# Patient Record
Sex: MALE | Race: BLACK OR AFRICAN AMERICAN | NOT HISPANIC OR LATINO | Employment: FULL TIME | URBAN - METROPOLITAN AREA
[De-identification: names, ages, dates, MRNs, and addresses within clinical notes are randomized per-mention and may not be internally consistent; named-entity substitution may affect disease eponyms.]

---

## 2024-02-22 ENCOUNTER — APPOINTMENT (OUTPATIENT)
Dept: GENERAL RADIOLOGY | Facility: HOSPITAL | Age: 29
End: 2024-02-22
Payer: COMMERCIAL

## 2024-02-22 ENCOUNTER — HOSPITAL ENCOUNTER (INPATIENT)
Facility: HOSPITAL | Age: 29
LOS: 5 days | Discharge: HOME HEALTH | End: 2024-02-28
Attending: EMERGENCY MEDICINE | Admitting: INTERNAL MEDICINE
Payer: COMMERCIAL

## 2024-02-22 ENCOUNTER — APPOINTMENT (OUTPATIENT)
Dept: CT IMAGING | Facility: HOSPITAL | Age: 29
End: 2024-02-22
Payer: COMMERCIAL

## 2024-02-22 DIAGNOSIS — D69.6 THROMBOCYTOPENIA: ICD-10-CM

## 2024-02-22 DIAGNOSIS — D53.9 MACROCYTIC ANEMIA: ICD-10-CM

## 2024-02-22 DIAGNOSIS — I60.9 SUBARACHNOID BLEED: ICD-10-CM

## 2024-02-22 DIAGNOSIS — E87.6 HYPOKALEMIA: ICD-10-CM

## 2024-02-22 DIAGNOSIS — R74.8 ELEVATED LIVER ENZYMES: ICD-10-CM

## 2024-02-22 DIAGNOSIS — S06.5XAA SUBDURAL HEMATOMA: Primary | ICD-10-CM

## 2024-02-22 DIAGNOSIS — E83.42 HYPOMAGNESEMIA: ICD-10-CM

## 2024-02-22 DIAGNOSIS — R56.9 SEIZURE-LIKE ACTIVITY: ICD-10-CM

## 2024-02-22 DIAGNOSIS — S01.81XA LACERATION OF FOREHEAD, INITIAL ENCOUNTER: ICD-10-CM

## 2024-02-22 DIAGNOSIS — R73.9 HYPERGLYCEMIA: ICD-10-CM

## 2024-02-22 LAB
ALBUMIN SERPL-MCNC: 4.7 G/DL (ref 3.5–5.2)
ALBUMIN/GLOB SERPL: 1.6 G/DL
ALP SERPL-CCNC: 244 U/L (ref 39–117)
ALT SERPL W P-5'-P-CCNC: 78 U/L (ref 1–41)
ANION GAP SERPL CALCULATED.3IONS-SCNC: 19.2 MMOL/L (ref 5–15)
ANION GAP SERPL CALCULATED.3IONS-SCNC: 32.1 MMOL/L (ref 5–15)
AST SERPL-CCNC: 224 U/L (ref 1–40)
BASOPHILS # BLD AUTO: 0.02 10*3/MM3 (ref 0–0.2)
BASOPHILS NFR BLD AUTO: 0.4 % (ref 0–1.5)
BILIRUB SERPL-MCNC: 0.7 MG/DL (ref 0–1.2)
BUN SERPL-MCNC: 6 MG/DL (ref 6–20)
BUN SERPL-MCNC: 6 MG/DL (ref 6–20)
BUN/CREAT SERPL: 6.8 (ref 7–25)
BUN/CREAT SERPL: 8.6 (ref 7–25)
CALCIUM SPEC-SCNC: 7.9 MG/DL (ref 8.6–10.5)
CALCIUM SPEC-SCNC: 8.5 MG/DL (ref 8.6–10.5)
CHLORIDE SERPL-SCNC: 90 MMOL/L (ref 98–107)
CHLORIDE SERPL-SCNC: 96 MMOL/L (ref 98–107)
CO2 SERPL-SCNC: 14.9 MMOL/L (ref 22–29)
CO2 SERPL-SCNC: 19.8 MMOL/L (ref 22–29)
CREAT SERPL-MCNC: 0.7 MG/DL (ref 0.76–1.27)
CREAT SERPL-MCNC: 0.88 MG/DL (ref 0.76–1.27)
DEPRECATED RDW RBC AUTO: 53.9 FL (ref 37–54)
EGFRCR SERPLBLD CKD-EPI 2021: 117.9 ML/MIN/1.73
EGFRCR SERPLBLD CKD-EPI 2021: 126.3 ML/MIN/1.73
EOSINOPHIL # BLD AUTO: 0.05 10*3/MM3 (ref 0–0.4)
EOSINOPHIL NFR BLD AUTO: 1 % (ref 0.3–6.2)
ERYTHROCYTE [DISTWIDTH] IN BLOOD BY AUTOMATED COUNT: 15.2 % (ref 12.3–15.4)
ETHANOL BLD-MCNC: <10 MG/DL (ref 0–10)
ETHANOL UR QL: <0.01 %
GLOBULIN UR ELPH-MCNC: 2.9 GM/DL
GLUCOSE SERPL-MCNC: 125 MG/DL (ref 65–99)
GLUCOSE SERPL-MCNC: 65 MG/DL (ref 65–99)
HCT VFR BLD AUTO: 36.6 % (ref 37.5–51)
HGB BLD-MCNC: 12.7 G/DL (ref 13–17.7)
LYMPHOCYTES # BLD AUTO: 1.16 10*3/MM3 (ref 0.7–3.1)
LYMPHOCYTES NFR BLD AUTO: 23.3 % (ref 19.6–45.3)
MAGNESIUM SERPL-MCNC: 1.2 MG/DL (ref 1.6–2.6)
MCH RBC QN AUTO: 33.9 PG (ref 26.6–33)
MCHC RBC AUTO-ENTMCNC: 34.7 G/DL (ref 31.5–35.7)
MCV RBC AUTO: 97.6 FL (ref 79–97)
MONOCYTES # BLD AUTO: 0.57 10*3/MM3 (ref 0.1–0.9)
MONOCYTES NFR BLD AUTO: 11.4 % (ref 5–12)
NEUTROPHILS NFR BLD AUTO: 3.15 10*3/MM3 (ref 1.7–7)
NEUTROPHILS NFR BLD AUTO: 63.3 % (ref 42.7–76)
PLATELET # BLD AUTO: 96 10*3/MM3 (ref 140–450)
PMV BLD AUTO: 10.6 FL (ref 6–12)
POTASSIUM SERPL-SCNC: 3.2 MMOL/L (ref 3.5–5.2)
POTASSIUM SERPL-SCNC: 3.2 MMOL/L (ref 3.5–5.2)
PROT SERPL-MCNC: 7.6 G/DL (ref 6–8.5)
RBC # BLD AUTO: 3.75 10*6/MM3 (ref 4.14–5.8)
SODIUM SERPL-SCNC: 135 MMOL/L (ref 136–145)
SODIUM SERPL-SCNC: 137 MMOL/L (ref 136–145)
WBC NRBC COR # BLD AUTO: 4.98 10*3/MM3 (ref 3.4–10.8)

## 2024-02-22 PROCEDURE — 81001 URINALYSIS AUTO W/SCOPE: CPT | Performed by: EMERGENCY MEDICINE

## 2024-02-22 PROCEDURE — 80053 COMPREHEN METABOLIC PANEL: CPT | Performed by: EMERGENCY MEDICINE

## 2024-02-22 PROCEDURE — 93010 ELECTROCARDIOGRAM REPORT: CPT | Performed by: INTERNAL MEDICINE

## 2024-02-22 PROCEDURE — 84100 ASSAY OF PHOSPHORUS: CPT

## 2024-02-22 PROCEDURE — 99285 EMERGENCY DEPT VISIT HI MDM: CPT

## 2024-02-22 PROCEDURE — 80307 DRUG TEST PRSMV CHEM ANLYZR: CPT | Performed by: EMERGENCY MEDICINE

## 2024-02-22 PROCEDURE — 83735 ASSAY OF MAGNESIUM: CPT | Performed by: EMERGENCY MEDICINE

## 2024-02-22 PROCEDURE — 25810000003 SODIUM CHLORIDE 0.9 % SOLUTION: Performed by: EMERGENCY MEDICINE

## 2024-02-22 PROCEDURE — 25010000002 MAGNESIUM SULFATE 2 GM/50ML SOLUTION: Performed by: EMERGENCY MEDICINE

## 2024-02-22 PROCEDURE — 85025 COMPLETE CBC W/AUTO DIFF WBC: CPT | Performed by: EMERGENCY MEDICINE

## 2024-02-22 PROCEDURE — 71045 X-RAY EXAM CHEST 1 VIEW: CPT

## 2024-02-22 PROCEDURE — 83735 ASSAY OF MAGNESIUM: CPT

## 2024-02-22 PROCEDURE — 82550 ASSAY OF CK (CPK): CPT | Performed by: INTERNAL MEDICINE

## 2024-02-22 PROCEDURE — 70450 CT HEAD/BRAIN W/O DYE: CPT

## 2024-02-22 PROCEDURE — 82077 ASSAY SPEC XCP UR&BREATH IA: CPT | Performed by: EMERGENCY MEDICINE

## 2024-02-22 PROCEDURE — 93005 ELECTROCARDIOGRAM TRACING: CPT | Performed by: EMERGENCY MEDICINE

## 2024-02-22 PROCEDURE — 0KQ10ZZ REPAIR FACIAL MUSCLE, OPEN APPROACH: ICD-10-PCS | Performed by: NURSE PRACTITIONER

## 2024-02-22 RX ORDER — POTASSIUM CHLORIDE 750 MG/1
40 TABLET, FILM COATED, EXTENDED RELEASE ORAL ONCE
Status: COMPLETED | OUTPATIENT
Start: 2024-02-22 | End: 2024-02-22

## 2024-02-22 RX ORDER — LIDOCAINE HYDROCHLORIDE AND EPINEPHRINE 10; 10 MG/ML; UG/ML
10 INJECTION, SOLUTION INFILTRATION; PERINEURAL ONCE
Status: COMPLETED | OUTPATIENT
Start: 2024-02-23 | End: 2024-02-23

## 2024-02-22 RX ORDER — MAGNESIUM SULFATE HEPTAHYDRATE 40 MG/ML
2 INJECTION, SOLUTION INTRAVENOUS ONCE
Status: COMPLETED | OUTPATIENT
Start: 2024-02-22 | End: 2024-02-22

## 2024-02-22 RX ADMIN — MAGNESIUM SULFATE HEPTAHYDRATE 2 G: 40 INJECTION, SOLUTION INTRAVENOUS at 21:20

## 2024-02-22 RX ADMIN — MAGNESIUM OXIDE 400 MG (241.3 MG MAGNESIUM) TABLET 800 MG: TABLET at 21:18

## 2024-02-22 RX ADMIN — SODIUM CHLORIDE 1000 ML: 9 INJECTION, SOLUTION INTRAVENOUS at 20:17

## 2024-02-22 RX ADMIN — POTASSIUM CHLORIDE 40 MEQ: 750 TABLET, EXTENDED RELEASE ORAL at 21:18

## 2024-02-22 NOTE — Clinical Note
[facility] EMERGENCY DEPARTMENT  [address] [facility] [address]  Phone: [phone]    Nida Peck was seen and treated in our emergency department on 2/22/2024.  He may return to work on 02/26/2024.         Thank you for choosing [facility].    Bernardo Zacarias MD

## 2024-02-22 NOTE — LETTER
February 28, 2024     Patient: Nida Peck   YOB: 1995   Date of Visit: 2/22/2024       To Whom It May Concern:    It is my medical opinion that Nida Peck may return to work on 3/04/2024  with the following restrictions     Seizure Precautions:  ·    Do not drive 90 days. (this is to include: car, bicycle, or motorcycle)  ·    Do not drive operate dangerous equipment such as power tools, heavy machinery with moving parts, or an  open flame.  ·    Do not swim alone (this would include a bathtub full of water is a small swimming pool).  ·    Avoid unsecured heights. (this is to include: scaffolding, ladders, trees, and roofs).       Sincerely,       AYO Alejandre.

## 2024-02-23 ENCOUNTER — APPOINTMENT (OUTPATIENT)
Dept: CT IMAGING | Facility: HOSPITAL | Age: 29
End: 2024-02-23
Payer: COMMERCIAL

## 2024-02-23 ENCOUNTER — APPOINTMENT (OUTPATIENT)
Dept: NEUROLOGY | Facility: HOSPITAL | Age: 29
End: 2024-02-23
Payer: COMMERCIAL

## 2024-02-23 ENCOUNTER — APPOINTMENT (OUTPATIENT)
Dept: CARDIOLOGY | Facility: HOSPITAL | Age: 29
End: 2024-02-23
Payer: COMMERCIAL

## 2024-02-23 ENCOUNTER — APPOINTMENT (OUTPATIENT)
Dept: MRI IMAGING | Facility: HOSPITAL | Age: 29
End: 2024-02-23
Payer: COMMERCIAL

## 2024-02-23 PROBLEM — S06.5XAA SUBDURAL HEMATOMA: Status: ACTIVE | Noted: 2024-02-23

## 2024-02-23 LAB
ALBUMIN SERPL-MCNC: 3.3 G/DL (ref 3.5–5.2)
ALBUMIN SERPL-MCNC: 4.1 G/DL (ref 3.5–5.2)
ALBUMIN/GLOB SERPL: 1.5 G/DL
ALP SERPL-CCNC: 244 U/L (ref 39–117)
ALT SERPL W P-5'-P-CCNC: 100 U/L (ref 1–41)
AMMONIA BLD-SCNC: 50 UMOL/L (ref 16–60)
AMPHET+METHAMPHET UR QL: NEGATIVE
ANION GAP SERPL CALCULATED.3IONS-SCNC: 12.5 MMOL/L (ref 5–15)
ANION GAP SERPL CALCULATED.3IONS-SCNC: 21.1 MMOL/L (ref 5–15)
APTT PPP: 28.9 SECONDS (ref 22.7–35.4)
AST SERPL-CCNC: 567 U/L (ref 1–40)
B-OH-BUTYR SERPL-SCNC: 2.67 MMOL/L (ref 0.02–0.27)
BACTERIA UR QL AUTO: ABNORMAL /HPF
BARBITURATES UR QL SCN: NEGATIVE
BENZODIAZ UR QL SCN: NEGATIVE
BH CV VAS HEPATIC PORTAL LIVER LENGTH: 18.4 CM
BH CV VAS HEPATIC PORTAL SPLEEN LENGTH: 7.5 CM
BH CV VAS HEPATIC PORTAL SPLEEN WIDTH: 2.5 CM
BH CV VAS HEPATIC PORTAL VEIN DIAMETER: 1.07 CM
BH CV VAS HEPATOPORTAL HEPATIC V LT DIRECTION: NORMAL
BH CV VAS HEPATOPORTAL HEPATIC V LT FLOW: NORMAL
BH CV VAS HEPATOPORTAL HEPATIC V LT SPONT: NORMAL
BH CV VAS HEPATOPORTAL HEPATIC V MID DIRECTION: NORMAL
BH CV VAS HEPATOPORTAL HEPATIC V MID FLOW: NORMAL
BH CV VAS HEPATOPORTAL HEPATIC V MID SPONT: NORMAL
BH CV VAS HEPATOPORTAL HEPATIC V RT DIRECTION: NORMAL
BH CV VAS HEPATOPORTAL HEPATIC V RT FLOW: NORMAL
BH CV VAS HEPATOPORTAL HEPATIC V RT SPONT: NORMAL
BH CV VAS HEPATOPORTAL IVC CONFLUENCE FLOW: NORMAL
BH CV VAS HEPATOPORTAL IVC CONFLUENCE SPONT: NORMAL
BH CV VAS HEPATOPORTAL IVC FLOW: NORMAL
BH CV VAS HEPATOPORTAL IVC SPONT: NORMAL
BH CV VAS HEPATOPORTAL PORTAL V EXTRAHEPATIC DIRECTION: NORMAL
BH CV VAS HEPATOPORTAL PORTAL V EXTRAHEPATIC FLOW: NORMAL
BH CV VAS HEPATOPORTAL PORTAL V EXTRAHEPATIC SPONT: NORMAL
BH CV VAS HEPATOPORTAL PORTAL V LT INTRA DIRECTION: NORMAL
BH CV VAS HEPATOPORTAL PORTAL V LT INTRA FLOW: NORMAL
BH CV VAS HEPATOPORTAL PORTAL V LT INTRA SPONT: NORMAL
BH CV VAS HEPATOPORTAL PORTAL V MAIN INTRA DIRECTION: NORMAL
BH CV VAS HEPATOPORTAL PORTAL V MAIN INTRA FLOW: NORMAL
BH CV VAS HEPATOPORTAL PORTAL V MAIN INTRA SPONT: NORMAL
BH CV VAS HEPATOPORTAL PORTAL V PSV: 27.2 CM/S
BH CV VAS HEPATOPORTAL PORTAL V RT INTRA DIRECTION: NORMAL
BH CV VAS HEPATOPORTAL PORTAL V RT INTRA FLOW: NORMAL
BH CV VAS HEPATOPORTAL PORTAL V RT INTRA SPONT: NORMAL
BH CV VAS HEPATOPORTAL SMV PSV: 20.5 CM/S
BH CV VAS HEPATOPORTAL SPLENIC DIRECTION: NORMAL
BH CV VAS HEPATOPORTAL SPLENIC FLOW: NORMAL
BH CV VAS HEPATOPORTAL SPLENIC SPONT: NORMAL
BH CV VAS HEPATOPORTAL SPLENIC V PSV: 35.4 CM/S
BH CV VAS SMA HEPATIC EDV: 31.2 CM/S
BH CV VAS SMA HEPATIC PSV: 72.1 CM/S
BH CV VAS SMA SPLENIC EDV: 37 CM/S
BH CV VAS SMA SPLENIC PSV: 80.9 CM/S
BILIRUB SERPL-MCNC: 1.2 MG/DL (ref 0–1.2)
BILIRUB UR QL STRIP: NEGATIVE
BUN SERPL-MCNC: 3 MG/DL (ref 6–20)
BUN SERPL-MCNC: 5 MG/DL (ref 6–20)
BUN/CREAT SERPL: 4.4 (ref 7–25)
BUN/CREAT SERPL: 6.8 (ref 7–25)
CALCIUM SPEC-SCNC: 7.4 MG/DL (ref 8.6–10.5)
CALCIUM SPEC-SCNC: 8.3 MG/DL (ref 8.6–10.5)
CANNABINOIDS SERPL QL: NEGATIVE
CHLORIDE SERPL-SCNC: 103 MMOL/L (ref 98–107)
CHLORIDE SERPL-SCNC: 96 MMOL/L (ref 98–107)
CHOLEST SERPL-MCNC: 161 MG/DL (ref 0–200)
CK SERPL-CCNC: 163 U/L (ref 20–200)
CK SERPL-CCNC: 236 U/L (ref 20–200)
CLARITY UR: ABNORMAL
CO2 SERPL-SCNC: 20.5 MMOL/L (ref 22–29)
CO2 SERPL-SCNC: 20.9 MMOL/L (ref 22–29)
COCAINE UR QL: NEGATIVE
COLOR UR: ABNORMAL
CREAT SERPL-MCNC: 0.68 MG/DL (ref 0.76–1.27)
CREAT SERPL-MCNC: 0.74 MG/DL (ref 0.76–1.27)
D-LACTATE SERPL-SCNC: 1.5 MMOL/L (ref 0.5–2)
DEPRECATED RDW RBC AUTO: 56.1 FL (ref 37–54)
EGFRCR SERPLBLD CKD-EPI 2021: 124.2 ML/MIN/1.73
EGFRCR SERPLBLD CKD-EPI 2021: 127.4 ML/MIN/1.73
ERYTHROCYTE [DISTWIDTH] IN BLOOD BY AUTOMATED COUNT: 15.8 % (ref 12.3–15.4)
FENTANYL UR-MCNC: NEGATIVE NG/ML
FOLATE SERPL-MCNC: 3.18 NG/ML (ref 4.78–24.2)
GGT SERPL-CCNC: 573 U/L (ref 8–61)
GLOBULIN UR ELPH-MCNC: 2.7 GM/DL
GLUCOSE BLDC GLUCOMTR-MCNC: 71 MG/DL (ref 70–130)
GLUCOSE BLDC GLUCOMTR-MCNC: 85 MG/DL (ref 70–130)
GLUCOSE BLDC GLUCOMTR-MCNC: 87 MG/DL (ref 70–130)
GLUCOSE BLDC GLUCOMTR-MCNC: 89 MG/DL (ref 70–130)
GLUCOSE SERPL-MCNC: 79 MG/DL (ref 65–99)
GLUCOSE SERPL-MCNC: 89 MG/DL (ref 65–99)
GLUCOSE UR STRIP-MCNC: NEGATIVE MG/DL
HAV IGM SERPL QL IA: NORMAL
HBA1C MFR BLD: 5.5 % (ref 4.8–5.6)
HBV CORE IGM SERPL QL IA: NORMAL
HBV SURFACE AG SERPL QL IA: NORMAL
HCT VFR BLD AUTO: 33.4 % (ref 37.5–51)
HCV AB SER DONR QL: NORMAL
HDLC SERPL-MCNC: 78 MG/DL (ref 40–60)
HGB BLD-MCNC: 11.5 G/DL (ref 13–17.7)
HGB UR QL STRIP.AUTO: NEGATIVE
HYALINE CASTS UR QL AUTO: ABNORMAL /LPF
INR PPP: 1.19 (ref 0.9–1.1)
KETONES UR QL STRIP: ABNORMAL
LDH SERPL-CCNC: 465 U/L (ref 135–225)
LDLC SERPL CALC-MCNC: 67 MG/DL (ref 0–100)
LDLC/HDLC SERPL: 0.84 {RATIO}
LEUKOCYTE ESTERASE UR QL STRIP.AUTO: ABNORMAL
MAGNESIUM SERPL-MCNC: 1.8 MG/DL (ref 1.6–2.6)
MCH RBC QN AUTO: 33.5 PG (ref 26.6–33)
MCHC RBC AUTO-ENTMCNC: 34.4 G/DL (ref 31.5–35.7)
MCV RBC AUTO: 97.4 FL (ref 79–97)
METHADONE UR QL SCN: NEGATIVE
NITRITE UR QL STRIP: NEGATIVE
OPIATES UR QL: NEGATIVE
OSMOLALITY SERPL: 277 MOSM/KG (ref 275–300)
OXYCODONE UR QL SCN: NEGATIVE
PH UR STRIP.AUTO: 6.5 [PH] (ref 5–8)
PHOSPHATE SERPL-MCNC: 1.5 MG/DL (ref 2.5–4.5)
PHOSPHATE SERPL-MCNC: 1.5 MG/DL (ref 2.5–4.5)
PHOSPHATE SERPL-MCNC: 2.1 MG/DL (ref 2.5–4.5)
PHOSPHATE SERPL-MCNC: 2.2 MG/DL (ref 2.5–4.5)
PLATELET # BLD AUTO: 81 10*3/MM3 (ref 140–450)
PMV BLD AUTO: 10.5 FL (ref 6–12)
POTASSIUM SERPL-SCNC: 3.2 MMOL/L (ref 3.5–5.2)
POTASSIUM SERPL-SCNC: 3.7 MMOL/L (ref 3.5–5.2)
PROCALCITONIN SERPL-MCNC: 0.6 NG/ML (ref 0–0.25)
PROT SERPL-MCNC: 6.8 G/DL (ref 6–8.5)
PROT UR QL STRIP: ABNORMAL
PROTHROMBIN TIME: 15.3 SECONDS (ref 11.7–14.2)
QT INTERVAL: 351 MS
QT INTERVAL: 379 MS
QTC INTERVAL: 451 MS
QTC INTERVAL: 597 MS
RBC # BLD AUTO: 3.43 10*6/MM3 (ref 4.14–5.8)
RBC # UR STRIP: ABNORMAL /HPF
REF LAB TEST METHOD: ABNORMAL
SODIUM SERPL-SCNC: 136 MMOL/L (ref 136–145)
SODIUM SERPL-SCNC: 138 MMOL/L (ref 136–145)
SP GR UR STRIP: 1.02 (ref 1–1.03)
SQUAMOUS #/AREA URNS HPF: ABNORMAL /HPF
TRICHOMONAS #/AREA URNS HPF: ABNORMAL /HPF
TRIGL SERPL-MCNC: 88 MG/DL (ref 0–150)
UROBILINOGEN UR QL STRIP: ABNORMAL
VIT B12 BLD-MCNC: 726 PG/ML (ref 211–946)
VLDLC SERPL-MCNC: 16 MG/DL (ref 5–40)
WBC # UR STRIP: ABNORMAL /HPF
WBC NRBC COR # BLD AUTO: 5.13 10*3/MM3 (ref 3.4–10.8)

## 2024-02-23 PROCEDURE — 99222 1ST HOSP IP/OBS MODERATE 55: CPT | Performed by: STUDENT IN AN ORGANIZED HEALTH CARE EDUCATION/TRAINING PROGRAM

## 2024-02-23 PROCEDURE — 93010 ELECTROCARDIOGRAM REPORT: CPT | Performed by: INTERNAL MEDICINE

## 2024-02-23 PROCEDURE — 82010 KETONE BODYS QUAN: CPT

## 2024-02-23 PROCEDURE — 80053 COMPREHEN METABOLIC PANEL: CPT

## 2024-02-23 PROCEDURE — 95816 EEG AWAKE AND DROWSY: CPT

## 2024-02-23 PROCEDURE — 25010000002 TETANUS-DIPHTH-ACELL PERTUSSIS 5-2.5-18.5 LF-MCG/0.5 SUSPENSION PREFILLED SYRINGE: Performed by: EMERGENCY MEDICINE

## 2024-02-23 PROCEDURE — 25810000003 SODIUM CHLORIDE 0.9 % SOLUTION: Performed by: INTERNAL MEDICINE

## 2024-02-23 PROCEDURE — 82140 ASSAY OF AMMONIA: CPT | Performed by: INTERNAL MEDICINE

## 2024-02-23 PROCEDURE — 85610 PROTHROMBIN TIME: CPT | Performed by: EMERGENCY MEDICINE

## 2024-02-23 PROCEDURE — 25010000002 LEVETRIRACETAM PER 10 MG: Performed by: EMERGENCY MEDICINE

## 2024-02-23 PROCEDURE — 82948 REAGENT STRIP/BLOOD GLUCOSE: CPT

## 2024-02-23 PROCEDURE — 25010000002 ONDANSETRON PER 1 MG: Performed by: EMERGENCY MEDICINE

## 2024-02-23 PROCEDURE — 85027 COMPLETE CBC AUTOMATED: CPT

## 2024-02-23 PROCEDURE — 25810000003 DEXTROSE-NACL PER 500 ML: Performed by: INTERNAL MEDICINE

## 2024-02-23 PROCEDURE — 95711 VEEG 2-12 HR UNMONITORED: CPT

## 2024-02-23 PROCEDURE — 83930 ASSAY OF BLOOD OSMOLALITY: CPT

## 2024-02-23 PROCEDURE — 84100 ASSAY OF PHOSPHORUS: CPT

## 2024-02-23 PROCEDURE — 84100 ASSAY OF PHOSPHORUS: CPT | Performed by: INTERNAL MEDICINE

## 2024-02-23 PROCEDURE — 80061 LIPID PANEL: CPT

## 2024-02-23 PROCEDURE — 97166 OT EVAL MOD COMPLEX 45 MIN: CPT

## 2024-02-23 PROCEDURE — 97530 THERAPEUTIC ACTIVITIES: CPT

## 2024-02-23 PROCEDURE — 85730 THROMBOPLASTIN TIME PARTIAL: CPT | Performed by: EMERGENCY MEDICINE

## 2024-02-23 PROCEDURE — 93005 ELECTROCARDIOGRAM TRACING: CPT

## 2024-02-23 PROCEDURE — 82746 ASSAY OF FOLIC ACID SERUM: CPT

## 2024-02-23 PROCEDURE — 99253 IP/OBS CNSLTJ NEW/EST LOW 45: CPT | Performed by: NURSE PRACTITIONER

## 2024-02-23 PROCEDURE — 82607 VITAMIN B-12: CPT

## 2024-02-23 PROCEDURE — 25810000003 SODIUM CHLORIDE 0.9 % SOLUTION

## 2024-02-23 PROCEDURE — 92610 EVALUATE SWALLOWING FUNCTION: CPT

## 2024-02-23 PROCEDURE — 99222 1ST HOSP IP/OBS MODERATE 55: CPT | Performed by: INTERNAL MEDICINE

## 2024-02-23 PROCEDURE — 70450 CT HEAD/BRAIN W/O DYE: CPT

## 2024-02-23 PROCEDURE — 25010000002 THIAMINE HCL 200 MG/2ML SOLUTION: Performed by: INTERNAL MEDICINE

## 2024-02-23 PROCEDURE — 72125 CT NECK SPINE W/O DYE: CPT

## 2024-02-23 PROCEDURE — 83615 LACTATE (LD) (LDH) ENZYME: CPT | Performed by: INTERNAL MEDICINE

## 2024-02-23 PROCEDURE — 93975 VASCULAR STUDY: CPT

## 2024-02-23 PROCEDURE — 82977 ASSAY OF GGT: CPT | Performed by: INTERNAL MEDICINE

## 2024-02-23 PROCEDURE — 83036 HEMOGLOBIN GLYCOSYLATED A1C: CPT

## 2024-02-23 PROCEDURE — 25010000002 LEVETRIRACETAM PER 10 MG: Performed by: STUDENT IN AN ORGANIZED HEALTH CARE EDUCATION/TRAINING PROGRAM

## 2024-02-23 PROCEDURE — 90471 IMMUNIZATION ADMIN: CPT | Performed by: EMERGENCY MEDICINE

## 2024-02-23 PROCEDURE — 84145 PROCALCITONIN (PCT): CPT | Performed by: INTERNAL MEDICINE

## 2024-02-23 PROCEDURE — 74176 CT ABD & PELVIS W/O CONTRAST: CPT

## 2024-02-23 PROCEDURE — 82085 ASSAY OF ALDOLASE: CPT | Performed by: INTERNAL MEDICINE

## 2024-02-23 PROCEDURE — 95711 VEEG 2-12 HR UNMONITORED: CPT | Performed by: STUDENT IN AN ORGANIZED HEALTH CARE EDUCATION/TRAINING PROGRAM

## 2024-02-23 PROCEDURE — 25010000002 LORAZEPAM PER 2 MG: Performed by: INTERNAL MEDICINE

## 2024-02-23 PROCEDURE — 90715 TDAP VACCINE 7 YRS/> IM: CPT | Performed by: EMERGENCY MEDICINE

## 2024-02-23 PROCEDURE — 80074 ACUTE HEPATITIS PANEL: CPT | Performed by: INTERNAL MEDICINE

## 2024-02-23 PROCEDURE — 97163 PT EVAL HIGH COMPLEX 45 MIN: CPT

## 2024-02-23 PROCEDURE — 83605 ASSAY OF LACTIC ACID: CPT | Performed by: INTERNAL MEDICINE

## 2024-02-23 PROCEDURE — 82550 ASSAY OF CK (CPK): CPT | Performed by: INTERNAL MEDICINE

## 2024-02-23 RX ORDER — CLONIDINE HYDROCHLORIDE 0.1 MG/1
0.1 TABLET ORAL EVERY 4 HOURS PRN
Status: DISCONTINUED | OUTPATIENT
Start: 2024-02-23 | End: 2024-02-28 | Stop reason: HOSPADM

## 2024-02-23 RX ORDER — LORAZEPAM 2 MG/ML
4 INJECTION INTRAMUSCULAR
Status: ACTIVE | OUTPATIENT
Start: 2024-02-23 | End: 2024-02-28

## 2024-02-23 RX ORDER — THIAMINE HYDROCHLORIDE 100 MG/ML
200 INJECTION, SOLUTION INTRAMUSCULAR; INTRAVENOUS EVERY 8 HOURS SCHEDULED
Status: DISPENSED | OUTPATIENT
Start: 2024-02-23 | End: 2024-02-28

## 2024-02-23 RX ORDER — LEVETIRACETAM 500 MG/5ML
500 INJECTION, SOLUTION, CONCENTRATE INTRAVENOUS EVERY 12 HOURS SCHEDULED
Status: DISCONTINUED | OUTPATIENT
Start: 2024-02-23 | End: 2024-02-27

## 2024-02-23 RX ORDER — DEXTROSE AND SODIUM CHLORIDE 5; .9 G/100ML; G/100ML
100 INJECTION, SOLUTION INTRAVENOUS CONTINUOUS
Status: DISCONTINUED | OUTPATIENT
Start: 2024-02-23 | End: 2024-02-27

## 2024-02-23 RX ORDER — ACETAMINOPHEN 325 MG/1
650 TABLET ORAL EVERY 4 HOURS PRN
Status: DISCONTINUED | OUTPATIENT
Start: 2024-02-23 | End: 2024-02-28 | Stop reason: HOSPADM

## 2024-02-23 RX ORDER — SODIUM CHLORIDE 0.9 % (FLUSH) 0.9 %
10 SYRINGE (ML) INJECTION AS NEEDED
Status: DISCONTINUED | OUTPATIENT
Start: 2024-02-23 | End: 2024-02-28 | Stop reason: HOSPADM

## 2024-02-23 RX ORDER — PANTOPRAZOLE SODIUM 40 MG/10ML
40 INJECTION, POWDER, LYOPHILIZED, FOR SOLUTION INTRAVENOUS
Status: DISCONTINUED | OUTPATIENT
Start: 2024-02-23 | End: 2024-02-27

## 2024-02-23 RX ORDER — FENTANYL/ROPIVACAINE/NS/PF 2-625MCG/1
15 PLASTIC BAG, INJECTION (ML) EPIDURAL ONCE
Status: COMPLETED | OUTPATIENT
Start: 2024-02-23 | End: 2024-02-24

## 2024-02-23 RX ORDER — LORAZEPAM 2 MG/ML
2 INJECTION INTRAMUSCULAR EVERY 6 HOURS
Status: COMPLETED | OUTPATIENT
Start: 2024-02-23 | End: 2024-02-23

## 2024-02-23 RX ORDER — LORAZEPAM 2 MG/ML
1 INJECTION INTRAMUSCULAR EVERY 6 HOURS
Status: DISPENSED | OUTPATIENT
Start: 2024-02-24 | End: 2024-02-26

## 2024-02-23 RX ORDER — SODIUM CHLORIDE 9 MG/ML
150 INJECTION, SOLUTION INTRAVENOUS CONTINUOUS
Status: DISCONTINUED | OUTPATIENT
Start: 2024-02-23 | End: 2024-02-23

## 2024-02-23 RX ORDER — DOCUSATE SODIUM 100 MG/1
100 CAPSULE, LIQUID FILLED ORAL DAILY
Status: DISCONTINUED | OUTPATIENT
Start: 2024-02-23 | End: 2024-02-28 | Stop reason: HOSPADM

## 2024-02-23 RX ORDER — SODIUM CHLORIDE 9 MG/ML
40 INJECTION, SOLUTION INTRAVENOUS AS NEEDED
Status: DISCONTINUED | OUTPATIENT
Start: 2024-02-23 | End: 2024-02-28 | Stop reason: HOSPADM

## 2024-02-23 RX ORDER — LORAZEPAM 2 MG/ML
2 INJECTION INTRAMUSCULAR
Status: DISPENSED | OUTPATIENT
Start: 2024-02-23 | End: 2024-02-28

## 2024-02-23 RX ORDER — POTASSIUM CHLORIDE 1.5 G/1.58G
40 POWDER, FOR SOLUTION ORAL EVERY 4 HOURS
Status: COMPLETED | OUTPATIENT
Start: 2024-02-23 | End: 2024-02-23

## 2024-02-23 RX ORDER — SODIUM CHLORIDE 0.9 % (FLUSH) 0.9 %
10 SYRINGE (ML) INJECTION EVERY 12 HOURS SCHEDULED
Status: DISCONTINUED | OUTPATIENT
Start: 2024-02-23 | End: 2024-02-28 | Stop reason: HOSPADM

## 2024-02-23 RX ORDER — BISACODYL 10 MG
10 SUPPOSITORY, RECTAL RECTAL DAILY PRN
Status: DISCONTINUED | OUTPATIENT
Start: 2024-02-23 | End: 2024-02-28 | Stop reason: HOSPADM

## 2024-02-23 RX ORDER — LORAZEPAM 1 MG/1
1 TABLET ORAL
Status: ACTIVE | OUTPATIENT
Start: 2024-02-23 | End: 2024-02-28

## 2024-02-23 RX ORDER — LORAZEPAM 2 MG/ML
1 INJECTION INTRAMUSCULAR
Status: ACTIVE | OUTPATIENT
Start: 2024-02-23 | End: 2024-02-28

## 2024-02-23 RX ORDER — LORAZEPAM 2 MG/ML
2 INJECTION INTRAMUSCULAR
Status: ACTIVE | OUTPATIENT
Start: 2024-02-23 | End: 2024-02-28

## 2024-02-23 RX ORDER — LORAZEPAM 1 MG/1
4 TABLET ORAL
Status: ACTIVE | OUTPATIENT
Start: 2024-02-23 | End: 2024-02-28

## 2024-02-23 RX ORDER — ALUMINA, MAGNESIA, AND SIMETHICONE 2400; 2400; 240 MG/30ML; MG/30ML; MG/30ML
7.5 SUSPENSION ORAL EVERY 4 HOURS PRN
Status: DISCONTINUED | OUTPATIENT
Start: 2024-02-23 | End: 2024-02-28 | Stop reason: HOSPADM

## 2024-02-23 RX ORDER — ONDANSETRON 2 MG/ML
4 INJECTION INTRAMUSCULAR; INTRAVENOUS EVERY 6 HOURS PRN
Status: DISCONTINUED | OUTPATIENT
Start: 2024-02-23 | End: 2024-02-28 | Stop reason: HOSPADM

## 2024-02-23 RX ORDER — LORAZEPAM 1 MG/1
2 TABLET ORAL
Status: ACTIVE | OUTPATIENT
Start: 2024-02-23 | End: 2024-02-28

## 2024-02-23 RX ORDER — FENTANYL/ROPIVACAINE/NS/PF 2-625MCG/1
15 PLASTIC BAG, INJECTION (ML) EPIDURAL
Status: COMPLETED | OUTPATIENT
Start: 2024-02-23 | End: 2024-02-23

## 2024-02-23 RX ORDER — ACETAMINOPHEN 650 MG/1
650 SUPPOSITORY RECTAL EVERY 4 HOURS PRN
Status: DISCONTINUED | OUTPATIENT
Start: 2024-02-23 | End: 2024-02-28 | Stop reason: HOSPADM

## 2024-02-23 RX ORDER — ONDANSETRON 2 MG/ML
4 INJECTION INTRAMUSCULAR; INTRAVENOUS ONCE
Status: COMPLETED | OUTPATIENT
Start: 2024-02-23 | End: 2024-02-23

## 2024-02-23 RX ORDER — LEVETIRACETAM 500 MG/5ML
1000 INJECTION, SOLUTION, CONCENTRATE INTRAVENOUS ONCE
Status: COMPLETED | OUTPATIENT
Start: 2024-02-23 | End: 2024-02-23

## 2024-02-23 RX ADMIN — LEVETIRACETAM 1000 MG: 100 INJECTION INTRAVENOUS at 01:10

## 2024-02-23 RX ADMIN — FOLIC ACID 1 MG: 5 INJECTION, SOLUTION INTRAMUSCULAR; INTRAVENOUS; SUBCUTANEOUS at 09:12

## 2024-02-23 RX ADMIN — POTASSIUM CHLORIDE 40 MEQ: 1.5 FOR SOLUTION ORAL at 07:48

## 2024-02-23 RX ADMIN — LIDOCAINE HYDROCHLORIDE,EPINEPHRINE BITARTRATE 10 ML: 10; .01 INJECTION, SOLUTION INFILTRATION; PERINEURAL at 01:24

## 2024-02-23 RX ADMIN — Medication 10 ML: at 07:48

## 2024-02-23 RX ADMIN — Medication 10 ML: at 03:11

## 2024-02-23 RX ADMIN — DOCUSATE SODIUM 100 MG: 100 CAPSULE, LIQUID FILLED ORAL at 07:48

## 2024-02-23 RX ADMIN — SODIUM CHLORIDE 100 ML/HR: 9 INJECTION, SOLUTION INTRAVENOUS at 03:33

## 2024-02-23 RX ADMIN — LEVETIRACETAM 500 MG: 500 INJECTION, SOLUTION INTRAVENOUS at 20:45

## 2024-02-23 RX ADMIN — SODIUM CHLORIDE 150 ML/HR: 9 INJECTION, SOLUTION INTRAVENOUS at 14:07

## 2024-02-23 RX ADMIN — POTASSIUM PHOSPHATE, MONOBASIC POTASSIUM PHOSPHATE, DIBASIC 15 MMOL: 224; 236 INJECTION, SOLUTION, CONCENTRATE INTRAVENOUS at 23:29

## 2024-02-23 RX ADMIN — PANTOPRAZOLE SODIUM 40 MG: 40 INJECTION, POWDER, FOR SOLUTION INTRAVENOUS at 05:46

## 2024-02-23 RX ADMIN — THIAMINE HYDROCHLORIDE 200 MG: 100 INJECTION, SOLUTION INTRAMUSCULAR; INTRAVENOUS at 14:03

## 2024-02-23 RX ADMIN — ONDANSETRON 4 MG: 2 INJECTION INTRAMUSCULAR; INTRAVENOUS at 00:17

## 2024-02-23 RX ADMIN — DEXTROSE AND SODIUM CHLORIDE 100 ML/HR: 5; 900 INJECTION, SOLUTION INTRAVENOUS at 17:21

## 2024-02-23 RX ADMIN — LEVETIRACETAM 500 MG: 500 INJECTION, SOLUTION INTRAVENOUS at 10:56

## 2024-02-23 RX ADMIN — THIAMINE HYDROCHLORIDE 200 MG: 100 INJECTION, SOLUTION INTRAMUSCULAR; INTRAVENOUS at 05:12

## 2024-02-23 RX ADMIN — LORAZEPAM 2 MG: 2 INJECTION INTRAMUSCULAR; INTRAVENOUS at 03:11

## 2024-02-23 RX ADMIN — LORAZEPAM 2 MG: 2 INJECTION INTRAMUSCULAR; INTRAVENOUS at 14:03

## 2024-02-23 RX ADMIN — LORAZEPAM 2 MG: 2 INJECTION INTRAMUSCULAR; INTRAVENOUS at 07:48

## 2024-02-23 RX ADMIN — POTASSIUM CHLORIDE 40 MEQ: 1.5 FOR SOLUTION ORAL at 04:26

## 2024-02-23 RX ADMIN — TETANUS TOXOID, REDUCED DIPHTHERIA TOXOID AND ACELLULAR PERTUSSIS VACCINE, ADSORBED 0.5 ML: 5; 2.5; 8; 8; 2.5 SUSPENSION INTRAMUSCULAR at 00:27

## 2024-02-23 RX ADMIN — Medication 10 ML: at 20:45

## 2024-02-23 RX ADMIN — POTASSIUM PHOSPHATE, MONOBASIC POTASSIUM PHOSPHATE, DIBASIC 15 MMOL: 224; 236 INJECTION, SOLUTION, CONCENTRATE INTRAVENOUS at 09:13

## 2024-02-23 RX ADMIN — METOPROLOL TARTRATE 12.5 MG: 25 TABLET, FILM COATED ORAL at 20:46

## 2024-02-23 RX ADMIN — SODIUM CHLORIDE 1000 ML: 9 INJECTION, SOLUTION INTRAVENOUS at 04:31

## 2024-02-23 RX ADMIN — LORAZEPAM 2 MG: 2 INJECTION INTRAMUSCULAR; INTRAVENOUS at 20:45

## 2024-02-23 RX ADMIN — POTASSIUM PHOSPHATE, MONOBASIC POTASSIUM PHOSPHATE, DIBASIC 15 MMOL: 224; 236 INJECTION, SOLUTION, CONCENTRATE INTRAVENOUS at 05:10

## 2024-02-23 NOTE — ED TRIAGE NOTES
Pt arrived via [facility] from work. 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 Per witness, pt was unconscious for about 3-5 minutes. Pt denies any similar history. Pt A&O x4 during triage exam but states he feels lightheaded.

## 2024-02-23 NOTE — H&P
Group: [facility]         History and Physical    Patient Identification:  Nida Peck  29 y.o.  male  1995  [id number]            Requesting physician: Dr Mckeon    Reason for Consultation: ICU admission    CC: Seizure, SDH, SAH, Alcohol withdrawal    History of Present Illness:  Nida Peck is a 29 year old male with past medical history of alcohol dependence with withdrawal, alcoholic ketosis, malnutrition, iron deficiency anemia, alcoholic hepatitis, thrombocytopenia, tobacco abuse, Sandoval-Alvaro syndrome with Bactrim, seizures, anxiety and depression, GERD, esophagitis who presented to the ED for possible seizure at work.  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  Reports feeling of his heart racing, and has been having nausea vomiting diarrhea x 2 days with no abdominal pain.  He received IV fluids, CT head was negative, and was being discharged home with a referral to neurology with seizure precautions when he collapsed in the ED hallway and was found with seizure activity.  He hit is head and a laceration was found above right eye.  CT head revealed left subdural hematoma, midline shift of 6mm as well as trace left subarachnoid, parafalcine and tentorial hemorrhage.  He was given 1 g IV Keppra, 2 g mag sulfate, potassium chloride, tetanus injection and his eyebrow was sutured in the ED.  Neurosurgery consulted and he was admitted to ICU for further management.      Per chart review, in 2021 patient was drinking half pint of liquor every 2 day, and then 2022 was drinking 1 pint daily and attempted rehab stay.  He has history of prior seizure from alcohol withdrawal and known liver dysfunction with thrombocytopenia.  Has been having n/v/diarrhea but no abdominal pain and denies blood in stool.  Per chart review patient states he becomes tremorous and  "tachycardic when he is not drinking.  Upon my exam pt somewhat uncooperative with interview but stated he has not had a drink for maybe a couple days.  He states has \"3\" drinks of liquor every day, whatever he can find at home, assuming possibly 3 pints.  He smokes approximately 3 cigarettes/day.  He is not currently taking any medications.  He lives in a hotel.    Review of Systems  Unable, pt uncooperative upon exam    Past Medical History:  History reviewed. No pertinent past medical history.    Past Surgical History:  History reviewed. No pertinent surgical history.     Home Meds:  No medications prior to admission.       Allergies:  Allergies   Allergen Reactions    Penicillins Unknown - Low Severity    Sulfa Antibiotics Unknown - Low Severity       Social History:   Social History     Socioeconomic History    Marital status: Single       Family History:  History reviewed. No pertinent family history.    Physical Exam:  /95   Pulse (!) 141   Temp 99.2 °F (37.3 °C)   Resp 18   Ht 185.4 cm (73\")   Wt 65.7 kg (144 lb 13.5 oz)   SpO2 96%   BMI 19.11 kg/m²  Body mass index is 19.11 kg/m². 96% 65.7 kg (144 lb 13.5 oz)    Physical Exam  Constitutional: Young male patient lying in bed, No acute respiratory distress, no accessory muscle use  Head: NCAT  Eyes: No pallor, icteric conjunctiva, EOMI. Pupils 4 bilaterally, brisk round and equal.  R eyebrow with sutures.  Pt unable to open right eye at this time r/t swelling.  ENMT:  No oral thrush. Moist MM.   NECK: Trachea midline, no thyromegaly.   Heart: Regular rhythm, tachycardic. no murmur. No pedal edema .  Lungs: ODELL +, clear to auscultation throughout, No wheezes/ crackles heard    Abdomen: Soft. No tenderness, guarding or rigidity. No palpable masses.  Extremities: Extremities warm and well perfused. No cyanosis/ clubbing. All pulses palpable.  Neuro: Responds to voice, voice is soft. Follows commands, moves all extremities equally, no gross focal " neuro deficits. Pt just received ativan per [facility] protocol  Psych: Seems sad, uncooperative    PPE recommended per [facility] infectious disease Isolation protocol for the current clinical scenario(as mentioned below) was followed.    LABS:  SARS-CoV-2, REYMUNDO   Date Value Ref Range Status   03/29/2023 NEGATIVE Negative Final     Comment:     The 2019-CoV rRT-PCR Assay is only for use under a Food and Drug Administration Emergency Use Authorization. The performance characteristics of the assay were verified by the Clinical Microbiology Laboratory at the [facility].   Results should be used in conjunction with the patient's clinical symptoms, medical history, and other clinical/laboratory findings to determine an overall clinical diagnosis. Negative results do not preclude infection with SARS-CoV-2 (COVID-19).    Test parameters have not been validated for screening asymptomatic patients.       Lab Results   Component Value Date    CALCIUM 7.9 (L) 02/22/2024    PHOS 1.5 (C) 02/22/2024     Results from last 7 days   Lab Units 02/23/24  0300 02/22/24 2217 02/22/24 2013   MAGNESIUM mg/dL  --  1.8 1.2*   SODIUM mmol/L  --  135* 137   POTASSIUM mmol/L  --  3.2* 3.2*   CHLORIDE mmol/L  --  96* 90*   CO2 mmol/L  --  19.8* 14.9*   BUN mg/dL  --  6 6   CREATININE mg/dL  --  0.70* 0.88   GLUCOSE mg/dL  --  65 125*   CALCIUM mg/dL  --  7.9* 8.5*   WBC 10*3/mm3 5.13  --  4.98   HEMOGLOBIN g/dL 11.5*  --  12.7*   PLATELETS 10*3/mm3 81*  --  96*   ALT (SGPT) U/L  --   --  78*   AST (SGOT) U/L  --   --  224*     Lab Results   Component Value Date    CKTOTAL 163 02/22/2024    TROPONINI <0.010 02/18/2022     Results from last 7 days   Lab Units 02/22/24 2217   CK TOTAL U/L 163                             Lab Results   Component Value Date    TSH 0.745 10/22/2021     Estimated Creatinine Clearance: 142.1 mL/min (A) (by C-G formula based on SCr of 0.7 mg/dL (L)).  Results from last 7 days   Lab Units  02/22/24  2345   NITRITE UA  Negative   WBC UA /HPF Too Numerous to Count*   BACTERIA UA /HPF None Seen   SQUAM EPITHEL UA /HPF 7-12*        Imaging: I personally visualized the images of scans/x-rays performed within last 3 days.      Assessment:  Alcohol dependence and withdrawal  Seizure activity  Subdural hematoma  Subarachnoid hemorrhage  High anion gap metabolic acidosis  Alcoholic ketoacidosis  Thrombocytopenia  Elevated liver enzymes  Nausea and vomiting  Diarrhea  Macrocytic anemia  Electrolyte disturbance  Hyperglycemia  Atrial fib RVR    Recommendations:   Alcohol dependence and withdrawal  -Start CIWA Protocol with scheduled Ativan  -IV folic acid, thiamine.  Will check levels in a.m.    2.  Seizure activity  -Secondary to #1  -Received 1 g Keppra IV at 0110, defer to Neurosurgery/Neurology further management  -Neurology consult  -Seizure precautions    3.  Subdural hematoma/Subarachnoid hemorrhage  -Neurosurgery managing  -Neurochecks every hour  -NIH every shift  -Repeat CT head in AM    4.  High anion gap metabolic acidosis  -Anion gap improved from earlier after IV fluid administration, serum bicarbonate mildly decreased  -Likely due to alcoholic ketoacidosis, diarrhea  -Will give another liter bolus as pt is also increasingly tachycardic, continuous IVF  -Check serum osmol, BHOB  -Urine with 3+ ketones    5.  Thrombocytopenia/Macrocytic anemia  -Alcohol induced, platelets 222 in January 2023, now 81.  Continue to monitor closely, recheck AM labs  -Hgb stable, will check occult blood stool    6.  Elevated liver enzymes/Nausea, vomiting, diarrhea  - Liver enzymes have trended up on repeat labs  -Will get CT abdomen pelvis   -Ammonia normal  -Protonix for stress ulcer prophy  -GI consult    7.  Electrolyte disturbance  -Hypokalemia, hypophosphatemia,   -Renal function normal, will start electrolyte replacement protocol    8.  Atrial fib RVR  -Pt had episode of afib RVR, paroxysmal. If continues in afib  will consult cardiology    SCDs    Patient was placed in face mask upon entering room and kept mask on throughout our encounter. I wore full protective equipment throughout this patient encounter including a face mask, gown and gloves. Hand hygiene was performed before donning protective equipment and after removal when leaving the room.    Rocio Pitts, APRN  2/23/2024  03:25 EST      Much of this encounter note is an electronic transcription/translation of spoken language to printed text using Dragon Software.

## 2024-02-23 NOTE — CONSULTS
"Nutrition Services    Patient Name:  Nida Peck  YOB: 1995  MRN: [id number]  Admit Date:  2/22/2024    Assessment Date:  02/23/24    Summary: Nutrition assessment initiated due to nursing admission screen. Pt was admitted with Seizure, alcohol withdrawal, SDH, SAH, metabolic acidosis, thrombocytopenia. Made 3 attempts to discuss po and weight hx with pt (getting test, using bathroom, now sleeping). Discussed care with RN.  Pt alert and oriented, tolerated sips of clears this am with meds. Hopefully pt will maintain his alertness as he goes through withdrawal and can continue with po intake. Labs, meds, skin reviewed. RN replacing lytes.    Plan/Recommendations:  Adv diet as tolerates  Offer nutrition supplements if intake is poor  Monitor lytes and replace as needed.    Will follow clinical course, nutrition needs.    CLINICAL NUTRITION ASSESSMENT      Reason for Assessment Nurse Admission Screen     Diagnosis/Problem   Seizure, alcohol withdrawal, SDH, SAH, metabolic acidosis, thrombocytopenia   Medical/Surgical History History reviewed. No pertinent past medical history.    History reviewed. No pertinent surgical history.     Anthropometrics        Current Height  Current Weight  BMI kg/m2 Height: 185.4 cm (73\")  Weight: 65.7 kg (144 lb 13.5 oz) (02/23/24 0311)  Body mass index is 19.11 kg/m².   Adjusted BMI (if applicable)    BMI Category Underweight (18.4 or below)   Ideal Body Weight (IBW) 80kg   Usual Body Weight (UBW) unknown   Weight Trend Unknown  per EMR 135lbs in 2022   Weight History Wt Readings from Last 30 Encounters:   02/23/24 0311 65.7 kg (144 lb 13.5 oz)   02/22/24 1954 74.8 kg (165 lb)      --  Labs       Pertinent Labs    Results from last 7 days   Lab Units 02/23/24  0300 02/22/24 2217 02/22/24 2013   SODIUM mmol/L 138 135* 137   POTASSIUM mmol/L 3.2* 3.2* 3.2*   CHLORIDE mmol/L 96* 96* 90*   CO2 mmol/L 20.9* 19.8* 14.9*   BUN mg/dL 5* 6 6   CREATININE mg/dL 0.74* 0.70* 0.88 "   CALCIUM mg/dL 8.3* 7.9* 8.5*   BILIRUBIN mg/dL 1.2  --  0.7   ALK PHOS U/L 244*  --  244*   ALT (SGPT) U/L 100*  --  78*   AST (SGOT) U/L 567*  --  224*   GLUCOSE mg/dL 89 65 125*     Results from last 7 days   Lab Units 02/23/24  0300 02/22/24 2217 02/22/24 2013   MAGNESIUM mg/dL  --  1.8 1.2*   PHOSPHORUS mg/dL  --  1.5*  --    HEMOGLOBIN g/dL 11.5*  --  12.7*   HEMATOCRIT % 33.4*  --  36.6*   WBC 10*3/mm3 5.13  --  4.98   TRIGLYCERIDES mg/dL 88  --   --    ALBUMIN g/dL 4.1  --  4.7     Results from last 7 days   Lab Units 02/23/24 0300 02/22/24 2013   INR  1.19*  --    APTT seconds 28.9  --    PLATELETS 10*3/mm3 81* 96*     SARS-CoV-2, REYMUNDO   Date Value Ref Range Status   03/29/2023 NEGATIVE Negative Final     Comment:     The 2019-CoV rRT-PCR Assay is only for use under a Food and Drug Administration Emergency Use Authorization. The performance characteristics of the assay were verified by the Clinical Microbiology Laboratory at the [facility].   Results should be used in conjunction with the patient's clinical symptoms, medical history, and other clinical/laboratory findings to determine an overall clinical diagnosis. Negative results do not preclude infection with SARS-CoV-2 (COVID-19).    Test parameters have not been validated for screening asymptomatic patients.     Lab Results   Component Value Date    HGBA1C 5.50 02/23/2024          Medications           Scheduled Medications docusate sodium, 100 mg, Oral, Daily  folic acid 1 mg in sodium chloride 0.9 % 50 mL IVPB, 1 mg, Intravenous, Daily  levETIRAcetam, 500 mg, Intravenous, Q12H  LORazepam, 2 mg, Intravenous, Q6H   Followed by  [START ON 2/24/2024] LORazepam, 1 mg, Intravenous, Q6H  pantoprazole, 40 mg, Intravenous, Q AM  potassium phosphate, 15 mmol, Intravenous, Q3H  sodium chloride, 10 mL, Intravenous, Q12H  thiamine (B-1) IV, 200 mg, Intravenous, Q8H   Followed by  [START ON 2/28/2024] thiamine, 100 mg, Oral, Daily        Infusions niCARdipine, 5-15 mg/hr  sodium chloride, 150 mL/hr, Last Rate: 150 mL/hr (02/23/24 0614)       PRN Medications   acetaminophen **OR** acetaminophen    aluminum-magnesium hydroxide-simethicone    bisacodyl    Calcium Replacement - Follow Nurse / BPA Driven Protocol    cloNIDine    influenza vaccine    LORazepam **OR** LORazepam **OR** LORazepam **OR** LORazepam **OR** LORazepam **OR** LORazepam **OR** LORazepam **OR** LORazepam    Magnesium Standard Dose Replacement - Follow Nurse / BPA Driven Protocol    ondansetron    Phosphorus Replacement - Follow Nurse / BPA Driven Protocol    Potassium Replacement - Follow Nurse / BPA Driven Protocol    sodium chloride    sodium chloride     Physical Findings          General Findings alert, oriented   Oral/Mouth Cavity WDL   Edema  not assessed   Gastrointestinal abdominal distension, last bowel movement: pending   Skin  other: laceration to eyebrow   Tubes/Drains/Lines none   NFPE Unable to perform due to: pt sleeping, curled up in a ball with cover up to neck   --  Current Nutrition Orders & Evaluation of Intake       Oral Nutrition     Food Allergies NKFA   Current PO Diet Diet: Liquid Diets; Clear Liquid; Fluid Consistency: Thin (IDDSI 0)   Supplement n/a   PO Evaluation     % PO Intake 100mL this am with meds    Factors Affecting Intake: altered GI function   --  PES STATEMENT / NUTRITION DIAGNOSIS      Nutrition Dx Problem  Problem: Predicted Suboptimal Intake  Etiology: Medical Diagnosis - Seizure, alcohol withdrawal, SDH, SAH, metabolic acidosis, thrombocytopenia    Signs/Symptoms: Report of Minimal PO Intake     NUTRITION INTERVENTION / PLAN OF CARE      Intervention Goal(s) Maintain nutrition status, Tolerate PO , Increase intake, Advance diet, and No significant weight loss         RD Intervention/Action Interview for preferences, Encourage intake, Continue to monitor, and Care plan reviewed   --      Prescription/Orders:       PO Diet       Supplements  Boost if intake is poor      Enteral Nutrition       Parenteral Nutrition    New Prescription Ordered? No, recommended   --      Monitor/Evaluation Per protocol   Discharge Plan/Needs Pending clinical course   --    RD to follow per protocol.      Electronically signed by:  Xi Solis RD  02/23/24 11:14 EST

## 2024-02-23 NOTE — CONSULTS
"Neurology Consult Note    Consult Date: 2/23/2024    Referring MD: Bernardo Zacarias MD    Reason for Consult I have been asked to see the patient in neurological consultation to render advice and opinion regarding seizures.    Nida Peck is a 29 y.o. -American male with known diagnosis of alcohol abuse, alcohol withdrawal seizures, alcoholic hepatitis, malnutrition, iron deficiency anemia, thrombocytopenia, Je Alvaro syndrome hold back from: Anxiety, depression, GERD, esophagitis who presented to the ED for evaluation of seizure like activity while at work.  Currently the patient was awake, alert and oriented and he was able to provide the history he stated like 3 days ago he drank 1 pint of vodka and then yesterday while at work he fell and had generalized tonic-clonic activity, he said he had similar episode of seizures in the past when he is not drinking for few days, he denied childhood epilepsy or family history of seizures.  He denied having aura prior to the seizures, he was not sure about semiology but he would lose consciousness and was told to have generalized tonic-clonic activity followed by postictal confusion.  His CT head revealed left subdural hematoma with midline shift of 6 mm as well as trace left subarachnoid, parafalcine and tentorial hemorrhage.  He was given 1 g of IV Keppra and 2 g of magnesium sulfate, currently neurosurgery consulted and following for the subdural hematoma.  Neurology being consulted for the seizures.    History reviewed. No pertinent past medical history.    Exam  /85   Pulse 86   Temp 98.8 °F (37.1 °C) (Oral)   Resp 16   Ht 185.4 cm (73\")   Wt 65.7 kg (144 lb 13.5 oz)   SpO2 100%   BMI 19.11 kg/m²   Gen: Depressed mood, swollen right eye, vitals reviewed  MS: oriented x3, recent/remote memory intact, normal attention/concentration, language intact, no neglect.  CN: visual acuity grossly normal, PERRL, EOMI, no facial droop, no " dysarthria  Motor: 5/5 throughout upper and lower extremities, normal tone    DATA:    Lab Results   Component Value Date    GLUCOSE 89 02/23/2024    CALCIUM 8.3 (L) 02/23/2024     02/23/2024    K 3.2 (L) 02/23/2024    CO2 20.9 (L) 02/23/2024    CL 96 (L) 02/23/2024    BUN 5 (L) 02/23/2024    CREATININE 0.74 (L) 02/23/2024    EGFRIFAFRI >60 01/13/2023    BCR 6.8 (L) 02/23/2024    ANIONGAP 21.1 (H) 02/23/2024     Lab Results   Component Value Date    WBC 5.13 02/23/2024    HGB 11.5 (L) 02/23/2024    HCT 33.4 (L) 02/23/2024    MCV 97.4 (H) 02/23/2024    PLT 81 (L) 02/23/2024       Lab review: Sodium 128, BUN 5, creatinine 0.74, WBC 4.98 yesterday today 5.13, alcohol level less than 10, urine drug screen negative, B12 726, CK2 36, lactate dehydrogenase elevated at 465, GGT elevated at 573.    Imaging review: I personally reviewed his CT scan of the head which showed left frontoparietal acute subdural hematoma 6 mm in thickness.  Radiology report reviewed.    IMPRESSION:  The pre-existing subdural hematoma overlying the left  cerebral hemisphere is unchanged in size measuring approximately 6 mm in  thickness. It has increased in attenuation however. Subarachnoid blood  overlying the left frontal lobe is noted, present previously and  unchanged. Continued surveillance is recommended.    Diagnoses:  -Generalized tonic-clonic seizure likely from alcohol withdrawal  -Left hemispheric subdural hematoma  -Alcohol dependence  -Alcohol withdrawal  -Alcoholic hepatitis      PLAN:   1.  Started on Keppra 500 twice daily  2.  Continuous EEG  3.  MRI brain with and without contrast  4.  Management of subdural hematoma as per the neurosurgery team  5.  Counseled the patient regarding alcohol abuse  6.  Continue thiamine replacement and CIWA protocol    Discussed with patient, ICU attending Dr. Wynne    Medical Decision Making for this neurology consultation consists of the following:  Review of previous chart, including H/P,  provider and nursing notes as applicable.  Review of medications and vital signs.  Review of previous labs, neuroimaging, and additional relevant diagnostics.   Interpretation of laboratory, imaging, and other diagnostic results  Discussion with other providers and family   Total face-to-face/floor time: 60 minutes.

## 2024-02-23 NOTE — PROGRESS NOTES
Received call on Mr. Peck for bilateral subdural hematomas with 6 mm midline shift and small parafalcine subdural hematoma.  Nursing reports patient is neurologically stable at this time but somewhat drowsy.  States the patient is being treated for alcohol withdrawal and reportedly had a seizure.  I ordered neurology consult for seizure management as well as systolic blood pressure to be maintained less than 140.  Intensivist to manage seizure prophylaxis until neurology involved.  Last CT head was completed at 0019 AM.  We will repeat another head CT approximately 8 hours from this time at 8:30 AM.  Nursing to call for any neurologic change or decline.

## 2024-02-23 NOTE — THERAPY EVALUATION
Acute Care - Speech Language Pathology   Swallow Initial Evaluation [facility]     Patient Name: Nida Peck  : 1995  MRN: [id number]  Today's Date: 2024               Admit Date: 2024    Visit Dx:     ICD-10-CM ICD-9-CM   1. Subdural hematoma  S06.5XAA 432.1   2. Seizure-like activity  R56.9 780.39   3. Hypomagnesemia  E83.42 275.2   4. Hypokalemia  E87.6 276.8   5. Elevated liver enzymes  R74.8 790.5   6. Macrocytic anemia  D53.9 281.9   7. Hyperglycemia  R73.9 790.29   8. Thrombocytopenia  D69.6 287.5   9. Laceration of forehead, initial encounter  S01.81XA 873.42   10. Subarachnoid bleed  I60.9 430     Patient Active Problem List   Diagnosis    Subdural hematoma     History reviewed. No pertinent past medical history.  History reviewed. No pertinent surgical history.    SLP Recommendation and Plan  SLP Swallowing Diagnosis: functional pharyngeal phase, functional oral phase (24)  SLP Diet Recommendation: regular textures, thin liquids (24)  Recommended Precautions and Strategies: small bites of food and sips of liquid, upright posture during/after eating (24)  SLP Rec. for Method of Medication Administration: meds whole, as tolerated (24)     Monitor for Signs of Aspiration: yes (24)  Recommended Diagnostics: SLE/Cog/Motor Speech Evaluation (24)  Swallow Criteria for Skilled Therapeutic Interventions Met: demonstrates skilled criteria (24)  Anticipated Discharge Disposition (SLP): unknown (24)  Rehab Potential/Prognosis, Swallowing: good, to achieve stated therapy goals (24)  Therapy Frequency (Swallow): PRN (24)  Predicted Duration Therapy Intervention (Days): until discharge (24)  Oral Care Recommendations: Oral Care BID/PRN (24)                                        Plan of Care Reviewed With: patient  Outcome Evaluation: Clinical swallow eval completed. Pt  given trials of thin (cup/straw), pureed, soft, mixed, and regular consistencies. Pt showed no overt s/s. Vocal quality remained clear throughout trials. Mastication was slow, but functional for regular solids. [place] elevation was adequate with palpation. Recommend advance to regular and thin; meds as tolerated; upright for meals and 30 min after; slow rate; small bites/sips. Monitor for s/s of aspiration. ST to follow for tolerance and need for cog eval.      SWALLOW EVALUATION (last 72 hours)       SLP Adult Swallow Evaluation       Row Name 02/23/24 1400                   Rehab Evaluation    Document Type evaluation  -AW        Subjective Information no complaints  -AW        Patient Observations alert;cooperative;agree to therapy  -AW        Patient/Family/Caregiver Comments/Observations Pt was oriented, but slow to respond.  -AW        Patient Effort good  -AW        Symptoms Noted During/After Treatment none  -AW           General Information    Patient Profile Reviewed yes  -AW        Pertinent History Of Current Problem Pt admitted after a syncopal episode at work with seizure activity. CT showed SDH with midline shift. PMH: alcohol abuse, Sandoval Alvaro syndrome, GERD  -AW        Current Method of Nutrition full liquids  -AW        Precautions/Limitations, Vision WFL;for purposes of eval  -AW        Precautions/Limitations, Hearing WFL;for purposes of eval  -AW        Prior Level of Function-Communication WFL  -AW        Prior Level of Function-Swallowing no diet consistency restrictions  -AW        Plans/Goals Discussed with patient;agreed upon  -AW        Barriers to Rehab none identified  -AW        Patient's Goals for Discharge patient did not state  -AW           Pain    Additional Documentation Pain Scale: Numbers Pre/Post-Treatment (Group)  -AW           Pain Scale: Numbers Pre/Post-Treatment    Pretreatment Pain Rating 7/10  -AW        Posttreatment Pain Rating 7/10  -AW        Pain Location -  face  -AW        Pain Intervention(s) Repositioned  -AW           Oral Motor Structure and Function    Dentition Assessment natural, present and adequate  -AW        Secretion Management WNL/WFL  -AW        Mucosal Quality moist, healthy  -AW           Oral Musculature and Cranial Nerve Assessment    Oral Motor General Assessment generalized oral motor weakness;other (see comments)  limited ROM due to biting lip during seizure  -AW           General Eating/Swallowing Observations    Eating/Swallowing Skills self-fed;fed by SLP  -AW        Positioning During Eating upright in bed  -AW        Utensils Used spoon;cup;straw  -AW        Consistencies Trialed regular textures;soft to chew textures;mixed consistency;pureed;thin liquids  -AW           Clinical Swallow Eval    Clinical Swallow Evaluation Summary Clinical swallow eval completed. Pt given trials of thin (cup/straw), pureed, soft, mixed, and regular consistencies. Pt showed no overt s/s. Vocal quality remained clear throughout trials. Mastication was slow, but functional for regular solids. [place] elevation was adequate with palpation. Recommend advance to regular and thin; meds as tolerated; upright for meals and 30 min after; slow rate; small bites/sips. Monitor for s/s of aspiration. ST to follow for tolerance and need for cog eval.  -AW           SLP Evaluation Clinical Impression    SLP Swallowing Diagnosis functional pharyngeal phase;functional oral phase  -AW        Functional Impact risk of aspiration/pneumonia  -AW        Rehab Potential/Prognosis, Swallowing good, to achieve stated therapy goals  -AW        Swallow Criteria for Skilled Therapeutic Interventions Met demonstrates skilled criteria  -AW           Recommendations    Therapy Frequency (Swallow) PRN  -AW        Predicted Duration Therapy Intervention (Days) until discharge  -AW        SLP Diet Recommendation regular textures;thin liquids  -AW        Recommended Diagnostics SLE/Cog/Motor  Speech Evaluation  -AW        Recommended Precautions and Strategies small bites of food and sips of liquid;upright posture during/after eating  -AW        Oral Care Recommendations Oral Care BID/PRN  -AW        SLP Rec. for Method of Medication Administration meds whole;as tolerated  -AW        Monitor for Signs of Aspiration yes  -AW        Anticipated Discharge Disposition (SLP) unknown  -AW           (STG) Patient will tolerate trials of    Consistencies Trialed (Tolerate trials) regular textures;thin liquids  -AW        Desired Outcome (Tolerate trials) without signs/symptoms of aspiration  -AW        [place] (Tolerate trials) independently (over 90% accuracy)  -AW        Time Frame (Tolerate trials) by discharge  -AW                  User Key  (r) = Recorded By, (t) = Taken By, (c) = Cosigned By      Initials Name Effective Dates    Laxmi Pillai, SLP 08/28/23 -                     EDUCATION  The patient has been educated in the following areas:   Dysphagia (Swallowing Impairment) Oral Care/Hydration.        SLP GOALS       Row Name 02/23/24 1400             (STG) Patient will tolerate trials of    Consistencies Trialed (Tolerate trials) regular textures;thin liquids  -AW      Desired Outcome (Tolerate trials) without signs/symptoms of aspiration  -AW      [place] (Tolerate trials) independently (over 90% accuracy)  -AW      Time Frame (Tolerate trials) by discharge  -AW                User Key  (r) = Recorded By, (t) = Taken By, (c) = Cosigned By      Initials Name Provider Type    Laxmi Pillai, SLP Speech and Language Pathologist                       Time Calculation:    Time Calculation- SLP       Row Name 02/23/24 1430             Time Calculation- SLP    SLP Start Time 1330  -AW      SLP Received On 02/23/24  -AW                User Key  (r) = Recorded By, (t) = Taken By, (c) = Cosigned By      Initials Name Provider Type    Laxmi Pillai, SLP Speech and Language Pathologist                     Therapy Charges for Today       Code Description Service Date Service Provider Modifiers Qty    99644813627  ST EVAL ORAL PHARYNG SWALLOW 4 2/23/2024 Laxmi Amaya, SLP GN 1                 Laxmi Amaya, SLP  2/23/2024

## 2024-02-23 NOTE — THERAPY EVALUATION
Patient Name: Nida Peck  : 1995    MRN: [id number]                              Today's Date: 2024       Admit Date: 2024    Visit Dx:     ICD-10-CM ICD-9-CM   1. Subdural hematoma  S06.5XAA 432.1   2. Seizure-like activity  R56.9 780.39   3. Hypomagnesemia  E83.42 275.2   4. Hypokalemia  E87.6 276.8   5. Elevated liver enzymes  R74.8 790.5   6. Macrocytic anemia  D53.9 281.9   7. Hyperglycemia  R73.9 790.29   8. Thrombocytopenia  D69.6 287.5   9. Laceration of forehead, initial encounter  S01.81XA 873.42   10. Subarachnoid bleed  I60.9 430     Patient Active Problem List   Diagnosis    Subdural hematoma     History reviewed. No pertinent past medical history.  History reviewed. No pertinent surgical history.   General Information       Row Name 24 1153          OT Time and Intention    Document Type evaluation  -     Mode of Treatment co-treatment;physical therapy;occupational therapy  -       Row Name 24 1153          General Information    Patient Profile Reviewed yes  -KA     Prior Level of Function independent:;ADL's  -     Existing Precautions/Restrictions fall  -     Barriers to Rehab medically complex;cognitive status  -       Row Name 24 1153          Living Environment    People in Home friend(s)  -       Row Name 24 1153          Cognition    Orientation Status (Cognition) oriented to;person;place  -       Row Name 24 1153          Safety Issues, Functional Mobility    Impairments Affecting Function (Mobility) balance;strength;endurance/activity tolerance  -     Comment, Safety Issues/Impairments (Mobility) [place] medically appropriate and necessary due to pt acuity, activity tolerance, to maximize mobility efforts and safety of pt and staff. Focus on progression of care and goals established in plan of care.  -PRIMITIVO               User Key  (r) = Recorded By, (t) = Taken By, (c) = Cosigned By      Initials Name Provider Type    PRIMITIVO Riggins  JONATHAN Pyle Occupational Therapist                     Mobility/ADL's       [facility] 02/23/24 1200          Bed Mobility    Supine-Sit [place] (Bed Mobility) minimum assist (75% patient effort);moderate assist (50% patient effort);2 person assist;verbal cues  -     Sit-Supine [place] (Bed Mobility) moderate assist (50% patient effort);2 person assist;verbal cues  -     Assistive Device (Bed Mobility) bed rails;head of bed elevated  -KA       Row Name 02/23/24 1200          Transfers    Comment, (Transfers) Unable to attempt. HR was 158 sitting EOB  -KA       Row Name 02/23/24 1200          Bed-Chair Transfer    Bed-Chair [place] (Transfers) unable to assess  -KA       Row Name 02/23/24 [facility]          Sit-Stand Transfer    Sit-Stand [place] (Transfers) unable to assess  -KA       Row Name 02/23/24 [facility]          Functional Mobility    Functional Mobility- Ind. Level not tested  -KA       Row Name 02/23/24 1200          Activities of Daily Living    BADL Assessment/Intervention toileting;grooming  -KA       Row Name 02/23/24 [facility]          Toileting Assessment/Training    [place] Level (Toileting) adjust/manage clothing;perform perineal hygiene;change pad/brief;maximum assist (25% patient effort);toileting skills  -KA       Row Name 02/23/24 1200          Grooming Assessment/Training    [place] Level (Grooming) contact guard assist;wash face, hands;grooming skills  -     Position (Grooming) edge of bed sitting  -               User Key  (r) = Recorded By, (t) = Taken By, (c) = Cosigned By      Initials Name Provider Type     Lashanda Riggins OT Occupational Therapist                   Obj/Interventions       [facility] Name 02/23/24 1214          Range of Motion Comprehensive    Comment, General Range of Motion BUE grossly 90 degrees shoulder flexion  -KA       Row Name 02/23/24 1214          Strength Comprehensive (MMT)    General Manual Muscle Testing (MMT) Assessment upper extremity  strength deficits identified  -     Comment, General Manual Muscle Testing (MMT) Assessment BUE grossly 3/5  -       Row Name 02/23/24 1214          Motor Skills    Motor Skills functional endurance  -     Functional Endurance poor. High heart rate with activity  -       Row Name 02/23/24 1214          Balance    Static Sitting Balance standby assist;verbal cues  -     Dynamic Sitting Balance verbal cues;contact guard  -     Position, Sitting Balance unsupported;sitting edge of bed  -     Balance Interventions sitting;occupation based/functional task  -               User Key  (r) = Recorded By, (t) = Taken By, (c) = Cosigned By      Initials Name Provider Type    Lashanda Guillermo, OT Occupational Therapist                   Goals/Plan       Row Name 02/23/24 1231          Bed Mobility Goal 1 (OT)    Activity/Assistive Device (Bed Mobility Goal 1, OT) bed mobility activities, all  -KA     [place] Level/Cues Needed (Bed Mobility Goal 1, OT) modified independence  -KA     Time Frame (Bed Mobility Goal 1, OT) short term goal (STG);2 weeks  -       Row Name 02/23/24 1231          Transfer Goal 1 (OT)    Activity/Assistive Device (Transfer Goal 1, OT) transfers, all  -     [place] Level/Cues Needed (Transfer Goal 1, OT) modified independence  -KA     Time Frame (Transfer Goal 1, OT) short term goal (STG);2 weeks  -     Progress/Outcome (Transfer Goal 1, OT) new goal  -       Row Name 02/23/24 1231          Dressing Goal 1 (OT)    Activity/Device (Dressing Goal 1, OT) dressing skills, all;upper body dressing;lower body dressing  -     [place]/Cues Needed (Dressing Goal 1, OT) modified independence  -KA     Time Frame (Dressing Goal 1, OT) short term goal (STG);2 weeks  -     Progress/Outcome (Dressing Goal 1, OT) new goal  -       Row Name 02/23/24 1231          Toileting Goal 1 (OT)    Activity/Device (Toileting Goal 1, OT) toileting skills, all  -KA     [place]  Level/Cues Needed (Toileting Goal 1, OT) modified independence  -     Time Frame (Toileting Goal 1, OT) short term goal (STG);2 weeks  -KA     Progress/Outcome (Toileting Goal 1, OT) new goal  -       Row Name 02/23/24 1231          Grooming Goal 1 (OT)    Activity/Device (Grooming Goal 1, OT) grooming skills, all  -KA     [place] (Grooming Goal 1, OT) modified independence  -     Time Frame (Grooming Goal 1, OT) short term goal (STG);2 weeks  -KA     Progress/Outcome (Grooming Goal 1, OT) new goal  -       Row Name 02/23/24 1231          Therapy Assessment/Plan (OT)    Planned Therapy Interventions (OT) activity tolerance training;BADL retraining;functional balance retraining;occupation/activity based interventions;patient/caregiver education/training;strengthening exercise;transfer/mobility retraining  -               User Key  (r) = Recorded By, (t) = Taken By, (c) = Cosigned By      Initials Name Provider Type    Lashanda Guillermo, OT Occupational Therapist                   Clinical Impression       Row Name 02/23/24 1218          Pain Assessment    Pretreatment Pain Rating 0/10 - no pain  -     Posttreatment Pain Rating 0/10 - no pain  -       Row Name 02/23/24 1218          Plan of Care Review    Plan of Care Reviewed With patient  -     Outcome Evaluation Pt seen for OT maxine this AM. Admitted after syncope with seizure activity and SDH. PMHx includes ETOH abuse. He reports he was independent prior with ADLs and used no AD at baseline. Pt slow to respond to questions but able to state name and place. Today he performed bed mobility with min-mod Ax2 and sat EOB with CGA for static sitting balance. Required CGA for simple grooming task. Pt's HR was 158 while seated EOB. Did not attempt STS or mobility due to high heart rate. Pt presents with decreased endurance, strength, activity tolerance, functional mobility and ADL performance.  -       Row Name 02/23/24 1218          Therapy  Assessment/Plan (OT)    Rehab Potential (OT) good, to achieve stated therapy goals  -     Criteria for Skilled Therapeutic Interventions Met (OT) yes  -     Therapy Frequency (OT) 5 times/wk  -       Row Name 02/23/24 1218          Therapy Plan Review/Discharge Plan (OT)    Anticipated Discharge Disposition (OT) --  pending progress in acute  -       Row Name 02/23/24 1218          Vital Signs    Pre Patient Position Supine  -KA     Intra Patient Position Sitting  -KA     Post Patient Position Supine  -       Row Name 02/23/24 1218          Positioning and Restraints    Pre-Treatment Position in bed  -KA     Post Treatment Position bed  -KA     In Bed notified nsg;supine;call light within reach;encouraged to call for assist;exit alarm on;with nsg  -               User Key  (r) = Recorded By, (t) = Taken By, (c) = Cosigned By      Initials Name Provider Type    Lashanda Guillermo, OT Occupational Therapist                   Outcome Measures       Row Name 02/23/24 1231          How much help from another is currently needed...    Putting on and taking off regular lower body clothing? 2  -KA     Bathing (including washing, rinsing, and drying) 2  -KA     Toileting (which includes using toilet bed pan or urinal) 2  -KA     Putting on and taking off regular upper body clothing 3  -KA     Taking care of personal grooming (such as brushing teeth) 3  -KA     Eating meals 3  -KA     AM-PAC 6 Clicks Score (OT) 15  -KA       Row Name 02/23/24 1029 02/23/24 0302       How much help from another person do you currently need...    Turning from your back to your side while in flat bed without using bedrails? 3  -DJ 4  -BJ    Moving from lying on back to sitting on the side of a flat bed without bedrails? 3  -DJ 4  -BJ    Moving to and from a bed to a chair (including a wheelchair)? 2  -DJ 4  -BJ    Standing up from a chair using your arms (e.g., wheelchair, bedside chair)? 2  -DJ 4  -BJ    Climbing 3-5 steps with a  railing? 2  -DJ 4  -BJ    To walk in hospital room? 2  -DJ 4  -BJ    AM-PAC 6 Clicks Score (PT) 14  -DJ 24  -BJ    Highest Level of Mobility Goal 4 --> Transfer to chair/commode  -DJ 8 --> Walked 250 feet or more  -      Row Name 02/23/24 1232          Modified [place] Scale    Modified [place] Scale 4 - Moderately severe disability.  Unable to walk without assistance, and unable to attend to own bodily needs without assistance.  -       Row Name 02/23/24 1232 02/23/24 1231       Functional Assessment    Outcome Measure Options Modified [place]  - AM-PAC 6 Clicks Daily Activity (OT)  -      Row Name 02/23/24 1029          Functional Assessment    Outcome Measure Options AM-PAC 6 Clicks Basic Mobility (PT)  -               User Key  (r) = Recorded By, (t) = Taken By, (c) = Cosigned By      Initials Name Provider Type    Saida Marie, PT Physical Therapist    Terra Redmond, RN Registered Nurse    Lashanda Guillermo, OT Occupational Therapist                      OT Recommendation and Plan  Planned Therapy Interventions (OT): activity tolerance training, BADL retraining, functional balance retraining, occupation/activity based interventions, patient/caregiver education/training, strengthening exercise, transfer/mobility retraining  Therapy Frequency (OT): 5 times/wk  Plan of Care Review  Plan of Care Reviewed With: patient  Outcome Evaluation: Pt seen for OT eval this AM. Admitted after syncope with seizure activity and SDH. PMHx includes ETOH abuse. He reports he was independent prior with ADLs and used no AD at baseline. Pt slow to respond to questions but able to state name and place. Today he performed bed mobility with min-mod Ax2 and sat EOB with CGA for static sitting balance. Required CGA for simple grooming task. Pt's HR was 158 while seated EOB. Did not attempt STS or mobility due to high heart rate. Pt presents with decreased endurance, strength, activity tolerance, functional mobility and ADL  performance.     Time Calculation:   Evaluation Complexity (OT)  Review Occupational Profile/Medical/Therapy History Complexity: expanded/moderate complexity  Assessment, Occupational Performance/Identification of Deficit Complexity: 3-5 performance deficits  Clinical Decision Making Complexity (OT): detailed assessment/moderate complexity  Overall Complexity of Evaluation (OT): moderate complexity     Time Calculation- OT       Row Name 02/23/24 1233             Time Calculation- OT    OT Start Time 0902  -KA      OT Stop Time 0916  -KA      OT Time Calculation (min) 14 min  -KA      OT Received On 02/23/24  -KA      OT - Next Appointment 02/26/24  -KA      OT Goal Re-Cert Due Date 03/08/24  -KA         Timed Charges    72670 - OT Therapeutic Activity Minutes 8  -KA         Untimed Charges    OT Eval/Re-eval Minutes 6  -KA         Total Minutes    Timed Charges Total Minutes 8  -KA      Untimed Charges Total Minutes 6  -KA       Total Minutes 14  -KA                User Key  (r) = Recorded By, (t) = Taken By, (c) = Cosigned By      Initials Name Provider Type     Lashanda Riggins OT Occupational Therapist                  Therapy Charges for Today       Code Description Service Date Service Provider Modifiers Qty    03681530998  OT THERAPEUTIC ACT EA 15 MIN 2/23/2024 Lashanda Riggins OT GO 1    [id number] HC OT EVAL MOD COMPLEXITY 2 2/23/2024 Lashanda Riggins OT GO 1                 Lashanda Riggins OT  2/23/2024

## 2024-02-23 NOTE — PLAN OF CARE
Goal Outcome Evaluation:  Plan of Care Reviewed With: patient           Outcome Evaluation: Pt seen for OT maxine this AM. Admitted after syncope with seizure activity and SDH. PMHx includes ETOH abuse. He reports he was independent prior with ADLs and used no AD at baseline. Pt slow to respond to questions but able to state name and place. Today he performed bed mobility with min-mod Ax2 and sat EOB with CGA for static sitting balance. Required CGA for simple grooming task. Pt's HR was 158 while seated EOB. Did not attempt STS or mobility due to high heart rate. Pt presents with decreased endurance, strength, activity tolerance, functional mobility and ADL performance.      Anticipated Discharge Disposition (OT):  (pending progress in acute)

## 2024-02-23 NOTE — ED PROVIDER NOTES
EMERGENCY DEPARTMENT ENCOUNTER  Room Number:  13/13  Date of encounter:  2/22/2024  PCP: Provider, No Known  Patient Care Team:  Provider, No Known as PCP - General     HPI:  Context: Nida Peck is a 29 y.o. male who presents to the ED c/o chief complaint of seizure.  Patient reports he does not remember the event, he was at work when the next thing he knows he woke up and EMS was there and he was on a stretcher.  Per report, patient had seizure, was unconscious for several minutes.  Patient reports that he did bite his lip, denies any injury to his tongue, no incontinence.  Patient reports that he was confused for extended period afterwards.  Patient denied any chest pain or shortness of breath, does report he feels his heart racing, denies any irregularity.  Patient reports he was having nausea vomiting and diarrhea for the last 2 days, no abdominal pain, no fevers.  Patient reports that he drank some alcohol last night, no more recent alcohol, no illicit substances.  Patient reports that he believes that he had a seizure several years ago, is unsure if he was evaluated by Dr.  Patient denies any prior neurology evaluations, has never been on antiepileptics.    MEDICAL HISTORY REVIEW  Reviewed in EPIC    PAST MEDICAL HISTORY  Active Ambulatory Problems     Diagnosis Date Noted    No Active Ambulatory Problems     Resolved Ambulatory Problems     Diagnosis Date Noted    No Resolved Ambulatory Problems     No Additional Past Medical History       PAST SURGICAL HISTORY  History reviewed. No pertinent surgical history.    FAMILY HISTORY  History reviewed. No pertinent family history.    SOCIAL HISTORY  Social History     Socioeconomic History    Marital status: Single       ALLERGIES  Penicillins and Sulfa antibiotics    The patient's allergies have been reviewed    REVIEW OF SYSTEMS  All systems reviewed and negative except for those discussed in HPI.     PHYSICAL EXAM  I have reviewed the triage vital signs and  nursing notes.  ED Triage Vitals [02/22/24 1954]   Temp Heart Rate Resp BP SpO2   98.7 °F (37.1 °C) (!) 126 16 147/97 97 %      Temp src Heart Rate Source Patient Position BP Location FiO2 (%)   Oral Monitor Lying Right arm --       General: No acute distress.  HENT: NCAT, PERRL, Nares patent.  Contusion to the lower lip, no laceration.  Eyes: no scleral icterus.  Neck: trachea midline, no ROM limitations.  CV: regular rhythm, regular rate.  Respiratory: normal effort, CTAB.  Abdomen: soft, nondistended, NTTP, no rebound tenderness, no guarding or rigidity.  Musculoskeletal: no deformity.  Neuro: alert, moves all extremities, follows commands.  Skin: warm, dry.    LAB RESULTS  Recent Results (from the past 24 hour(s))   Comprehensive Metabolic Panel    Collection Time: 02/22/24  8:13 PM    Specimen: Blood   Result Value Ref Range    Glucose 125 (H) 65 - 99 mg/dL    BUN 6 6 - 20 mg/dL    Creatinine 0.88 0.76 - 1.27 mg/dL    Sodium 137 136 - 145 mmol/L    Potassium 3.2 (L) 3.5 - 5.2 mmol/L    Chloride 90 (L) 98 - 107 mmol/L    CO2 14.9 (L) 22.0 - 29.0 mmol/L    Calcium 8.5 (L) 8.6 - 10.5 mg/dL    Total Protein 7.6 6.0 - 8.5 g/dL    Albumin 4.7 3.5 - 5.2 g/dL    ALT (SGPT) 78 (H) 1 - 41 U/L    AST (SGOT) 224 (H) 1 - 40 U/L    Alkaline Phosphatase 244 (H) 39 - 117 U/L    Total Bilirubin 0.7 0.0 - 1.2 mg/dL    Globulin 2.9 gm/dL    A/G Ratio 1.6 g/dL    BUN/Creatinine Ratio 6.8 (L) 7.0 - 25.0    Anion Gap 32.1 (H) 5.0 - 15.0 mmol/L    eGFR 117.9 >60.0 mL/min/1.73   Ethanol    Collection Time: 02/22/24  8:13 PM    Specimen: Blood   Result Value Ref Range    Ethanol <10 0 - 10 mg/dL    Ethanol % <0.010 %   Magnesium    Collection Time: 02/22/24  8:13 PM    Specimen: Blood   Result Value Ref Range    Magnesium 1.2 (L) 1.6 - 2.6 mg/dL   CBC Auto Differential    Collection Time: 02/22/24  8:13 PM    Specimen: Blood   Result Value Ref Range    WBC 4.98 3.40 - 10.80 10*3/mm3    RBC 3.75 (L) 4.14 - 5.80 10*6/mm3    Hemoglobin  12.7 (L) 13.0 - 17.7 g/dL    Hematocrit 36.6 (L) 37.5 - 51.0 %    MCV 97.6 (H) 79.0 - 97.0 fL    MCH 33.9 (H) 26.6 - 33.0 pg    MCHC 34.7 31.5 - 35.7 g/dL    RDW 15.2 12.3 - 15.4 %    RDW-SD 53.9 37.0 - 54.0 fl    MPV 10.6 6.0 - 12.0 fL    Platelets 96 (L) 140 - 450 10*3/mm3    Neutrophil % 63.3 42.7 - 76.0 %    Lymphocyte % 23.3 19.6 - 45.3 %    Monocyte % 11.4 5.0 - 12.0 %    Eosinophil % 1.0 0.3 - 6.2 %    Basophil % 0.4 0.0 - 1.5 %    Neutrophils, Absolute 3.15 1.70 - 7.00 10*3/mm3    Lymphocytes, Absolute 1.16 0.70 - 3.10 10*3/mm3    Monocytes, Absolute 0.57 0.10 - 0.90 10*3/mm3    Eosinophils, Absolute 0.05 0.00 - 0.40 10*3/mm3    Basophils, Absolute 0.02 0.00 - 0.20 10*3/mm3   ECG 12 Lead Syncope    Collection Time: 02/22/24  8:47 PM   Result Value Ref Range    QT Interval 351 ms    QTC Interval 451 ms   Basic Metabolic Panel    Collection Time: 02/22/24 10:17 PM    Specimen: Blood   Result Value Ref Range    Glucose 65 65 - 99 mg/dL    BUN 6 6 - 20 mg/dL    Creatinine 0.70 (L) 0.76 - 1.27 mg/dL    Sodium 135 (L) 136 - 145 mmol/L    Potassium 3.2 (L) 3.5 - 5.2 mmol/L    Chloride 96 (L) 98 - 107 mmol/L    CO2 19.8 (L) 22.0 - 29.0 mmol/L    Calcium 7.9 (L) 8.6 - 10.5 mg/dL    BUN/Creatinine Ratio 8.6 7.0 - 25.0    Anion Gap 19.2 (H) 5.0 - 15.0 mmol/L    eGFR 126.3 >60.0 mL/min/1.73       I ordered the above labs and reviewed the results.    RADIOLOGY  XR Chest 1 View    Result Date: 2/22/2024  XR CHEST 1 VW-  HISTORY: 29-year-old male with seizure.  FINDINGS: There is no evidence for pneumonia, effusions, CHF, or pneumothorax.  This report was finalized on 2/22/2024 8:45 PM by Dr. Kimmy Carmona M.D on Workstation: [id number]      CT Head Without Contrast    Result Date: 2/22/2024  CT HEAD WO CONTRAST-  INDICATIONS: Seizure  TECHNIQUE: Radiation dose reduction techniques were utilized, including automated exposure control and exposure modulation based on body size. Noncontrast head CT  COMPARISON: None available   FINDINGS:    No acute intracranial hemorrhage, midline shift or mass effect. No acute territorial infarct is identified.   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 Ventricles, cisterns, cerebral sulci are otherwise unremarkable for patient age.  The visualized paranasal sinuses, orbits, mastoid air cells are unremarkable.        No acute intracranial hemorrhage or hydrocephalus. Cyst of the septum pellucidum.  For further evaluation of the cause of patient's seizure, if indicated, enhanced MRI is advised.  This report was finalized on 2/22/2024 8:41 PM by Dr. Albert Moran M.D on Workstation: [email or web address]       I ordered the above noted radiological studies. I reviewed the images and results. I agree with the radiologist interpretation.    PROCEDURES  Procedures    MEDICATIONS GIVEN IN ER  Medications   sodium chloride 0.9 % bolus 1,000 mL (0 mL Intravenous Stopped 2/22/24 2117)   magnesium sulfate 2g/50 mL (PREMIX) infusion (2 g Intravenous New Bag 2/22/24 2120)   potassium chloride (K-DUR,KLOR-CON) ER tablet 40 mEq (40 mEq Oral Given 2/22/24 2118)   magnesium oxide (MAG-OX) tablet 800 mg (800 mg Oral Given 2/22/24 2118)       PROGRESS, DATA ANALYSIS, CONSULTS, AND MEDICAL DECISION MAKING  A complete history and physical exam have been performed.  All available laboratory and imaging results have been reviewed by myself prior to disposition.    MDM    After the initial H&P, I discussed pertinent information from history and physical exam with patient/family.  Discussed differential diagnosis.  Discussed plan for ED evaluation/workup/treatment.  All questions answered.  Patient/family is agreeable with plan.  ED Course as of 02/27/24 0829   Thu Feb 22, 2024 2002 My differential diagnosis for seizure includes but is not limited to:   Seizure  Syncope  Transient ischemic attack (particularly in older adults)  Migraine  Panic attack and  anxiety  Psychogenic nonepileptic seizure  Transient global amnesia (rare before the age of 50 years)  Narcolepsy with cataplexy  Paroxysmal movement disorders         [JG]   2053 EKG independently viewed and contemporaneously interpreted by ED physician. Time: 8:47 PM.  Rate 99.  Interpretation: Normal sinus rhythm, normal axis, normal QRS, benign early repolarization. [JG]   2104 Hypokalemia, repleted orally, hypomagnesemia, repleted orally as well as IV. [JG]   2105 ALT elevation of liver enzymes, AST elevated to 24, ALT at 78, bilirubin normal.  Patient also has microcytic anemia with hypokalemia and hypomagnesia anemia.  Constellation of labs is concerning for chronic alcohol abuse. [JG]   2106 I reviewed chest x-ray imaging in PACS, no pulmonary infiltrates per my read. [JG]   2106 I reviewed CT head imaging in PACS, no intracranial hemorrhage per my read. [JG]   2106 High anion gap metabolic acidosis, I suspect that this is likely secondary to lactic acidosis from seizure, patient receiving IV fluids, obtaining repeat BMP. [JG]   2247 Patient reassessed, discussed ED workup and results to present, discussed pending repeat BMP.  Patient does report that he drinks, sometimes daily.  Patient denies any history of alcohol withdrawal or seizures, reports last year he quit drinking for several months and did not have any withdrawal symptoms at that time.  Patient has no questions or concerns at present. [JG]   2253 Anion gap significantly improved. [JG]   2254 Patient reassessed, discussed ED workup and results.  Discussed plan for discharge.  Discussed need for close follow-up with primary care, discussed referral to neurology.  Discussed new onset seizure precautions.  Given resources to help out with alcohol cessation.  Extensive discussion return precautions, discharging. [JG]   2335 Patient had seizure while ambulating out of department.  Patient fell and struck his head.  Patient now confused.  Patient noted  to have contusion to forehead and laceration above right eyebrow [TJ]   Fri Feb 23, 2024   0101 Discussed with Dr. Celeste.  Will administer Keppra.  Will admit to ICU. [TJ]   0219 Laceration repair per midlevel Paula Burns [TJ]      ED Course User Index  [JG] Bernardo Zacarias MD  [TJ] Gabriel Mckeon MD       AS OF 22:56 EST VITALS:    BP - 127/86  HR - 102  TEMP - 98.7 °F (37.1 °C) (Oral)  O2 SATS - 99%    DIAGNOSIS  Final diagnoses:   Seizure-like activity   Hypomagnesemia   Hypokalemia   Elevated liver enzymes   Macrocytic anemia   Hyperglycemia   Thrombocytopenia         DISPOSITION  DISCHARGE    Patient discharged in stable condition.    Reviewed implications of results, diagnosis, meds, responsibility to follow up, warning signs and symptoms of possible worsening, potential complications and reasons to return to ER.    Patient/Family voiced understanding of above instructions.    Discussed plan for discharge, as there is no emergent indication for admission. Patient referred to primary care provider for BP management due to today's BP. Pt/family is agreeable and understands need for follow up and repeat testing.  Pt is aware that discharge does not mean that nothing is wrong but it indicates no emergency is present that requires admission and they must continue care with follow-up as given below or physician of their choice.     FOLLOW-UP  Your PCP    Schedule an appointment as soon as possible for a visit in 2 days  even if well    PATIENT CONNECTION - [facility] [id number] [phone]    if you are unable to follow up with your PCP    [facility] NEUROLOGY  [address]  [facility] [address]  [facility] [address] [phone]  Schedule an appointment as soon as possible for a visit in 2 days      [facility]  [address] [place] [address] [phone]  Go in 1 day  for further management of your EtOH abuse         Medication List       No changes were made to your prescriptions during this visit.            Bernardo Zacarias MD  02/22/24 3170       Bernardo Zacarias MD  02/27/24 4802
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
## 2024-02-23 NOTE — THERAPY EVALUATION
Patient Name: Nida Peck  : 1995    MRN: [id number]                              Today's Date: 2024       Admit Date: 2024    Visit Dx:     ICD-10-CM ICD-9-CM   1. Subdural hematoma  S06.5XAA 432.1   2. Seizure-like activity  R56.9 780.39   3. Hypomagnesemia  E83.42 275.2   4. Hypokalemia  E87.6 276.8   5. Elevated liver enzymes  R74.8 790.5   6. Macrocytic anemia  D53.9 281.9   7. Hyperglycemia  R73.9 790.29   8. Thrombocytopenia  D69.6 287.5   9. Laceration of forehead, initial encounter  S01.81XA 873.42   10. Subarachnoid bleed  I60.9 430     Patient Active Problem List   Diagnosis    Subdural hematoma     History reviewed. No pertinent past medical history.  History reviewed. No pertinent surgical history.   General Information       Row Name 24 1006          Physical Therapy Time and Intention    Document Type evaluation  -DJ     Mode of Treatment co-treatment;physical therapy;occupational therapy  cotreat appropriate due to ? assist required  -DJ       Row Name 24 1006          General Information    Patient Profile Reviewed yes  -DJ     Prior Level of Function independent:;ADL's  -DJ     Existing Precautions/Restrictions fall  -DJ     Barriers to Rehab medically complex;cognitive status  -DJ       Row Name 24 1006          Living Environment    People in Home friend(s)  -DJ       Row Name 24 1006          Home Main Entrance    Number of Stairs, Main Entrance other (see comments)  pt slow to respond to ?s  -DJ       Row Name 24 1006          Stairs Within Home, Primary    Stairs, Within Home, Primary unknown  -DJ       Row Name 24 1006          Cognition    Orientation Status (Cognition) oriented to;person;place  -DJ       Row Name 24 1006          Safety Issues, Functional Mobility    Safety Issues Affecting Function (Mobility) judgment;insight into deficits/self-awareness;safety precaution awareness  -DJ     Impairments Affecting Function (Mobility)  balance;strength;endurance/activity tolerance  -DJ     Comment, Safety Issues/Impairments (Mobility) gt belt, nonskid socks  -DJ               User Key  (r) = Recorded By, (t) = Taken By, (c) = Cosigned By      Initials Name Provider Type    Saida Marie, PT Physical Therapist                   Mobility       Row Name 02/23/24 1009          Bed Mobility    Bed Mobility supine-sit;sit-supine  -DJ     Supine-Sit [place] (Bed Mobility) minimum assist (75% patient effort);moderate assist (50% patient effort);2 person assist;verbal cues  -DJ     Sit-Supine [place] (Bed Mobility) moderate assist (50% patient effort);2 person assist;verbal cues  -DJ     Assistive Device (Bed Mobility) bed rails;head of bed elevated  -DJ     Comment, (Bed Mobility) dependent to reposition in bed  -DJ       Row Name 02/23/24 1009          Transfers    Comment, (Transfers) unable to attempt transfers due to hr 158 with sitting  -DJ       Row Name 02/23/24 1009          Bed-Chair Transfer    Bed-Chair [place] (Transfers) unable to assess  -DJ       Row Name 02/23/24 1009          Sit-Stand Transfer    Sit-Stand [place] (Transfers) unable to assess  -DJ       Row Name 02/23/24 1009          Gait/Stairs (Locomotion)    [place] Level (Gait) unable to assess  -DJ     [place] Level (Stairs) not tested  -DJ               User Key  (r) = Recorded By, (t) = Taken By, (c) = Cosigned By      Initials Name Provider Type    Saida Marie, PT Physical Therapist                   Obj/Interventions       Row Name 02/23/24 1011          Range of Motion Comprehensive    Comment, General Range of Motion grossly WFL  -DJ       Row Name 02/23/24 1011          Strength Comprehensive (MMT)    Comment, General Manual Muscle Testing (MMT) Assessment moves all 4s, generally weak  -DJ       Row Name 02/23/24 1011          Motor Skills    Motor Skills functional endurance  -DJ     Functional Endurance poor - increase heart rate with  activity  -DJ       Row Name 02/23/24 1011          Balance    Balance Assessment sitting static balance;sitting dynamic balance  -DJ     Static Sitting Balance standby assist;verbal cues  -DJ     Dynamic Sitting Balance contact guard;verbal cues  -DJ     Position, Sitting Balance unsupported;sitting edge of bed  Pt sat 5+ min EOB  -DJ     Balance Interventions sitting;static;dynamic  -DJ       Row Name 02/23/24 1011          Sensory Assessment (Somatosensory)    Sensory Assessment (Somatosensory) not tested  -DJ               User Key  (r) = Recorded By, (t) = Taken By, (c) = Cosigned By      Initials Name Provider Type    Saida Marie, PT Physical Therapist                   Goals/Plan       Row Name 02/23/24 1023          Bed Mobility Goal 1 (PT)    Activity/Assistive Device (Bed Mobility Goal 1, PT) sit to supine;supine to sit  -DJ     [place] Level/Cues Needed (Bed Mobility Goal 1, PT) standby assist;verbal cues required  -DJ     Time Frame (Bed Mobility Goal 1, PT) 10 days  -DJ       Row Name 02/23/24 1023          Transfer Goal 1 (PT)    Activity/Assistive Device (Transfer Goal 1, PT) sit-to-stand/stand-to-sit  -DJ     [place] Level/Cues Needed (Transfer Goal 1, PT) verbal cues required;standby assist  -DJ     Time Frame (Transfer Goal 1, PT) 10 days  -DJ       Row Name 02/23/24 1023          Gait Training Goal 1 (PT)    Activity/Assistive Device (Gait Training Goal 1, PT) gait (walking locomotion);improve balance and speed;increase endurance/gait distance;increase energy conservation;walker, rolling  -DJ     [place] Level (Gait Training Goal 1, PT) contact guard required  -DJ     Distance (Gait Training Goal 1, PT) >300' with least restrictive AD  -DJ     Time Frame (Gait Training Goal 1, PT) 10 days  -DJ       Row Name 02/23/24 1023          Patient Education Goal (PT)    Activity (Patient Education Goal, PT) HEP  -DJ     [place]/Cues/Accuracy (Memory Goal 2, PT) demonstrates  adequately;verbalizes understanding  -DJ     Time Frame (Patient Education Goal, PT) 5 days;short term goal (STG)  -DJ       Row Name 02/23/24 1023          Therapy Assessment/Plan (PT)    Planned Therapy Interventions (PT) balance training;bed mobility training;gait training;home exercise program;transfer training;strengthening;postural re-education;patient/family education  -DJ               User Key  (r) = Recorded By, (t) = Taken By, (c) = Cosigned By      Initials Name Provider Type    Saida Marie, PT Physical Therapist                   Clinical Impression       Row Name 02/23/24 1012          Pain    Pre/Posttreatment Pain Comment Pt generally without c/os  -DJ       Row Name 02/23/24 1012          Plan of Care Review    Plan of Care Reviewed With patient  -DJ     Outcome Evaluation 28yo thin black male admitted 2/22/24 after syncopal event and sz activity, SDH. Pt collapsed and fell while here. PMH includes etoh abuse. PLOF: Pt lives in apartment with friend. He was independent with ADLs and amb without AD. Pt is slow to respond to questions but is oriented to name and place. He is limited now by generalized weakness and decreased activity tolerance. Today, he was found resting in bed, heart rate 147. He req min A and vc to sit EOB and was slow to move. Pt able to sit EOB unsupported for >5 min. He performed seated LE ther ex with vc. Pt's heart rate increased to 158 and we were unable to attempt transfers or amb. Nurse present. Pt returned supine in bed with min-mod A of 2 and was dependent to reposition in bed. He would benefit from skilled PT services to address functional deficits and prepare for d/c.  -DJ       Row Name 02/23/24 1012          Therapy Assessment/Plan (PT)    Rehab Potential (PT) fair, will monitor progress closely  -DJ     Criteria for Skilled Interventions Met (PT) yes;meets criteria;skilled treatment is necessary  -DJ     Therapy Frequency (PT) 6 times/wk  -DJ       Row Name  02/23/24 1012          Vital Signs    Pretreatment Heart Rate (beats/min) 147  supine  -DJ     Intratreatment Heart Rate (beats/min) 158  sitting  -DJ     Pre Patient Position Supine  -DJ     Intra Patient Position Sitting  -DJ     Post Patient Position Supine  -DJ       Row Name 02/23/24 1012          Positioning and Restraints    Pre-Treatment Position in bed  -DJ     Post Treatment Position bed  -DJ     In Bed notified nsg;supine;call light within reach;encouraged to call for assist;exit alarm on;with nsg  -DJ               User Key  (r) = Recorded By, (t) = Taken By, (c) = Cosigned By      Initials Name Provider Type    Saida Marie, PT Physical Therapist                   Outcome Measures       Row Name 02/23/24 1029 02/23/24 0302       How much help from another person do you currently need...    Turning from your back to your side while in flat bed without using bedrails? 3  -DJ 4  -BJ    Moving from lying on back to sitting on the side of a flat bed without bedrails? 3  -DJ 4  -BJ    Moving to and from a bed to a chair (including a wheelchair)? 2  -DJ 4  -BJ    Standing up from a chair using your arms (e.g., wheelchair, bedside chair)? 2  -DJ 4  -BJ    Climbing 3-5 steps with a railing? 2  -DJ 4  -BJ    To walk in hospital room? 2  -DJ 4  -BJ    AM-PAC 6 Clicks Score (PT) 14  -DJ 24  -BJ    Highest Level of Mobility Goal 4 --> Transfer to chair/commode  -DJ 8 --> Walked 250 feet or more  -      Row Name 02/23/24 1029          Functional Assessment    Outcome Measure Options AM-PAC 6 Clicks Basic Mobility (PT)  -DJ               User Key  (r) = Recorded By, (t) = Taken By, (c) = Cosigned By      Initials Name Provider Type    Saida Marie, PT Physical Therapist    Terra Redmond, RN Registered Nurse                                 Physical Therapy Education       Title: PT OT SLP Therapies (In Progress)       Topic: Physical Therapy (In Progress)       Point: Mobility training (Not Started)        Learner Progress:  Not documented in this visit.              Point: Home exercise program (In Progress)       Learning Progress Summary             Patient Acceptance, E, NR by JOVANNI at 2/23/2024 1030                         Point: Body mechanics (In Progress)       Learning Progress Summary             Patient Acceptance, E, NR by JOVANNI at 2/23/2024 1030                         Point: Precautions (In Progress)       Learning Progress Summary             Patient Acceptance, E, NR by JOVANNI at 2/23/2024 1030                                         User Key       Initials Effective Dates Name Provider Type Discipline    JOVANNI 10/25/19 -  Saida Shen, PT Physical Therapist PT                  PT Recommendation and Plan  Planned Therapy Interventions (PT): balance training, bed mobility training, gait training, home exercise program, transfer training, strengthening, postural re-education, patient/family education  Plan of Care Reviewed With: patient  Outcome Evaluation: 30yo thin black male admitted 2/22/24 after syncopal event and sz activity, SDH. Pt collapsed and fell while here. PMH includes etoh abuse. PLOF: Pt lives in apartment with friend. He was independent with ADLs and amb without AD. Pt is slow to respond to questions but is oriented to name and place. He is limited now by generalized weakness and decreased activity tolerance. Today, he was found resting in bed, heart rate 147. He req min A and vc to sit EOB and was slow to move. Pt able to sit EOB unsupported for >5 min. He performed seated LE ther ex with vc. Pt's heart rate increased to 158 and we were unable to attempt transfers or amb. Nurse present. Pt returned supine in bed with min-mod A of 2 and was dependent to reposition in bed. He would benefit from skilled PT services to address functional deficits and prepare for d/c.     Time Calculation:   PT Evaluation Complexity  History, PT Evaluation Complexity: 3 or more personal factors and/or  comorbidities  Clinical Presentation (PT Evaluation Complexity): evolving  Clinical Decision Making (PT Evaluation Complexity): moderate complexity     PT Charges       Row Name 02/23/24 1031             Time Calculation    Start Time 0902  -DJ      Stop Time 0916  -DJ      Time Calculation (min) 14 min  -DJ      PT Non-Billable Time (min) 15 min  -DJ      PT Received On 02/23/24  -DJ      PT - Next Appointment 02/24/24  -DJ      PT Goal Re-Cert Due Date 03/04/24  -DJ                User Key  (r) = Recorded By, (t) = Taken By, (c) = Cosigned By      Initials Name Provider Type    Saida Marie, PT Physical Therapist                  Therapy Charges for Today       Code Description Service Date Service Provider Modifiers Qty    10571254148 HC PT EVAL HIGH COMPLEXITY 2 2/23/2024 Saida Shen, PT GP 1    04041389252 HC PT THERAPEUTIC ACT EA 15 MIN 2/23/2024 Saida Shen, PT GP 1            PT G-Codes  Outcome Measure Options: AM-PAC 6 Clicks Basic Mobility (PT)  AM-PAC 6 Clicks Score (PT): 14  PT Discharge Summary  Anticipated Discharge Disposition (PT): home with assist, home with home health, skilled nursing facility (pending progress)    Saida Shen PT  2/23/2024

## 2024-02-23 NOTE — PAYOR COMM NOTE
"Nida Peck (29 y.o. Male)                           ATTENTION - INPATIENT AUTHORIZATION REQUEST - ICD 10 - I60.9 - R56,9                            FACILITY NPI - [id number] [facility], [address] [address]                           PHYSICIAN NPI -   [id number] DR. BALTAZAR, [address] [address]                             REPLY TO UR DEPT  [phone] OR CALL   FREDERIC SIMON LPKAMERON           Date of Birth   1995    Social Security Number       Address   [address] [address]    Home Phone   [phone]    MRN   [id number]       Cheondoism   [place]    Marital Status   Single                            Admission Date   2/22/24    Admission Type   Emergency    Admitting Provider   Agus Baltazar MD    Attending Provider   Agus Baltazar MD    Department, Room/Bed   [facility] INTENSIVE CARE, I384/1       Discharge Date       Discharge Disposition       Discharge Destination                                 Attending Provider: Agus Baltazar MD    Allergies: Penicillins, Sulfa Antibiotics    Isolation: None   Infection: None   Code Status: CPR    Ht: 185.4 cm (73\")   Wt: 65.7 kg (144 lb 13.5 oz)    Admission Cmt: None   Principal Problem: Subdural hematoma [S06.5XAA]                   Active Insurance as of 2/22/2024       Primary Coverage       Payor Plan Insurance Group Employer/Plan Group    [facility] [employer] [facility] BY [employer]        Payor Plan Address Payor Plan Phone Number Payor Plan Fax Number Effective Dates    [address] [address]   2/22/2024 - None Entered    [facility] [id number]         Subscriber Name Subscriber Birth Date Member ID       NIDA PECK 1995 [id number]                     Emergency Contacts        (Rel.) Home Phone Work Phone Mobile Phone    CHINMAY BRASHER (Friend) -- -- [phone]    WagonerMargoth (Mother) -- -- [phone]                 History & Physical        Bills, Rocio " LAYTON, APRN at 02/23/24 0324       Attestation signed by Agus Baltazar MD at 02/23/24 0816 (Updated)    I, Agus Baltazar MD, have reviewed the history and plan with our APRN. I have independently examined the patient and I personally performed >50% of the management in this split shared patient. My physical exam confirms her documented findings and I agree with the plan as listed, with the following additions/modifications:    Young male patient with unfortunately plethora of medical conditions.  Apparently he developed a seizure and fell, striking his head.  Check neurologic status every hour in the ICU.  Repeat CT scan of his head in 6 hours.  Has subdural hematoma with subarachnoid hemorrhage and midline shift and brain swelling.  Neurosurgery has been consulted.  Maintain systolic blood pressure below 140.  Start Keppra IV but also Ativan protocol per CIWA scoring.  Patient is a heavy drinker and could develop alcohol withdrawal seizures.  Insert feeding tube and start trickle feeding.  Labs suggest alcoholic ketoacidosis.  Apparently patient has been having diarrhea.  Give IV fluids, thiamine and folic acid.  Monitor liver enzymes daily and this alcoholic patient.  Platelets are low, likely due to alcohol abuse.  Replace phosphorus, magnesium and potassium IV per protocol.  Obtain cardiology consultation and repeat EKG.  Patient is having atrial fibrillation, likely holiday heart, paroxysmal.    Total critical care time 37 minutes                  Group: [facility]         History and Physical    Patient Identification:  Nida Peck  29 y.o.  male  1995  [id number]            Requesting physician: Dr Mckeon    Reason for Consultation: ICU admission    CC: Seizure, SDH, SAH, Alcohol withdrawal    History of Present Illness:  Nida Peck is a 29 year old male with past medical history of alcohol dependence with withdrawal, alcoholic ketosis, malnutrition, iron deficiency anemia, alcoholic  "hepatitis, thrombocytopenia, tobacco abuse, Sandoval-Alvaro syndrome with Bactrim, seizures, anxiety and depression, GERD, esophagitis who presented to the ED for possible seizure at work.  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  Reports feeling of his heart racing, and has been having nausea vomiting diarrhea x 2 days with no abdominal pain.  He received IV fluids, CT head was negative, and was being discharged home with a referral to neurology with seizure precautions when he collapsed in the ED hallway and was found with seizure activity.  He hit is head and a laceration was found above right eye.  CT head revealed left subdural hematoma, midline shift of 6mm as well as trace left subarachnoid, parafalcine and tentorial hemorrhage.  He was given 1 g IV Keppra, 2 g mag sulfate, potassium chloride, tetanus injection and his eyebrow was sutured in the ED.  Neurosurgery consulted and he was admitted to ICU for further management.      Per chart review, in 2021 patient was drinking half pint of liquor every 2 day, and then 2022 was drinking 1 pint daily and attempted rehab stay.  He has history of prior seizure from alcohol withdrawal and known liver dysfunction with thrombocytopenia.  Has been having n/v/diarrhea but no abdominal pain and denies blood in stool.  Per chart review patient states he becomes tremorous and tachycardic when he is not drinking.  Upon my exam pt somewhat uncooperative with interview but stated he has not had a drink for maybe a couple days.  He states has \"3\" drinks of liquor every day, whatever he can find at home, assuming possibly 3 pints.  He smokes approximately 3 cigarettes/day.  He is not currently taking any medications.  He lives in a hotel.    Review of Systems  Unable, pt uncooperative upon exam    Past Medical History:  History reviewed. No " "pertinent past medical history.    Past Surgical History:  History reviewed. No pertinent surgical history.     Home Meds:  No medications prior to admission.       Allergies:  Allergies   Allergen Reactions    Penicillins Unknown - Low Severity    Sulfa Antibiotics Unknown - Low Severity       Social History:   Social History     Socioeconomic History    Marital status: Single       Family History:  History reviewed. No pertinent family history.    Physical Exam:  /95   Pulse (!) 141   Temp 99.2 °F (37.3 °C)   Resp 18   Ht 185.4 cm (73\")   Wt 65.7 kg (144 lb 13.5 oz)   SpO2 96%   BMI 19.11 kg/m²  Body mass index is 19.11 kg/m². 96% 65.7 kg (144 lb 13.5 oz)    Physical Exam  Constitutional: Young male patient lying in bed, No acute respiratory distress, no accessory muscle use  Head: NCAT  Eyes: No pallor, icteric conjunctiva, EOMI. Pupils 4 bilaterally, brisk round and equal.  R eyebrow with sutures.  Pt unable to open right eye at this time r/t swelling.  ENMT:  No oral thrush. Moist MM.   NECK: Trachea midline, no thyromegaly.   Heart: Regular rhythm, tachycardic. no murmur. No pedal edema .  Lungs: ODELL +, clear to auscultation throughout, No wheezes/ crackles heard    Abdomen: Soft. No tenderness, guarding or rigidity. No palpable masses.  Extremities: Extremities warm and well perfused. No cyanosis/ clubbing. All pulses palpable.  Neuro: Responds to voice, voice is soft. Follows commands, moves all extremities equally, no gross focal neuro deficits. Pt just received ativan per [facility] protocol  Psych: Seems sad, uncooperative    PPE recommended per [facility] [place] infectious disease Isolation protocol for the current clinical scenario(as mentioned below) was followed.    LABS:  SARS-CoV-2, REYMUNDO   Date Value Ref Range Status   03/29/2023 NEGATIVE Negative Final     Comment:     The 2019-CoV rRT-PCR Assay is only for use under a Food and Drug Administration Emergency Use Authorization. The performance " characteristics of the assay were verified by the Clinical Microbiology Laboratory at the [facility].   Results should be used in conjunction with the patient's clinical symptoms, medical history, and other clinical/laboratory findings to determine an overall clinical diagnosis. Negative results do not preclude infection with SARS-CoV-2 (COVID-19).    Test parameters have not been validated for screening asymptomatic patients.       Lab Results   Component Value Date    CALCIUM 7.9 (L) 02/22/2024    PHOS 1.5 (C) 02/22/2024     Results from last 7 days   Lab Units 02/23/24  0300 02/22/24 2217 02/22/24 2013   MAGNESIUM mg/dL  --  1.8 1.2*   SODIUM mmol/L  --  135* 137   POTASSIUM mmol/L  --  3.2* 3.2*   CHLORIDE mmol/L  --  96* 90*   CO2 mmol/L  --  19.8* 14.9*   BUN mg/dL  --  6 6   CREATININE mg/dL  --  0.70* 0.88   GLUCOSE mg/dL  --  65 125*   CALCIUM mg/dL  --  7.9* 8.5*   WBC 10*3/mm3 5.13  --  4.98   HEMOGLOBIN g/dL 11.5*  --  12.7*   PLATELETS 10*3/mm3 81*  --  96*   ALT (SGPT) U/L  --   --  78*   AST (SGOT) U/L  --   --  224*     Lab Results   Component Value Date    CKTOTAL 163 02/22/2024    TROPONINI <0.010 02/18/2022     Results from last 7 days   Lab Units 02/22/24 2217   CK TOTAL U/L 163                             Lab Results   Component Value Date    TSH 0.745 10/22/2021     Estimated Creatinine Clearance: 142.1 mL/min (A) (by C-G formula based on SCr of 0.7 mg/dL (L)).  Results from last 7 days   Lab Units 02/22/24  2345   NITRITE UA  Negative   WBC UA /HPF Too Numerous to Count*   BACTERIA UA /HPF None Seen   SQUAM EPITHEL UA /HPF 7-12*        Imaging: I personally visualized the images of scans/x-rays performed within last 3 days.      Assessment:  Alcohol dependence and withdrawal  Seizure activity  Subdural hematoma  Subarachnoid hemorrhage  High anion gap metabolic acidosis  Alcoholic ketoacidosis  Thrombocytopenia  Elevated liver enzymes  Nausea and  vomiting  Diarrhea  Macrocytic anemia  Electrolyte disturbance  Hyperglycemia  Atrial fib RVR    Recommendations:   Alcohol dependence and withdrawal  -Start CIWA Protocol with scheduled Ativan  -IV folic acid, thiamine.  Will check levels in a.m.    2.  Seizure activity  -Secondary to #1  -Received 1 g Keppra IV at 0110, defer to Neurosurgery/Neurology further management  -Neurology consult  -Seizure precautions    3.  Subdural hematoma/Subarachnoid hemorrhage  -Neurosurgery managing  -Neurochecks every hour  -NIH every shift  -Repeat CT head in AM    4.  High anion gap metabolic acidosis  -Anion gap improved from earlier after IV fluid administration, serum bicarbonate mildly decreased  -Likely due to alcoholic ketoacidosis, diarrhea  -Will give another liter bolus as pt is also increasingly tachycardic, continuous IVF  -Check serum osmol, BHOB  -Urine with 3+ ketones    5.  Thrombocytopenia/Macrocytic anemia  -Alcohol induced, platelets 222 in January 2023, now 81.  Continue to monitor closely, recheck AM labs  -Hgb stable, will check occult blood stool    6.  Elevated liver enzymes/Nausea, vomiting, diarrhea  - Liver enzymes have trended up on repeat labs  -Will get CT abdomen pelvis   -Ammonia normal  -Protonix for stress ulcer prophy  -GI consult    7.  Electrolyte disturbance  -Hypokalemia, hypophosphatemia,   -Renal function normal, will start electrolyte replacement protocol    8.  Atrial fib RVR  -Pt had episode of afib RVR, paroxysmal. If continues in afib will consult cardiology    SCDs    Patient was placed in face mask upon entering room and kept mask on throughout our encounter. I wore full protective equipment throughout this patient encounter including a face mask, gown and gloves. Hand hygiene was performed before donning protective equipment and after removal when leaving the room.    Rocio Pitts, APRN  2/23/2024  03:25 EST      Much of this encounter note is an electronic  transcription/translation of spoken language to printed text using Dragon Software.     Electronically signed by Agus Baltazar MD at 02/23/24 0657          Emergency Department Notes        Paula Burns APRN at 02/23/24 0206        Procedure Orders    1. Laceration Repair [[id number]] ordered by Paula Burns APRN              Attestation signed by Gabriel Mckeon MD at 02/23/24 0533        SHARED APC FACE TO FACE: I performed a substantive part of the MDM during the patient's E/M visit. I personally evaluated and examined the patient. I personally made or approved the documented management plan and acknowledge its risk of complications.   Gabriel SARAVIA MD 2/23/2024 05:33 EST                         Laceration Repair    Date/Time: 2/23/2024 2:06 AM    Performed by: Paula Burns APRN  Authorized by: Gabriel Mckeon MD    Consent:     Consent obtained:  Verbal    Consent given by:  Patient    Risks, benefits, and alternatives were discussed: yes      Risks discussed:  Infection, pain, retained foreign body, need for additional repair, poor cosmetic result, tendon damage, vascular damage, poor wound healing and nerve damage    Alternatives discussed:  No treatment, delayed treatment, observation and referral  Universal protocol:     Procedure explained and questions answered to patient or proxy's satisfaction: yes      Patient identity confirmed:  Verbally with patient  Anesthesia:     Anesthesia method:  Local infiltration    Local anesthetic:  Lidocaine 1% WITH epi  Laceration details:     Location:  Face    Face location:  L eyebrow    Length (cm):  15  Pre-procedure details:     Preparation:  Patient was prepped and draped in usual sterile fashion and imaging obtained to evaluate for foreign bodies  Exploration:     Hemostasis achieved with:  Epinephrine and direct pressure    Imaging obtained comment:  Ct    Imaging outcome: foreign body not noted      Wound exploration: wound  explored through full range of motion and entire depth of wound visualized      Wound extent: no areolar tissue violation noted, no fascia violation noted, no foreign bodies/material noted, no muscle damage noted, no nerve damage noted, no tendon damage noted, no underlying fracture noted and no vascular damage noted    Treatment:     Area cleansed with:  Povidone-iodine    Amount of cleaning:  Extensive    Irrigation solution:  Sterile saline    Irrigation volume:  1000ml    Irrigation method:  Syringe    Debridement:  None    Undermining:  None  Skin repair:     Repair method:  Sutures    Suture size:  5-0    Suture material:  Prolene    Suture technique:  Simple interrupted    Number of sutures:  13  Approximation:     Approximation:  Close  Repair type:     Repair type:  Intermediate  Post-procedure details:     Dressing:  Antibiotic ointment    Procedure completion:  Tolerated         Paula Burns APRN  02/23/24 0209      Electronically signed by Gabriel Mckeon MD at 02/23/24 0552       Shaw Oneal RN at 02/23/24 0142          ..Nursing report ED to floor  Nida Peck  29 y.o.  male    HPI :   HPI (Adult)  Stated Reason for Visit: Pt arrived via [facility] from work. 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 Per witness, pt was unconscious for about 3-5 minutes. Pt denies any similar history. Pt A&O x4 during triage exam but states he feels lightheaded.    Chief Complaint  Chief Complaint   Patient presents with    Seizures       Admitting doctor:   Agus Baltazar MD    Admitting diagnosis:   The primary encounter diagnosis was Subdural hematoma. Diagnoses of Seizure-like activity, Hypomagnesemia, Hypokalemia, Elevated liver enzymes, Macrocytic anemia, Hyperglycemia, Thrombocytopenia, Laceration of forehead, initial encounter, and Subarachnoid bleed were also pertinent to  this visit.    Code status:   Current Code Status       Date Active Code Status Order ID Comments User Context       2/23/2024 0123 CPR (Attempt to Resuscitate) [id number]  Rocio Pitts APRN ED        Question Answer    Code Status (Patient has no pulse and is not breathing) CPR (Attempt to Resuscitate)    Medical Interventions (Patient has pulse or is breathing) Full Support                    Allergies:   Penicillins and Sulfa antibiotics    Isolation:   No active isolations    Intake and Output    Intake/Output Summary (Last 24 hours) at 2/23/2024 0142  Last data filed at 2/22/2024 2312  Gross per 24 hour   Intake 1050 ml   Output --   Net 1050 ml       Weight:       02/22/24 1954   Weight: 74.8 kg (165 lb)       Most recent vitals:   Vitals:    02/23/24 0003 02/23/24 0023 02/23/24 0033 02/23/24 0103   BP: (!) 140/102 121/89 123/83 102/71   Pulse: (!) 142 (!) 134 (!) 130 (!) 141   Resp:       Temp:       TempSrc:       SpO2: 97% 96% 96% 97%   Weight:       Height:           Active LDAs/IV Access:   Lines, Drains & Airways       Active LDAs       Name Placement date Placement time Site Days    Peripheral IV 02/22/24 2344 Anterior;Right Forearm 02/22/24 2344  Forearm  less than 1                    Labs (abnormal labs have a star):   Labs Reviewed   COMPREHENSIVE METABOLIC PANEL - Abnormal; Notable for the following components:       Result Value    Glucose 125 (*)     Potassium 3.2 (*)     Chloride 90 (*)     CO2 14.9 (*)     Calcium 8.5 (*)     ALT (SGPT) 78 (*)     AST (SGOT) 224 (*)     Alkaline Phosphatase 244 (*)     BUN/Creatinine Ratio 6.8 (*)     Anion Gap 32.1 (*)     All other components within normal limits    Narrative:     GFR Normal >60  Chronic Kidney Disease <60  Kidney Failure <15     URINALYSIS W/ MICROSCOPIC IF INDICATED (NO CULTURE) - Abnormal; Notable for the following components:    Color, UA Dark Yellow (*)     Appearance, UA Cloudy (*)     Ketones, UA 80 mg/dL (3+) (*)     Protein, UA  100 mg/dL (2+) (*)     Leuk Esterase, UA Small (1+) (*)     All other components within normal limits   MAGNESIUM - Abnormal; Notable for the following components:    Magnesium 1.2 (*)     All other components within normal limits   CBC WITH AUTO DIFFERENTIAL - Abnormal; Notable for the following components:    RBC 3.75 (*)     Hemoglobin 12.7 (*)     Hematocrit 36.6 (*)     MCV 97.6 (*)     MCH 33.9 (*)     Platelets 96 (*)     All other components within normal limits   BASIC METABOLIC PANEL - Abnormal; Notable for the following components:    Creatinine 0.70 (*)     Sodium 135 (*)     Potassium 3.2 (*)     Chloride 96 (*)     CO2 19.8 (*)     Calcium 7.9 (*)     Anion Gap 19.2 (*)     All other components within normal limits    Narrative:     GFR Normal >60  Chronic Kidney Disease <60  Kidney Failure <15     URINALYSIS, MICROSCOPIC ONLY - Abnormal; Notable for the following components:    WBC, UA Too Numerous to Count (*)     Squamous Epithelial Cells, UA 7-12 (*)     Trichomonas, UA Small/1+ (*)     All other components within normal limits   URINE DRUG SCREEN - Normal    Narrative:     Negative Thresholds Per Drugs Screened:    Amphetamines                 500 ng/ml  Barbiturates                 200 ng/ml  Benzodiazepines              100 ng/ml  Cocaine                      300 ng/ml  Methadone                    300 ng/ml  Opiates                      300 ng/ml  Oxycodone                    100 ng/ml  THC                           50 ng/ml  Fentanyl                       5 ng/ml      The Normal Value for all drugs tested is negative. This report includes final unconfirmed screening results to be used for medical treatment purposes only. Unconfirmed results must not be used for non-medical purposes such as employment or legal testing. Clinical consideration should be applied to any drug of abuse test, particularly when unconfirmed results are used.           MAGNESIUM - Normal   ETHANOL   PROTIME-INR   APTT    HEMOGLOBIN A1C   LIPID PANEL   CBC (NO DIFF)   COMPREHENSIVE METABOLIC PANEL   PHOSPHORUS   AMMONIA   CK   POCT GLUCOSE FINGERSTICK   POCT GLUCOSE FINGERSTICK   POCT GLUCOSE FINGERSTICK   POCT GLUCOSE FINGERSTICK   POCT GLUCOSE FINGERSTICK   POCT GLUCOSE FINGERSTICK   POCT GLUCOSE FINGERSTICK   POCT GLUCOSE FINGERSTICK   CBC AND DIFFERENTIAL    Narrative:     The following orders were created for panel order CBC & Differential.  Procedure                               Abnormality         Status                     ---------                               -----------         ------                     CBC Auto Differential[[id number]]        Abnormal            Final result                 Please view results for these tests on the individual orders.       EKG:   ECG 12 Lead Syncope   Preliminary Result   HEART RATE= 99  bpm   RR Interval= 606  ms   PA Interval= 133  ms   P Horizontal Axis= 39  deg   P Front Axis= 88  deg   QRSD Interval= 92  ms   QT Interval= 351  ms   QTcB= 451  ms   QRS Axis= 87  deg   T Wave Axis= 77  deg   - NORMAL ECG -   Sinus rhythm   ST elev, probable normal early repol pattern   Electronically Signed By:    Date and Time of Study: 2024-02-22 20:47:11          Meds given in ED:   Medications   sodium chloride 0.9 % flush 10 mL (has no administration in time range)   sodium chloride 0.9 % flush 10 mL (has no administration in time range)   sodium chloride 0.9 % infusion 40 mL (has no administration in time range)   acetaminophen (TYLENOL) tablet 650 mg (has no administration in time range)     Or   acetaminophen (TYLENOL) suppository 650 mg (has no administration in time range)   niCARdipine (CARDENE) 25 mg in 250 mL NS infusion kit (has no administration in time range)   ondansetron (ZOFRAN) injection 4 mg (has no administration in time range)   bisacodyl (DULCOLAX) suppository 10 mg (has no administration in time range)   aluminum-magnesium hydroxide-simethicone (MAALOX MAX) 400-400-40  MG/5ML suspension 7.5 mL (has no administration in time range)   docusate sodium (COLACE) capsule 100 mg (has no administration in time range)   sodium chloride 0.9 % infusion (has no administration in time range)   Potassium Replacement - Follow Nurse / BPA Driven Protocol (has no administration in time range)   Magnesium Standard Dose Replacement - Follow Nurse / BPA Driven Protocol (has no administration in time range)   Phosphorus Replacement - Follow Nurse / BPA Driven Protocol (has no administration in time range)   Calcium Replacement - Follow Nurse / BPA Driven Protocol (has no administration in time range)   cloNIDine (CATAPRES) tablet 0.1 mg (has no administration in time range)   thiamine (B-1) injection 200 mg (has no administration in time range)     Followed by   thiamine (VITAMIN B-1) tablet 100 mg (has no administration in time range)   folic acid 1 mg in sodium chloride 0.9 % 50 mL IVPB (has no administration in time range)   LORazepam (ATIVAN) injection 2 mg (has no administration in time range)     Followed by   LORazepam (ATIVAN) injection 1 mg (has no administration in time range)   LORazepam (ATIVAN) tablet 1 mg (has no administration in time range)     Or   LORazepam (ATIVAN) injection 1 mg (has no administration in time range)     Or   LORazepam (ATIVAN) tablet 2 mg (has no administration in time range)     Or   LORazepam (ATIVAN) injection 2 mg (has no administration in time range)     Or   LORazepam (ATIVAN) injection 2 mg (has no administration in time range)     Or   LORazepam (ATIVAN) injection 2 mg (has no administration in time range)     Or   LORazepam (ATIVAN) tablet 4 mg (has no administration in time range)     Or   LORazepam (ATIVAN) injection 4 mg (has no administration in time range)   sodium chloride 0.9 % bolus 1,000 mL (0 mL Intravenous Stopped 2/22/24 2117)   magnesium sulfate 2g/50 mL (PREMIX) infusion (0 g Intravenous Stopped 2/22/24 2312)   potassium chloride  (K-DUR,KLOR-CON) ER tablet 40 mEq (40 mEq Oral Given 2/22/24 2118)   magnesium oxide (MAG-OX) tablet 800 mg (800 mg Oral Given 2/22/24 2118)   Tetanus-Diphth-Acell Pertussis (BOOSTRIX) injection 0.5 mL (0.5 mL Intramuscular Given 2/23/24 0027)   lidocaine 1% - EPINEPHrine 1:[id number] (XYLOCAINE W/EPI) 1 %-1:121228 injection 10 mL (10 mL Injection Given 2/23/24 0124)   ondansetron (ZOFRAN) injection 4 mg (4 mg Intravenous Given 2/23/24 0017)   levETIRAcetam (KEPPRA) injection 1,000 mg (1,000 mg Intravenous Given 2/23/24 0110)       Imaging results:  CT Head Without Contrast    Result Date: 2/23/2024   1. Left cerebral convexity subdural hematoma with a maximum thickness of 1.1 cm. Midline shift of 6 mm. There is also trace subarachnoid hemorrhage, as well as trace left parafalcine and left tentorial hemorrhage. 2. No acute fracture or subluxation of the cervical spine is seen.  FINDINGS were discussed with Dr. Mckeon at 12:52 a.m.    Radiation dose reduction techniques were utilized, including automated exposure control and exposure modulation based on body size.   This report was finalized on 2/23/2024 12:55 AM by Dr. Alexa Ferrer M.D on Workstation: [id number]      CT Cervical Spine Without Contrast    Result Date: 2/23/2024   1. Left cerebral convexity subdural hematoma with a maximum thickness of 1.1 cm. Midline shift of 6 mm. There is also trace subarachnoid hemorrhage, as well as trace left parafalcine and left tentorial hemorrhage. 2. No acute fracture or subluxation of the cervical spine is seen.  FINDINGS were discussed with Dr. Mckeon at 12:52 a.m.    Radiation dose reduction techniques were utilized, including automated exposure control and exposure modulation based on body size.   This report was finalized on 2/23/2024 12:55 AM by Dr. Alexa Ferrer M.D on Workstation: [id number]      CT Head Without Contrast    Result Date: 2/22/2024   No acute intracranial hemorrhage or hydrocephalus. Cyst  of the septum pellucidum.  For further evaluation of the cause of patient's seizure, if indicated, enhanced MRI is advised.  This report was finalized on 2/22/2024 8:41 PM by Dr. Albert Moran M.D on Workstation: [id number]       Ambulatory status:   - with assist    Social issues:   Social History     Socioeconomic History    Marital status: Single       NIH Stroke Scale:       Shaw Oneal RN  02/23/24 01:42 EST          Electronically signed by Shaw Oneal RN at 02/23/24 0142       Emely Bagley RN at 02/22/24 3478          This RN was in room three, pt collapsed outside door of room 3. This RN responded and noticed pt was unresponsive and appeared to be having a seizure. Pt was bleeding from what appeared to be a head wound above the eye. Stabilized head and rotated pt to his side with help from APRN. Staff assist was called and others responded to event.     Electronically signed by Emely Bagley RN at 02/22/24 7937       Bernardo Zacarias MD at 02/22/24 2002           EMERGENCY DEPARTMENT ENCOUNTER  Room Number:  13/13  Date of encounter:  2/22/2024  PCP: Provider, No Known  Patient Care Team:  Provider, No Known as PCP - General     HPI:  Context: Nida Peck is a 29 y.o. male who presents to the ED c/o chief complaint of seizure.  Patient reports he does not remember the event, he was at work when the next thing he knows he woke up and EMS was there and he was on a stretcher.  Per report, patient had seizure, was unconscious for several minutes.  Patient reports that he did bite his lip, denies any injury to his tongue, no incontinence.  Patient reports that he was confused for extended period afterwards.  Patient denied any chest pain or shortness of breath, does report he feels his heart racing, denies any irregularity.  Patient reports he was having nausea vomiting and diarrhea for the last 2 days, no abdominal pain, no fevers.  Patient reports that he drank some alcohol last night, no more recent  alcohol, no illicit substances.  Patient reports that he believes that he had a seizure several years ago, is unsure if he was evaluated by Dr.  Patient denies any prior neurology evaluations, has never been on antiepileptics.    MEDICAL HISTORY REVIEW  Reviewed in [facility]    PAST MEDICAL HISTORY  Active Ambulatory Problems     Diagnosis Date Noted    No Active Ambulatory Problems     Resolved Ambulatory Problems     Diagnosis Date Noted    No Resolved Ambulatory Problems     No Additional Past Medical History       PAST SURGICAL HISTORY  History reviewed. No pertinent surgical history.    FAMILY HISTORY  History reviewed. No pertinent family history.    SOCIAL HISTORY  Social History     Socioeconomic History    Marital status: Single       ALLERGIES  Penicillins and Sulfa antibiotics    The patient's allergies have been reviewed    REVIEW OF SYSTEMS  All systems reviewed and negative except for those discussed in HPI.     PHYSICAL EXAM  I have reviewed the triage vital signs and nursing notes.  ED Triage Vitals [02/22/24 1954]   Temp Heart Rate Resp BP SpO2   98.7 °F (37.1 °C) (!) 126 16 147/97 97 %      Temp src Heart Rate Source Patient Position BP Location FiO2 (%)   Oral Monitor Lying Right arm --       General: No acute distress.  HENT: NCAT, PERRL, Nares patent.  Contusion to the lower lip, no laceration.  Eyes: no scleral icterus.  Neck: trachea midline, no ROM limitations.  CV: regular rhythm, regular rate.  Respiratory: normal effort, CTAB.  Abdomen: soft, nondistended, NTTP, no rebound tenderness, no guarding or rigidity.  Musculoskeletal: no deformity.  Neuro: alert, moves all extremities, follows commands.  Skin: warm, dry.    LAB RESULTS  Recent Results (from the past 24 hour(s))   Comprehensive Metabolic Panel    Collection Time: 02/22/24  8:13 PM    Specimen: Blood   Result Value Ref Range    Glucose 125 (H) 65 - 99 mg/dL    BUN 6 6 - 20 mg/dL    Creatinine 0.88 0.76 - 1.27 mg/dL    Sodium 137 136 -  145 mmol/L    Potassium 3.2 (L) 3.5 - 5.2 mmol/L    Chloride 90 (L) 98 - 107 mmol/L    CO2 14.9 (L) 22.0 - 29.0 mmol/L    Calcium 8.5 (L) 8.6 - 10.5 mg/dL    Total Protein 7.6 6.0 - 8.5 g/dL    Albumin 4.7 3.5 - 5.2 g/dL    ALT (SGPT) 78 (H) 1 - 41 U/L    AST (SGOT) 224 (H) 1 - 40 U/L    Alkaline Phosphatase 244 (H) 39 - 117 U/L    Total Bilirubin 0.7 0.0 - 1.2 mg/dL    Globulin 2.9 gm/dL    A/G Ratio 1.6 g/dL    BUN/Creatinine Ratio 6.8 (L) 7.0 - 25.0    Anion Gap 32.1 (H) 5.0 - 15.0 mmol/L    eGFR 117.9 >60.0 mL/min/1.73   Ethanol    Collection Time: 02/22/24  8:13 PM    Specimen: Blood   Result Value Ref Range    Ethanol <10 0 - 10 mg/dL    Ethanol % <0.010 %   Magnesium    Collection Time: 02/22/24  8:13 PM    Specimen: Blood   Result Value Ref Range    Magnesium 1.2 (L) 1.6 - 2.6 mg/dL   CBC Auto Differential    Collection Time: 02/22/24  8:13 PM    Specimen: Blood   Result Value Ref Range    WBC 4.98 3.40 - 10.80 10*3/mm3    RBC 3.75 (L) 4.14 - 5.80 10*6/mm3    Hemoglobin 12.7 (L) 13.0 - 17.7 g/dL    Hematocrit 36.6 (L) 37.5 - 51.0 %    MCV 97.6 (H) 79.0 - 97.0 fL    MCH 33.9 (H) 26.6 - 33.0 pg    MCHC 34.7 31.5 - 35.7 g/dL    RDW 15.2 12.3 - 15.4 %    RDW-SD 53.9 37.0 - 54.0 fl    MPV 10.6 6.0 - 12.0 fL    Platelets 96 (L) 140 - 450 10*3/mm3    Neutrophil % 63.3 42.7 - 76.0 %    Lymphocyte % 23.3 19.6 - 45.3 %    Monocyte % 11.4 5.0 - 12.0 %    Eosinophil % 1.0 0.3 - 6.2 %    Basophil % 0.4 0.0 - 1.5 %    Neutrophils, Absolute 3.15 1.70 - 7.00 10*3/mm3    Lymphocytes, Absolute 1.16 0.70 - 3.10 10*3/mm3    Monocytes, Absolute 0.57 0.10 - 0.90 10*3/mm3    Eosinophils, Absolute 0.05 0.00 - 0.40 10*3/mm3    Basophils, Absolute 0.02 0.00 - 0.20 10*3/mm3   ECG 12 Lead Syncope    Collection Time: 02/22/24  8:47 PM   Result Value Ref Range    QT Interval 351 ms    QTC Interval 451 ms   Basic Metabolic Panel    Collection Time: 02/22/24 10:17 PM    Specimen: Blood   Result Value Ref Range    Glucose 65 65 - 99 mg/dL     BUN 6 6 - 20 mg/dL    Creatinine 0.70 (L) 0.76 - 1.27 mg/dL    Sodium 135 (L) 136 - 145 mmol/L    Potassium 3.2 (L) 3.5 - 5.2 mmol/L    Chloride 96 (L) 98 - 107 mmol/L    CO2 19.8 (L) 22.0 - 29.0 mmol/L    Calcium 7.9 (L) 8.6 - 10.5 mg/dL    BUN/Creatinine Ratio 8.6 7.0 - 25.0    Anion Gap 19.2 (H) 5.0 - 15.0 mmol/L    eGFR 126.3 >60.0 mL/min/1.73       I ordered the above labs and reviewed the results.    RADIOLOGY  XR Chest 1 View    Result Date: 2/22/2024  XR CHEST 1 VW-  HISTORY: 29-year-old male with seizure.  FINDINGS: There is no evidence for pneumonia, effusions, CHF, or pneumothorax.  This report was finalized on 2/22/2024 8:45 PM by Dr. Kimmy Carmona M.D on Workstation: [id number]      CT Head Without Contrast    Result Date: 2/22/2024  CT HEAD WO CONTRAST-  INDICATIONS: Seizure  TECHNIQUE: Radiation dose reduction techniques were utilized, including automated exposure control and exposure modulation based on body size. Noncontrast head CT  COMPARISON: None available  FINDINGS:    No acute intracranial hemorrhage, midline shift or mass effect. No acute territorial infarct is identified.   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 Ventricles, cisterns, cerebral sulci are otherwise unremarkable for patient age.  The visualized paranasal sinuses, orbits, mastoid air cells are unremarkable.        No acute intracranial hemorrhage or hydrocephalus. Cyst of the septum pellucidum.  For further evaluation of the cause of patient's seizure, if indicated, enhanced MRI is advised.  This report was finalized on 2/22/2024 8:41 PM by Dr. Albert Moran M.D on Workstation: [id number]       I ordered the above noted radiological studies. I reviewed the images and results. I agree with the radiologist interpretation.    PROCEDURES  Procedures    MEDICATIONS GIVEN IN ER  Medications   sodium chloride 0.9 % bolus 1,000 mL (0 mL Intravenous Stopped  2/22/24 2117)   magnesium sulfate 2g/50 mL (PREMIX) infusion (2 g Intravenous New Bag 2/22/24 2120)   potassium chloride (K-DUR,KLOR-CON) ER tablet 40 mEq (40 mEq Oral Given 2/22/24 2118)   magnesium oxide (MAG-OX) tablet 800 mg (800 mg Oral Given 2/22/24 2118)       PROGRESS, DATA ANALYSIS, CONSULTS, AND MEDICAL DECISION MAKING  A complete history and physical exam have been performed.  All available laboratory and imaging results have been reviewed by myself prior to disposition.    MDM    After the initial H&P, I discussed pertinent information from history and physical exam with patient/family.  Discussed differential diagnosis.  Discussed plan for ED evaluation/workup/treatment.  All questions answered.  Patient/family is agreeable with plan.  ED Course as of 02/22/24 2256   Thu Feb 22, 2024 2002 My differential diagnosis for seizure includes but is not limited to:   Seizure  Syncope  Transient ischemic attack (particularly in older adults)  Migraine  Panic attack and anxiety  Psychogenic nonepileptic seizure  Transient global amnesia (rare before the age of 50 years)  Narcolepsy with cataplexy  Paroxysmal movement disorders         [JG]   2053 EKG independently viewed and contemporaneously interpreted by ED physician. Time: 8:47 PM.  Rate 99.  Interpretation: Normal sinus rhythm, normal axis, normal QRS, benign early repolarization. [JG]   2104 Hypokalemia, repleted orally, hypomagnesemia, repleted orally as well as IV. [JG]   2105 ALT elevation of liver enzymes, AST elevated to 24, ALT at 78, bilirubin normal.  Patient also has microcytic anemia with hypokalemia and hypomagnesia anemia.  Constellation of labs is concerning for chronic alcohol abuse. [JG]   2106 I reviewed chest x-ray imaging in PACS, no pulmonary infiltrates per my read. [JG]   2106 I reviewed CT head imaging in PACS, no intracranial hemorrhage per my read. [JG]   2106 High anion gap metabolic acidosis, I suspect that this is likely  secondary to lactic acidosis from seizure, patient receiving IV fluids, obtaining repeat BMP. [JG]   2247 Patient reassessed, discussed ED workup and results to present, discussed pending repeat BMP.  Patient does report that he drinks, sometimes daily.  Patient denies any history of alcohol withdrawal or seizures, reports last year he quit drinking for several months and did not have any withdrawal symptoms at that time.  Patient has no questions or concerns at present. [JG]   2253 Anion gap significantly improved. [JG]   2254 Patient reassessed, discussed ED workup and results.  Discussed plan for discharge.  Discussed need for close follow-up with primary care, discussed referral to neurology.  Discussed new onset seizure precautions.  Given resources to help out with alcohol cessation.  Extensive discussion return precautions, discharging. [JG]      ED Course User Index  [JG] Bernardo Zacarias MD       AS OF 22:56 EST VITALS:    BP - 127/86  HR - 102  TEMP - 98.7 °F (37.1 °C) (Oral)  O2 SATS - 99%    DIAGNOSIS  Final diagnoses:   Seizure-like activity   Hypomagnesemia   Hypokalemia   Elevated liver enzymes   Macrocytic anemia   Hyperglycemia   Thrombocytopenia         DISPOSITION  DISCHARGE    Patient discharged in stable condition.    Reviewed implications of results, diagnosis, meds, responsibility to follow up, warning signs and symptoms of possible worsening, potential complications and reasons to return to ER.    Patient/Family voiced understanding of above instructions.    Discussed plan for discharge, as there is no emergent indication for admission. Patient referred to primary care provider for BP management due to today's BP. Pt/family is agreeable and understands need for follow up and repeat testing.  Pt is aware that discharge does not mean that nothing is wrong but it indicates no emergency is present that requires admission and they must continue care with follow-up as given below or physician of  their choice.     FOLLOW-UP  Your PCP    Schedule an appointment as soon as possible for a visit in 2 days  even if well    PATIENT CONNECTION - [place] [id number] [phone]    if you are unable to follow up with your PCP    [facility] NEUROLOG[facility]  [address]  [facility] [address] [phone]  Schedule an appointment as soon as possible for a visit in 2 days      [facility]  [address]  [facility] [address] [phone]  Go in 1 day  for further management of your EtOH abuse         Medication List      No changes were made to your prescriptions during this visit.            Bernardo Zacarias MD  02/22/24 2256      Electronically signed by Bernardo Zacarias MD at 02/22/24 2256       Niki Pak, RN at 02/22/24 1949          Pt arrived via [facility] from work. 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 Per witness, pt was unconscious for about 3-5 minutes. Pt denies any similar history. Pt A&O x4 during triage exam but states he feels lightheaded.     Electronically signed by Niki Pak, RN at 02/22/24 1952       Vital Signs (last day)       Date/Time Temp Temp src Pulse Resp BP Patient Position SpO2    02/23/24 1300 -- -- 74 -- 101/70 -- 96    02/23/24 1200 98.2 (36.8) Oral 77 15 87/70 Lying 98    02/23/24 1100 -- -- 81 -- 111/78 -- 100    02/23/24 1000 -- -- 86 -- 110/85 -- 100    02/23/24 0900 -- -- 151 -- 105/78 -- 98    02/23/24 0800 -- -- 147 16 116/83 Lying 99    02/23/24 0728 98.8 (37.1) Oral 147 -- -- -- 98    02/23/24 0700 -- -- 147 16 109/79 Lying 96    02/23/24 0600 -- -- 149 -- 120/89 -- 99    02/23/24 0515 -- -- 138 -- 126/85 -- 99    02/23/24 0500 -- -- 137 -- 116/89 -- 97    02/23/24 0445 -- -- 135 -- 126/87 -- 99    02/23/24 0430 -- -- 151 -- 136/93 -- 98    02/23/24 0415 99  (37.2) Oral 149 -- 121/87 Lying 96    02/23/24 0400 -- -- 149 -- -- -- 96    02/23/24 0345 -- -- 138 -- 141/108 -- 98    02/23/24 0330 -- -- 138 -- 139/96 -- 97    02/23/24 0315 -- -- 146 -- -- -- 98    02/23/24 0250 99.2 (37.3) -- 141 18 126/95 -- 96    02/23/24 0131 -- -- 140 -- 110/75 -- 97    02/23/24 0103 -- -- 141 -- 102/71 -- 97    02/23/24 0033 -- -- 130 -- 123/83 -- 96    02/23/24 0023 -- -- 134 -- 121/89 -- 96    02/23/24 0003 -- -- 142 -- 140/102 -- 97    02/22/24 2353 -- -- 144 -- 134/92 -- 97    02/22/24 23:40:49 -- -- 141 19 -- -- 97    02/22/24 2301 -- -- 87 16 128/79 -- 97    02/22/24 2101 -- -- 102 -- 127/86 -- 99    02/22/24 2039 -- -- 101 -- -- -- 99    02/22/24 2009 -- -- 115 -- -- -- 96    02/22/24 1954 98.7 (37.1) Oral 126 16 147/97 Lying 97          Oxygen Therapy (last day)       Date/Time SpO2 Device (Oxygen Therapy) Flow (L/min) Oxygen Concentration (%) ETCO2 (mmHg)    02/23/24 1300 96 -- -- -- --    02/23/24 1200 98 room air -- -- --    02/23/24 1100 100 -- -- -- --    02/23/24 1000 100 -- -- -- --    02/23/24 0900 98 -- -- -- --    02/23/24 0800 99 room air -- -- --    02/23/24 0728 98 -- -- -- --    02/23/24 0700 96 room air -- -- --    02/23/24 0600 99 -- -- -- --    02/23/24 0515 99 -- -- -- --    02/23/24 0500 97 -- -- -- --    02/23/24 0445 99 -- -- -- --    02/23/24 0430 98 -- -- -- --    02/23/24 0415 96 room air -- -- --    02/23/24 0400 96 -- -- -- --    02/23/24 0345 98 -- -- -- --    02/23/24 0330 97 -- -- -- --    02/23/24 0315 98 -- -- -- --    02/23/24 0300 -- room air -- -- --    02/23/24 0250 96 room air -- -- --    02/23/24 0131 97 -- -- -- --    02/23/24 0103 97 -- -- -- --    02/23/24 0033 96 -- -- -- --    02/23/24 0023 96 -- -- -- --    02/23/24 0003 97 -- -- -- --    02/22/24 2353 97 -- -- -- --    02/22/24 23:40:49 97 room air -- -- --    02/22/24 2301 97 -- -- -- --    02/22/24 2101 99 -- -- -- --    02/22/24 2039 99 -- -- -- --    02/22/24 2009 96 -- -- -- --     02/22/24 1954 97 room air -- -- --          Lines, Drains & Airways       Active LDAs       Name Placement date Placement time Site Days    Peripheral IV 02/22/24 2344 Anterior;Right Forearm 02/22/24  2344  Forearm  less than 1    Peripheral IV 02/23/24 0330 Anterior;Right;Upper Arm 02/23/24  0330  Arm  less than 1              Inactive LDAs       Name Placement date Placement time Removal date Removal time Site Days    [REMOVED] Peripheral IV 02/22/24 2013 Right Antecubital 02/22/24 2013 02/22/24  2325  Antecubital  less than 1                  CIWA (last day)       Date/Time CIWA-Ar Score    02/23/24 1100 6    02/23/24 0800 8    02/23/24 0300 7    02/23/24 0130 6          Facility-Administered Medications as of 2/23/2024   Medication Dose Route Frequency Provider Last Rate Last Admin    acetaminophen (TYLENOL) tablet 650 mg  650 mg Oral Q4H PRN Rocio Pitts APRN        Or    acetaminophen (TYLENOL) suppository 650 mg  650 mg Rectal Q4H PRN Rocio Pitts APRN        aluminum-magnesium hydroxide-simethicone (MAALOX MAX) 400-400-40 MG/5ML suspension 7.5 mL  7.5 mL Oral Q4H PRN Rocio Pitts APRN        bisacodyl (DULCOLAX) suppository 10 mg  10 mg Rectal Daily PRN Rocio Pitts APRN        Calcium Replacement - Follow Nurse / BPA Driven Protocol   Does not apply PRN Rocio Pitts APRN        cloNIDine (CATAPRES) tablet 0.1 mg  0.1 mg Oral Q4H PRN Agus Baltazar MD        docusate sodium (COLACE) capsule 100 mg  100 mg Oral Daily Rocio Pitts APRN   100 mg at 02/23/24 0748    folic acid 1 mg in sodium chloride 0.9 % 50 mL IVPB  1 mg Intravenous Daily Agus Baltazar  mL/hr at 02/23/24 0912 1 mg at 02/23/24 0912    influenza vac split quad (FLUZONE,FLUARIX,AFLURIA,FLULAVAL) injection 0.5 mL  0.5 mL Intramuscular During Hospitalization Rocio Pitts APRN        [COMPLETED] levETIRAcetam (KEPPRA) injection 1,000 mg  1,000 mg Intravenous Once Gabriel Mckeon MD   1,000 mg at  02/23/24 0110    levETIRAcetam (KEPPRA) injection 500 mg  500 mg Intravenous Q12H Wilbur Sheth MD   500 mg at 02/23/24 1056    [COMPLETED] lidocaine 1% - EPINEPHrine 1:363827 (XYLOCAINE W/EPI) 1 %-1:729994 injection 10 mL  10 mL Injection Once Gabriel Mckeon MD   10 mL at 02/23/24 0124    LORazepam (ATIVAN) injection 2 mg  2 mg Intravenous Q6H Agus Baltazar MD   2 mg at 02/23/24 0748    Followed by    [START ON 2/24/2024] LORazepam (ATIVAN) injection 1 mg  1 mg Intravenous Q6H Agus Baltazar MD        LORazepam (ATIVAN) tablet 1 mg  1 mg Oral Q1H PRN Agus Baltazar MD        Or    LORazepam (ATIVAN) injection 1 mg  1 mg Intravenous Q1H PRN Agus Baltazar MD        Or    LORazepam (ATIVAN) tablet 2 mg  2 mg Oral Q1H PRN Agus Baltazar MD        Or    LORazepam (ATIVAN) injection 2 mg  2 mg Intravenous Q1H PRN Agus Baltaazr MD        Or    LORazepam (ATIVAN) injection 2 mg  2 mg Intravenous Q15 Min PRN Agus Baltazar MD        Or    LORazepam (ATIVAN) injection 2 mg  2 mg Intramuscular Q15 Min PRN Agus Baltazar MD        Or    LORazepam (ATIVAN) tablet 4 mg  4 mg Oral Q1H PRN Agus Baltazar MD        Or    LORazepam (ATIVAN) injection 4 mg  4 mg Intravenous Q1H PRN Agus Baltazar MD        [COMPLETED] magnesium oxide (MAG-OX) tablet 800 mg  800 mg Oral Once Bernardo Zacarias MD   800 mg at 02/22/24 2118    Magnesium Standard Dose Replacement - Follow Nurse / BPA Driven Protocol   Does not apply PRN Rocio Pitts APRN        [COMPLETED] magnesium sulfate 2g/50 mL (PREMIX) infusion  2 g Intravenous Once Bernardo Zacarias MD   Stopped at 02/22/24 2312    niCARdipine (CARDENE) 25 mg in 250 mL NS infusion kit  5-15 mg/hr Intravenous Titrated Rocio Pitts APRN        [COMPLETED] ondansetron (ZOFRAN) injection 4 mg  4 mg Intravenous Once Gabriel Mckeon MD   4 mg at 02/23/24 0017    ondansetron (ZOFRAN) injection 4 mg  4 mg Intravenous Q6H PRN Rocio Pitts APRN         pantoprazole (PROTONIX) injection 40 mg  40 mg Intravenous Q AM Rocio Pitts APRN   40 mg at 02/23/24 0546    Phosphorus Replacement - Follow Nurse / BPA Driven Protocol   Does not apply PRN Rocio Pitts APRN        [COMPLETED] potassium chloride (K-DUR,KLOR-CON) ER tablet 40 mEq  40 mEq Oral Once Bernardo Zacarias MD   40 mEq at 02/22/24 2118    [COMPLETED] potassium chloride (KLOR-CON) packet 40 mEq  40 mEq Oral Q4H Rocio Pitts APRN   40 mEq at 02/23/24 0748    [COMPLETED] potassium phosphate 15 mmol in 0.9% normal saline 250 mL IVPB  15 mmol Intravenous Q3H Rocio Pitts APRN   15 mmol at 02/23/24 0913    Potassium Replacement - Follow Nurse / BPA Driven Protocol   Does not apply PRN Rocio Pitts APRN        [COMPLETED] sodium chloride 0.9 % bolus 1,000 mL  1,000 mL Intravenous Once Bernardo Zacarias MD   Stopped at 02/22/24 2117    [COMPLETED] sodium chloride 0.9 % bolus 1,000 mL  1,000 mL Intravenous Once Rocio Pitts APRN 1,000 mL/hr at 02/23/24 0431 1,000 mL at 02/23/24 0431    sodium chloride 0.9 % flush 10 mL  10 mL Intravenous Q12H Rocio Pitts APRN   10 mL at 02/23/24 0748    sodium chloride 0.9 % flush 10 mL  10 mL Intravenous PRN Rocio Pitts APRN        sodium chloride 0.9 % infusion 40 mL  40 mL Intravenous PRN Rocio Pitts APRN        sodium chloride 0.9 % infusion  150 mL/hr Intravenous Continuous [place]ne  mL/hr at 02/23/24 0614 150 mL/hr at 02/23/24 0614    [COMPLETED] Tetanus-Diphth-Acell Pertussis (BOOSTRIX) injection 0.5 mL  0.5 mL Intramuscular Once Gabriel Mckeon MD   0.5 mL at 02/23/24 0027    thiamine (B-1) injection 200 mg  200 mg Intravenous Q8H Agus Baltazar MD   200 mg at 02/23/24 0512    Followed by    [START ON 2/28/2024] thiamine (VITAMIN B-1) tablet 100 mg  100 mg Oral Daily Agus Baltazar MD         Orders (all)        Start     Ordered    02/28/24 0900  thiamine (VITAMIN B-1) tablet 100 mg  Daily         See Hyperspace for full Linked Orders Report.    02/23/24 0133    02/24/24 0149  LORazepam (ATIVAN) injection 1 mg  Every 6 Hours        See Hyperspace for full Linked Orders Report.    02/23/24 0133    02/23/24 1659  Phosphorus  Timed         02/23/24 0359    02/23/24 1218  POC Glucose Once  PROCEDURE ONCE        Comments: Complete no more than 45 minutes prior to patient eating      02/23/24 1206    02/23/24 1155  Phosphorus  Once         02/23/24 1155    02/23/24 1139  Duplex Portal Hepatic Complete CAR  Once        Comments: Elevated LFTs.    02/23/24 0858    02/23/24 1015  levETIRAcetam (KEPPRA) injection 500 mg  Every 12 Hours Scheduled         02/23/24 0925    02/23/24 0928  MRI Brain With & Without Contrast  1 Time Imaging         02/23/24 0927    02/23/24 0927  EEG Continuous Monitoring With Video  Once         02/23/24 0926    02/23/24 0926  EEG Awake or Drowsy Routine  Once         02/23/24 0926    02/23/24 0900  docusate sodium (COLACE) capsule 100 mg  Daily         02/23/24 0122    02/23/24 0900  folic acid 1 mg in sodium chloride 0.9 % 50 mL IVPB  Daily         02/23/24 0133    02/23/24 0900  Hepatitis Panel, Acute  Once         02/23/24 0859    02/23/24 0858  CK  Once         02/23/24 0857    02/23/24 0858  Lactate Dehydrogenase  Once         02/23/24 0857    02/23/24 0858  Gamma GT  Once         02/23/24 0857    02/23/24 0858  Aldolase  Once         02/23/24 0857    02/23/24 0858  Duplex Mesenteric Complete CAR  Once,   Status:  Canceled         02/23/24 0858    02/23/24 0830  CT Head Without Contrast  1 Time Imaging         02/23/24 0407    02/23/24 0732  Inpatient Nutrition Consult  Once        Provider:  (Not yet assigned)    02/23/24 0731    02/23/24 0731  influenza vac split quad (FLUZONE,FLUARIX,AFLURIA,FLULAVAL) injection 0.5 mL  During Hospitalization         02/23/24 0731    02/23/24 0702  Inpatient Gastroenterology Consult  IN AM        Specialty:  Gastroenterology  Provider:   Abisai Awan MD    02/23/24 0422    02/23/24 0702  Inpatient Neurology Consult General  IN AM        Specialty:  Neurology  Provider:  Wilbur Sheth MD    02/23/24 0456    02/23/24 0622  Diet: Liquid Diets; Clear Liquid; Fluid Consistency: Thin (IDDSI 0)  Diet Effective Now         02/23/24 0622    02/23/24 0600  POC Glucose Q6H  Every 6 Hours      Comments: May Discontinue After 2 Consecutive Readings Less Than 140Notify Provider if 2 Readings Greater Than 140      02/23/24 0122    02/23/24 0600  Hemoglobin A1c  Morning Draw         02/23/24 0122    02/23/24 0600  Lipid Panel  Morning Draw         02/23/24 0122    02/23/24 0600  CBC (No Diff)  Daily       02/23/24 0122    02/23/24 0600  Comprehensive Metabolic Panel  Daily       02/23/24 0122    02/23/24 0600  thiamine (B-1) injection 200 mg  Every 8 Hours Scheduled        See [facility] for full Linked Orders Report.    02/23/24 0133    02/23/24 0600  POC Glucose Finger Q6H  Every 6 Hours      Comments: Complete no more than 45 minutes prior to patient eating      02/23/24 0134    02/23/24 0600  Folate  Morning Draw         02/23/24 0328    02/23/24 0600  Vitamin B12  Morning Draw         02/23/24 0328    02/23/24 0600  pantoprazole (PROTONIX) injection 40 mg  Every Early Morning         02/23/24 0459    02/23/24 0600  Osmolality, Serum  Morning Draw         02/23/24 0510    02/23/24 0557  Procalcitonin  STAT         02/23/24 0556    02/23/24 0556  Lactic Acid, Plasma  STAT         02/23/24 0556    02/23/24 0542  POC Glucose Once  PROCEDURE ONCE        Comments: Complete no more than 45 minutes prior to patient eating      02/23/24 0540    02/23/24 0515  sodium chloride 0.9 % bolus 1,000 mL  Once         02/23/24 0429    02/23/24 0513  Beta Hydroxybutyrate Quantitative  Once         02/23/24 0513    02/23/24 0502  ECG 12 Lead Rhythm Change  STAT         02/23/24 0501    02/23/24 0500  potassium chloride (KLOR-CON) packet 40 mEq  Every 4 Hours          02/23/24 0404    02/23/24 0447  Occult Blood X 1, Stool - Stool, Per Rectum  Once         02/23/24 0446    02/23/24 0445  potassium phosphate 15 mmol in 0.9% normal saline 250 mL IVPB  Every 3 Hours         02/23/24 0359    02/23/24 0445  CT Abdomen Pelvis Without Contrast  1 Time Imaging         02/23/24 0444    02/23/24 0405  Keep systolic blood pressure less than 140  Physician Communication Order  Continuous        Comments: Keep systolic blood pressure less than 140    02/23/24 0407    02/23/24 0405  Inpatient Neurology Consult General  Once,   Status:  Canceled        Specialty:  Neurology  Provider:  Wilbur Sheth MD    02/23/24 0407    02/23/24 0400  Intake and Output  Every 4 Hours       02/23/24 0122 02/23/24 0255  POC Glucose Once  PROCEDURE ONCE        Comments: Complete no more than 45 minutes prior to patient eating      02/23/24 0253    02/23/24 0207  Laceration Repair  Once        Comments: This order was created via procedure documentation    02/23/24 0206    02/23/24 0148  LORazepam (ATIVAN) injection 2 mg  Every 6 Hours        See Hyperspace for full Linked Orders Report.    02/23/24 0133    02/23/24 0138  sodium chloride 0.9 % flush 10 mL  Every 12 Hours Scheduled         02/23/24 0122    02/23/24 0138  niCARdipine (CARDENE) 25 mg in 250 mL NS infusion kit  Titrated         02/23/24 0122    02/23/24 0138  sodium chloride 0.9 % infusion  Continuous         02/23/24 0122    02/23/24 0132  LORazepam (ATIVAN) tablet 1 mg  Every 1 Hour PRN        See Hyperspace for full Linked Orders Report.    02/23/24 0133    02/23/24 0132  LORazepam (ATIVAN) injection 1 mg  Every 1 Hour PRN        See Hyperspace for full Linked Orders Report.    02/23/24 0133    02/23/24 0132  LORazepam (ATIVAN) tablet 2 mg  Every 1 Hour PRN        See Hyperspace for full Linked Orders Report.    02/23/24 0133    02/23/24 0132  LORazepam (ATIVAN) injection 2 mg  Every 1 Hour PRN        See Hyperspace for full Linked  Orders Report.    02/23/24 0133    02/23/24 0132  LORazepam (ATIVAN) injection 2 mg  Every 15 Minutes PRN        See Hyperspace for full Linked Orders Report.    02/23/24 0133    02/23/24 0132  LORazepam (ATIVAN) injection 2 mg  Every 15 Minutes PRN        See Hyperspace for full Linked Orders Report.    02/23/24 0133    02/23/24 0132  LORazepam (ATIVAN) tablet 4 mg  Every 1 Hour PRN        See Hyperspace for full Linked Orders Report.    02/23/24 0133    02/23/24 0132  LORazepam (ATIVAN) injection 4 mg  Every 1 Hour PRN        See Hyperspace for full Linked Orders Report.    02/23/24 0133    02/23/24 0132  Ammonia  Once         02/23/24 0133 02/23/24 0132  CK  Once         02/23/24 0133 02/23/24 0130  Initiate Alcohol Withdrawal Protocol  Once         02/23/24 0133 02/23/24 0130  Vital Signs  Per Hospital Policy         02/23/24 0133 02/23/24 0130  Continuous Pulse Oximetry  Continuous         02/23/24 0133 02/23/24 0130  Obtain Baseline Clinical [place] Withdrawal Assessment - Ar, Sedation Scale & Vital Signs  Once        Comments: Follow CIWA Scoring Reference Guide    02/23/24 0133 02/23/24 0130  Clinical [place] Withdrawal Assessment (CIWA-Ar)  Per Hospital Policy         02/23/24 0133 02/23/24 0130  If CIWA-Ar Score Less Than 8 For 3 Consecutive Assessments, Monitor Every 4 Hours & Discontinue Assessment When CIWA-Ar Less Than 8 for 24 Hours  Per Hospital Policy        Comments: Contact Provider to Restart Protocol if Any Symptoms Reappear    02/23/24 0133 02/23/24 0130  Obtain Pre & Post Sedation Scores With Every Lorazepam Dose - Hold For POSS Greater Than 2 or RASS of -3 or Less  Continuous         02/23/24 0133 02/23/24 0130  Seizure Precautions  Continuous         02/23/24 0133 02/23/24 0130  Notify Provider - Withdrawal  Until Discontinued         02/23/24 0133 02/23/24 0130  Notify Provider of Abnormal Lab Results  Until Discontinued         02/23/24 0133 02/23/24  0130  Notify Provider - Vitals  Until Discontinued         02/23/24 0133    02/23/24 0130  Safety Precautions  Continuous         02/23/24 0133    02/23/24 0129  cloNIDine (CATAPRES) tablet 0.1 mg  Every 4 Hours PRN         02/23/24 0133    02/23/24 0124  Magnesium  Once         02/23/24 0124    02/23/24 0124  Phosphorus  Once         02/23/24 0124    02/23/24 0123  Potassium Replacement - Follow Nurse / BPA Driven Protocol  As Needed         02/23/24 0124    02/23/24 0123  Magnesium Standard Dose Replacement - Follow Nurse / BPA Driven Protocol  As Needed         02/23/24 0124    02/23/24 0123  Phosphorus Replacement - Follow Nurse / BPA Driven Protocol  As Needed         02/23/24 0124    02/23/24 0123  Calcium Replacement - Follow Nurse / BPA Driven Protocol  As Needed         02/23/24 0124    02/23/24 0121  Vital Signs  Per Order Details        Comments: For ICU Admission: Every 2 hours  For Telemetry Unit Admission: Vital Signs Every 4 Hours    02/23/24 0122    02/23/24 0121  Pulse Oximetry, Continuous  Continuous,   Status:  Canceled         02/23/24 0122    02/23/24 0121  Continuous Cardiac Monitoring  Continuous        Comments: Follow Standing Orders As Outlined in Process Instructions (Open Order Report to View Full Instructions)    02/23/24 0122    02/23/24 0121  Telemetry - Maintain IV Access  Continuous,   Status:  Canceled         02/23/24 0122    02/23/24 0121  Telemetry - Place Orders & Notify Provider of Results When Patient Experiences Acute Chest Pain, Dysrhythmia or Respiratory Distress  Until Discontinued         02/23/24 0122    02/23/24 0121  Notify Provider  Until Discontinued         02/23/24 0122    02/23/24 0121  Head of Bed  Until Discontinued         02/23/24 0122    02/23/24 0121  NPO Diet NPO Type: Strict NPO  Diet Effective Now,   Status:  Canceled        Comments: Strict NPO Until Nursing Dysphagia Screen Passed    02/23/24 0122    02/23/24 0121  Nursing Dysphagia Screening (Complete  Prior to Giving Anything By Mouth)  Once         02/23/24 0122    02/23/24 0121  RN to Place Order SLP Consult - Eval & Treat Choosing Reason of RN Dysphagia Screen Failed  Per Order Details        Comments: RN to Place Order SLP Consult - Eval & Treat Choosing Reason of RN Dysphagia Screen Failed    02/23/24 0122    02/23/24 0121  Nurse to Call MD or Nutrition Services for Diet if Patient Passes Dysphagia Screen  Once         02/23/24 0122    02/23/24 0121  Neuro Checks  Per Order Details        Comments: For ICU Admission: Neuro Checks to Include All Stroke Deficits Every Hour x10 Hours, Then Every 2 Hours,  For Telemetry Unit Admission: Neuro Checks to Include All Stroke Deficits Every 4 Hours    02/23/24 0122    02/23/24 0121  NIHSS Assessment  Every Shift      Comments: Turn off all sedation medications prior to performing assessment. Assessment to be performed upon admission, transfer to another unit, discharge, and with neurological decline. If NIHSS change is greater than or equal to 4 and/or neurological decline is noted notify physician.    02/23/24 0122    02/23/24 0121  Provide Stroke Education Material  Prior to Discharge        Comments: Educate patient PRN and daily during hospitalization.    02/23/24 0122    02/23/24 0121  Notify Stroke Coordinator  Once        Provider:  (Not yet assigned)    02/23/24 0122    02/23/24 0121  Inpatient Rehab Admission Consult  Once        Provider:  (Not yet assigned)    02/23/24 0122    02/23/24 0121  OT Consult: Eval & Treat  Once         02/23/24 0122    02/23/24 0121  PT Consult: Eval & Treat As Tolerated  Once         02/23/24 0122    02/23/24 0121  SLP Consult: Eval & Treat Communication Disorder  Once        Comments: Per stroke protocol.    02/23/24 0122    02/23/24 0121  Inpatient Case Management  Consult  Once        Provider:  (Not yet assigned)    02/23/24 0122 02/23/24 0121  Inpatient Diabetes Educator Consult  Once,   Status:  Canceled         Provider:  (Not yet assigned)    02/23/24 0122    02/23/24 0121  Inpatient Neurosurgery Consult  Once        Specialty:  Neurosurgery  Provider:  (Not yet assigned)    02/23/24 0122    02/23/24 0121  Assessed for Rehabilitation Services  Once         02/23/24 0122    02/23/24 0121  Adult Transthoracic Echo Complete W/ Cont if Necessary Per Protocol (With Agitated Saline)  Once,   Status:  Canceled         02/23/24 0122    02/23/24 0121  Insert Peripheral IV  Once         02/23/24 0122    02/23/24 0121  Saline Lock & Maintain IV Access  Continuous         02/23/24 0122    02/23/24 0121  Place Sequential Compression Device  Once         02/23/24 0122    02/23/24 0121  Maintain Sequential Compression Device  Continuous         02/23/24 0122    02/23/24 0121  Code Status and Medical Interventions:  Continuous         02/23/24 0122    02/23/24 0120  sodium chloride 0.9 % flush 10 mL  As Needed         02/23/24 0122    02/23/24 0120  sodium chloride 0.9 % infusion 40 mL  As Needed         02/23/24 0122    02/23/24 0120  acetaminophen (TYLENOL) tablet 650 mg  Every 4 Hours PRN        See Hyperspace for full Linked Orders Report.    02/23/24 0122    02/23/24 0120  acetaminophen (TYLENOL) suppository 650 mg  Every 4 Hours PRN        See Hyperspace for full Linked Orders Report.    02/23/24 0122    02/23/24 0120  ondansetron (ZOFRAN) injection 4 mg  Every 6 Hours PRN         02/23/24 0122    02/23/24 0120  bisacodyl (DULCOLAX) suppository 10 mg  Daily PRN         02/23/24 0122    02/23/24 0120  aluminum-magnesium hydroxide-simethicone (MAALOX MAX) 400-400-40 MG/5ML suspension 7.5 mL  Every 4 Hours PRN         02/23/24 0122    02/23/24 0118  Inpatient Admission  Once         02/23/24 0117    02/23/24 0109  levETIRAcetam (KEPPRA) injection 1,000 mg  Once         02/23/24 0053    02/23/24 0057  Pulmonology (on-call MD unless specified)  Once        Specialty:  Pulmonary Disease  Provider:  (Not yet assigned)    02/23/24  0056    02/23/24 0054  Neurosurgery (on-call MD unless specified)  Once        Specialty:  Neurosurgery  Provider:  Je Celeste MD    02/23/24 0053    02/23/24 0028  ondansetron (ZOFRAN) injection 4 mg  Once         02/23/24 0012    02/23/24 0027  Protime-INR  Once         02/23/24 0026    02/23/24 0027  aPTT  Once         02/23/24 0026    02/23/24 0002  Tetanus-Diphth-Acell Pertussis (BOOSTRIX) injection 0.5 mL  Once         02/22/24 2346    02/23/24 0002  lidocaine 1% - EPINEPHrine 1:631495 (XYLOCAINE W/EPI) 1 %-1:456742 injection 10 mL  Once         02/22/24 2346    02/23/24 0002  Urinalysis, Microscopic Only - Urine, Clean Catch  Once         02/23/24 0001    02/22/24 2347  Supplies To Bedside - Notify MD When Ready- Suture Tray / Cart  Once         02/22/24 2346    02/22/24 2346  Please irrigate and clean wound  Misc Nursing Order (Specify)  Once        Comments: Please irrigate and clean wound    02/22/24 2346    02/22/24 2342  CT Cervical Spine Without Contrast  1 Time Imaging         02/22/24 2341    02/22/24 2339  CT Head Without Contrast  1 Time Imaging         02/22/24 2339    02/22/24 2206  Basic Metabolic Panel  Once         02/22/24 2205 02/22/24 2121  magnesium sulfate 2g/50 mL (PREMIX) infusion  Once         02/22/24 2105 02/22/24 2121  potassium chloride (K-DUR,KLOR-CON) ER tablet 40 mEq  Once         02/22/24 2105 02/22/24 2121  magnesium oxide (MAG-OX) tablet 800 mg  Once         02/22/24 2105 02/22/24 2107  Please complete IV fluid bolus as soon as possible and obtain repeat BMP  Misc Nursing Order (Specify)  Once        Comments: Please complete IV fluid bolus as soon as possible and obtain repeat BMP    02/22/24 2107 02/22/24 2027  Manual Differential  Once,   Status:  Canceled         02/22/24 2026 02/22/24 2018  sodium chloride 0.9 % bolus 1,000 mL  Once         02/22/24 2002 02/22/24 2003  CBC & Differential  STAT         02/22/24 2002 02/22/24 2003   Comprehensive Metabolic Panel  STAT         02/22/24 2002 02/22/24 2003  Urinalysis With Microscopic If Indicated (No Culture) - Urine, Clean Catch  STAT         02/22/24 2002 02/22/24 2003  Urine Drug Screen - Urine, Clean Catch  STAT         02/22/24 2002 02/22/24 2003  Ethanol  STAT         02/22/24 2002 02/22/24 2003  Magnesium  STAT         02/22/24 2002 02/22/24 2003  ECG 12 Lead Syncope  Once         02/22/24 2002 02/22/24 2003  XR Chest 1 View  1 Time Imaging         02/22/24 2002 02/22/24 2003  CT Head Without Contrast  1 Time Imaging         02/22/24 2002 02/22/24 2003  Cardiac Monitoring  Continuous,   Status:  Canceled         02/22/24 2002 02/22/24 2003  Continuous Pulse Oximetry  Continuous,   Status:  Canceled         02/22/24 2002 02/22/24 2003  CBC Auto Differential  PROCEDURE ONCE         02/22/24 2002    Unscheduled  Order CT Head Without Contrast for Neurological Decline  As Needed       02/23/24 0122                     Physician Progress Notes (last 24 hours)        Denver Hammond APRN at 02/23/24 0400          Received call on Mr. Peck for bilateral subdural hematomas with 6 mm midline shift and small parafalcine subdural hematoma.  Nursing reports patient is neurologically stable at this time but somewhat drowsy.  States the patient is being treated for alcohol withdrawal and reportedly had a seizure.  I ordered neurology consult for seizure management as well as systolic blood pressure to be maintained less than 140.  Intensivist to manage seizure prophylaxis until neurology involved.  Last CT head was completed at 0019 AM.  We will repeat another head CT approximately 8 hours from this time at 8:30 AM.  Nursing to call for any neurologic change or decline.    Electronically signed by Denver Hammond APRN at 02/23/24 0754          Consult Notes (last 24 hours)        Wilbur Sheth MD at 02/23/24 1027        Consult Orders    1. Inpatient  "Neurology Consult General [[id number]] ordered by Rocio Pitts APRN at 02/23/24 0456                 Neurology Consult Note    Consult Date: 2/23/2024    Referring MD: Bernardo Zacarias MD    Reason for Consult I have been asked to see the patient in neurological consultation to render advice and opinion regarding seizures.    Nida Peck is a 29 y.o. -American male with known diagnosis of alcohol abuse, alcohol withdrawal seizures, alcoholic hepatitis, malnutrition, iron deficiency anemia, thrombocytopenia, Je Alvaro syndrome hold back from: Anxiety, depression, GERD, esophagitis who presented to the ED for evaluation of seizure like activity while at work.  Currently the patient was awake, alert and oriented and he was able to provide the history he stated like 3 days ago he drank 1 pint of vodka and then yesterday while at work he fell and had generalized tonic-clonic activity, he said he had similar episode of seizures in the past when he is not drinking for few days, he denied childhood epilepsy or family history of seizures.  He denied having aura prior to the seizures, he was not sure about semiology but he would lose consciousness and was told to have generalized tonic-clonic activity followed by postictal confusion.  His CT head revealed left subdural hematoma with midline shift of 6 mm as well as trace left subarachnoid, parafalcine and tentorial hemorrhage.  He was given 1 g of IV Keppra and 2 g of magnesium sulfate, currently neurosurgery consulted and following for the subdural hematoma.  Neurology being consulted for the seizures.    History reviewed. No pertinent past medical history.    Exam  /85   Pulse 86   Temp 98.8 °F (37.1 °C) (Oral)   Resp 16   Ht 185.4 cm (73\")   Wt 65.7 kg (144 lb 13.5 oz)   SpO2 100%   BMI 19.11 kg/m²   Gen: Depressed mood, swollen right eye, vitals reviewed  MS: oriented x3, recent/remote memory intact, normal attention/concentration, language " intact, no neglect.  CN: visual acuity grossly normal, PERRL, EOMI, no facial droop, no dysarthria  Motor: 5/5 throughout upper and lower extremities, normal tone    DATA:    Lab Results   Component Value Date    GLUCOSE 89 02/23/2024    CALCIUM 8.3 (L) 02/23/2024     02/23/2024    K 3.2 (L) 02/23/2024    CO2 20.9 (L) 02/23/2024    CL 96 (L) 02/23/2024    BUN 5 (L) 02/23/2024    CREATININE 0.74 (L) 02/23/2024    EGFRIFAFRI >60 01/13/2023    BCR 6.8 (L) 02/23/2024    ANIONGAP 21.1 (H) 02/23/2024     Lab Results   Component Value Date    WBC 5.13 02/23/2024    HGB 11.5 (L) 02/23/2024    HCT 33.4 (L) 02/23/2024    MCV 97.4 (H) 02/23/2024    PLT 81 (L) 02/23/2024       Lab review: Sodium 128, BUN 5, creatinine 0.74, WBC 4.98 yesterday today 5.13, alcohol level less than 10, urine drug screen negative, B12 726, CK2 36, lactate dehydrogenase elevated at 465, GGT elevated at 573.    Imaging review: I personally reviewed his CT scan of the head which showed left frontoparietal acute subdural hematoma 6 mm in thickness.  Radiology report reviewed.    IMPRESSION:  The pre-existing subdural hematoma overlying the left  cerebral hemisphere is unchanged in size measuring approximately 6 mm in  thickness. It has increased in attenuation however. Subarachnoid blood  overlying the left frontal lobe is noted, present previously and  unchanged. Continued surveillance is recommended.    Diagnoses:  -Generalized tonic-clonic seizure likely from alcohol withdrawal  -Left hemispheric subdural hematoma  -Alcohol dependence  -Alcohol withdrawal  -Alcoholic hepatitis      PLAN:   1.  Started on Keppra 500 twice daily  2.  Continuous EEG  3.  MRI brain with and without contrast  4.  Management of subdural hematoma as per the neurosurgery team  5.  Counseled the patient regarding alcohol abuse  6.  Continue thiamine replacement and CIWA protocol    Discussed with patient, ICU attending Dr. Wynne    Medical Decision Making for this  neurology consultation consists of the following:  Review of previous chart, including H/P, provider and nursing notes as applicable.  Review of medications and vital signs.  Review of previous labs, neuroimaging, and additional relevant diagnostics.   Interpretation of laboratory, imaging, and other diagnostic results  Discussion with other providers and family   Total face-to-face/floor time: 60 minutes.          Electronically signed by Wilbur Sheth MD at 02/23/24 1037       Rad Hernández MD at 02/23/24 0705          Gastroenterology   Initial Inpatient Consult Note    Referring Provider: Agus Baltazar    Reason for Consultation: elevated LFT    Subjective     History of present illness:    29 y.o. male who presented after a seizure and head strike.  He has presented with a subdural hematoma and a subarachnoid hemorrhage with midline shift and brain swelling.  Keppra has been started as well as Ativan due to the patient being a heavy drinker and concern over withdrawal seizures.  There has been some documentation of diarrhea.  The patient has been started on thiamine and folic acid electrolyte disturbances are being evaluated and treated    increased from 224 and 78 yesterday and relatively normal baseline previously.  Total bilirubin 1.2 alk phos 244 potassium 3.2 creatinine 0.74 folate phosphorus 1.5 decreased at 3.18 INR 1.19 hemoglobin 11.5 white count 5.13 MCV 97.4  CT of the abdomen and pelvis has been ordered  CT Head Without Contrast (02/23/2024 04:07)   Patient has bilateral subdural hematomas with 6 mm midline shift and a small parafalcine subdural hematoma    Patient nods to yes and no questions.  He denies abdominal pain at the moment.  He does not endorse that he has been told he had liver disease before but he does endorse significant alcohol use    Past Medical History:  History reviewed. No pertinent past medical history.  Past Surgical History:  History reviewed. No  pertinent surgical history.   Social History:   Social History     Tobacco Use    Smoking status: Some Days     Types: Cigarettes     Start date: 2010    Smokeless tobacco: Never   Substance Use Topics    Alcohol use: Not on file      Family History:  History reviewed. No pertinent family history.    Home Meds:  No medications prior to admission.     Current Meds:   docusate sodium, 100 mg, Oral, Daily  folic acid 1 mg in sodium chloride 0.9 % 50 mL IVPB, 1 mg, Intravenous, Daily  LORazepam, 2 mg, Intravenous, Q6H   Followed by  [START ON 2/24/2024] LORazepam, 1 mg, Intravenous, Q6H  pantoprazole, 40 mg, Intravenous, Q AM  potassium chloride, 40 mEq, Oral, Q4H  potassium phosphate, 15 mmol, Intravenous, Q3H  sodium chloride, 10 mL, Intravenous, Q12H  thiamine (B-1) IV, 200 mg, Intravenous, Q8H   Followed by  [START ON 2/28/2024] thiamine, 100 mg, Oral, Daily      Allergies:  Allergies   Allergen Reactions    Penicillins Unknown - Low Severity    Sulfa Antibiotics Unknown - Low Severity     Review of Systems  Pertinent items are noted in HPI, all other systems reviewed and negative    Objective     Vital Signs  Temp:  [98.7 °F (37.1 °C)-99.2 °F (37.3 °C)] 99 °F (37.2 °C)  Heart Rate:  [] 147  Resp:  [16-19] 18  BP: (102-147)/() 109/79    Physical Exam:  CONSTITUTIONAL:  today's vital signs reviewed  EARS NOSE THROAT: trachea midline and no deformity of the nares, right side of the face with multiple swelling and ecchymoses  EYES: no scleral icterus  GASTROINTESTINAL: abdomen is soft, nontender, nondistended with normal active bowel sounds, no masses are appreciated  PSYCHIATRIC: appropriate mood and affect  RESPIRATORY: normal inspiratory effort with no increased work of breathing  NEUROLOGIC: patient is awake and alert  DERMATOLOGIC: skin is warm with no cyanosis  LYMPHATIC: no periumbilical lymphadenopathy     Results Review:              I reviewed the patient's new clinical results.    Results from  last 7 days   Lab Units 02/23/24  0300 02/22/24 2013   WBC 10*3/mm3 5.13 4.98   HEMOGLOBIN g/dL 11.5* 12.7*   HEMATOCRIT % 33.4* 36.6*   PLATELETS 10*3/mm3 81* 96*     Results from last 7 days   Lab Units 02/23/24  0300 02/22/24 2217 02/22/24 2013   SODIUM mmol/L 138 135* 137   POTASSIUM mmol/L 3.2* 3.2* 3.2*   CHLORIDE mmol/L 96* 96* 90*   CO2 mmol/L 20.9* 19.8* 14.9*   BUN mg/dL 5* 6 6   CREATININE mg/dL 0.74* 0.70* 0.88   CALCIUM mg/dL 8.3* 7.9* 8.5*   BILIRUBIN mg/dL 1.2  --  0.7   ALK PHOS U/L 244*  --  244*   ALT (SGPT) U/L 100*  --  78*   AST (SGOT) U/L 567*  --  224*   GLUCOSE mg/dL 89 65 125*     Results from last 7 days   Lab Units 02/23/24  0300   INR  1.19*     Lab Results   Lab Value Date/Time    LIPASE 22 01/09/2023 0900    LIPASE 19 11/20/2022 2136    LIPASE 22 01/06/2022 1628    LIPASE 49 (H) 12/22/2021 1342    LIPASE 72 (H) 08/24/2021 0242       Radiology:  CT Head Without Contrast   Final Result       1. Left cerebral convexity subdural hematoma with a maximum thickness of   1.1 cm. Midline shift of 6 mm. There is also trace subarachnoid   hemorrhage, as well as trace left parafalcine and left tentorial   hemorrhage.   2. No acute fracture or subluxation of the cervical spine is seen.       FINDINGS were discussed with Dr. Mckeon at 12:52 a.m.               Radiation dose reduction techniques were utilized, including automated   exposure control and exposure modulation based on body size.           This report was finalized on 2/23/2024 12:55 AM by Dr. Alexa Ferrer M.D on Workstation: [id number]          CT Cervical Spine Without Contrast   Final Result       1. Left cerebral convexity subdural hematoma with a maximum thickness of   1.1 cm. Midline shift of 6 mm. There is also trace subarachnoid   hemorrhage, as well as trace left parafalcine and left tentorial   hemorrhage.   2. No acute fracture or subluxation of the cervical spine is seen.       FINDINGS were discussed with   Mike at 12:52 a.m.               Radiation dose reduction techniques were utilized, including automated   exposure control and exposure modulation based on body size.           This report was finalized on 2/23/2024 12:55 AM by Dr. Alexa Ferrer M.D on Workstation: [id number]          XR Chest 1 View   Final Result      CT Head Without Contrast   Final Result       No acute intracranial hemorrhage or hydrocephalus. Cyst of the septum   pellucidum.        For further evaluation of the cause of patient's seizure, if indicated,   enhanced MRI is advised.       This report was finalized on 2/22/2024 8:41 PM by Dr. Albert Moran M.D on Workstation: [id number]          CT Head Without Contrast    (Results Pending)   CT Abdomen Pelvis Without Contrast    (Results Pending)       Assessment & Plan   Active Hospital Problems    Diagnosis     **Subdural hematoma        Assessment:  Elevated LFTs.  Certainly can be some component of alcoholic hepatitis Toñitorey's DF 6.2 also may be muscle injury.  Initial CK negative but the LFTs were also lower and have been rising.  Less likely acute hepatitis or vascular injury, or ischemic hepatopathy  Seizure and subdural hematoma  Alcohol use  Folate deficiency  Alcohol withdrawal concerns    Plan:  Neurosurgery evaluation in progress, monitoring with frequent imaging  Will recheck CK to see if it is rising, check aldolase, GGT, LDH, ultrasound with Dopplers, acute hepatitis panel  Trend LFTs  Alcohol withdrawal precautions  No indication for steroids currently  Replete electrolytes  Replete folate      I discussed the patients findings and my recommendations with patient.           Rad Hernández M.D.  [facility] [place] [address]  Office: [phone]       Electronically signed by Rad Hernández MD at 02/23/24 0900

## 2024-02-23 NOTE — PLAN OF CARE
Goal Outcome Evaluation:  Plan of Care Reviewed With: patient           Outcome Evaluation: Clinical swallow eval completed. Pt given trials of thin (cup/straw), pureed, soft, mixed, and regular consistencies. Pt showed no overt s/s. Vocal quality remained clear throughout trials. Mastication was slow, but functional for regular solids. Larygneal elevation was adequate with palpation. Recommend advance to regular and thin; meds as tolerated; upright for meals and 30 min after; slow rate; small bites/sips. Monitor for s/s of aspiration. ST to follow for tolerance and need for cog eval.      Anticipated Discharge Disposition (SLP): unknown          SLP Swallowing Diagnosis: functional pharyngeal phase, functional oral phase (02/23/24 1400)

## 2024-02-23 NOTE — CONSULTS
Gastroenterology   Initial Inpatient Consult Note    Referring Provider: Agus Baltazar    Reason for Consultation: elevated LFT    Subjective     History of present illness:    29 y.o. male who presented after a seizure and head strike.  He has presented with a subdural hematoma and a subarachnoid hemorrhage with midline shift and brain swelling.  Keppra has been started as well as Ativan due to the patient being a heavy drinker and concern over withdrawal seizures.  There has been some documentation of diarrhea.  The patient has been started on thiamine and folic acid electrolyte disturbances are being evaluated and treated    increased from 224 and 78 yesterday and relatively normal baseline previously.  Total bilirubin 1.2 alk phos 244 potassium 3.2 creatinine 0.74 folate phosphorus 1.5 decreased at 3.18 INR 1.19 hemoglobin 11.5 white count 5.13 MCV 97.4  CT of the abdomen and pelvis has been ordered  CT Head Without Contrast (02/23/2024 04:07)   Patient has bilateral subdural hematomas with 6 mm midline shift and a small parafalcine subdural hematoma    Patient nods to yes and no questions.  He denies abdominal pain at the moment.  He does not endorse that he has been told he had liver disease before but he does endorse significant alcohol use    Past Medical History:  History reviewed. No pertinent past medical history.  Past Surgical History:  History reviewed. No pertinent surgical history.   Social History:   Social History     Tobacco Use    Smoking status: Some Days     Types: Cigarettes     Start date: 2010    Smokeless tobacco: Never   Substance Use Topics    Alcohol use: Not on file      Family History:  History reviewed. No pertinent family history.    Home Meds:  No medications prior to admission.     Current Meds:   docusate sodium, 100 mg, Oral, Daily  folic acid 1 mg in sodium chloride 0.9 % 50 mL IVPB, 1 mg, Intravenous, Daily  LORazepam, 2 mg, Intravenous, Q6H   Followed by  [START  ON 2/24/2024] LORazepam, 1 mg, Intravenous, Q6H  pantoprazole, 40 mg, Intravenous, Q AM  potassium chloride, 40 mEq, Oral, Q4H  potassium phosphate, 15 mmol, Intravenous, Q3H  sodium chloride, 10 mL, Intravenous, Q12H  thiamine (B-1) IV, 200 mg, Intravenous, Q8H   Followed by  [START ON 2/28/2024] thiamine, 100 mg, Oral, Daily      Allergies:  Allergies   Allergen Reactions    Penicillins Unknown - Low Severity    Sulfa Antibiotics Unknown - Low Severity     Review of Systems  Pertinent items are noted in HPI, all other systems reviewed and negative    Objective     Vital Signs  Temp:  [98.7 °F (37.1 °C)-99.2 °F (37.3 °C)] 99 °F (37.2 °C)  Heart Rate:  [] 147  Resp:  [16-19] 18  BP: (102-147)/() 109/79    Physical Exam:  CONSTITUTIONAL:  today's vital signs reviewed  EARS NOSE THROAT: trachea midline and no deformity of the nares, right side of the face with multiple swelling and ecchymoses  EYES: no scleral icterus  GASTROINTESTINAL: abdomen is soft, nontender, nondistended with normal active bowel sounds, no masses are appreciated  PSYCHIATRIC: appropriate mood and affect  RESPIRATORY: normal inspiratory effort with no increased work of breathing  NEUROLOGIC: patient is awake and alert  DERMATOLOGIC: skin is warm with no cyanosis  LYMPHATIC: no periumbilical lymphadenopathy     Results Review:              I reviewed the patient's new clinical results.    Results from last 7 days   Lab Units 02/23/24  0300 02/22/24 2013   WBC 10*3/mm3 5.13 4.98   HEMOGLOBIN g/dL 11.5* 12.7*   HEMATOCRIT % 33.4* 36.6*   PLATELETS 10*3/mm3 81* 96*     Results from last 7 days   Lab Units 02/23/24  0300 02/22/24 2217 02/22/24 2013   SODIUM mmol/L 138 135* 137   POTASSIUM mmol/L 3.2* 3.2* 3.2*   CHLORIDE mmol/L 96* 96* 90*   CO2 mmol/L 20.9* 19.8* 14.9*   BUN mg/dL 5* 6 6   CREATININE mg/dL 0.74* 0.70* 0.88   CALCIUM mg/dL 8.3* 7.9* 8.5*   BILIRUBIN mg/dL 1.2  --  0.7   ALK PHOS U/L 244*  --  244*   ALT (SGPT) U/L 100*   --  78*   AST (SGOT) U/L 567*  --  224*   GLUCOSE mg/dL 89 65 125*     Results from last 7 days   Lab Units 02/23/24  0300   INR  1.19*     Lab Results   Lab Value Date/Time    LIPASE 22 01/09/2023 0900    LIPASE 19 11/20/2022 2136    LIPASE 22 01/06/2022 1628    LIPASE 49 (H) 12/22/2021 1342    LIPASE 72 (H) 08/24/2021 0242       Radiology:  CT Head Without Contrast   Final Result       1. Left cerebral convexity subdural hematoma with a maximum thickness of   1.1 cm. Midline shift of 6 mm. There is also trace subarachnoid   hemorrhage, as well as trace left parafalcine and left tentorial   hemorrhage.   2. No acute fracture or subluxation of the cervical spine is seen.       FINDINGS were discussed with Dr. Mckeon at 12:52 a.m.               Radiation dose reduction techniques were utilized, including automated   exposure control and exposure modulation based on body size.           This report was finalized on 2/23/2024 12:55 AM by Dr. Alexa Ferrer M.D on Workstation: [id number]          CT Cervical Spine Without Contrast   Final Result       1. Left cerebral convexity subdural hematoma with a maximum thickness of   1.1 cm. Midline shift of 6 mm. There is also trace subarachnoid   hemorrhage, as well as trace left parafalcine and left tentorial   hemorrhage.   2. No acute fracture or subluxation of the cervical spine is seen.       FINDINGS were discussed with Dr. Mckeon at 12:52 a.m.               Radiation dose reduction techniques were utilized, including automated   exposure control and exposure modulation based on body size.           This report was finalized on 2/23/2024 12:55 AM by Dr. Alexa Ferrer M.D on Workstation: [id number]          XR Chest 1 View   Final Result      CT Head Without Contrast   Final Result       No acute intracranial hemorrhage or hydrocephalus. Cyst of the septum   pellucidum.        For further evaluation of the cause of patient's seizure, if indicated,    enhanced MRI is advised.       This report was finalized on 2/22/2024 8:41 PM by Dr. Albert Moran M.D on Workstation: [id number]          CT Head Without Contrast    (Results Pending)   CT Abdomen Pelvis Without Contrast    (Results Pending)       Assessment & Plan   Active Hospital Problems    Diagnosis     **Subdural hematoma        Assessment:  Elevated LFTs.  Certainly can be some component of alcoholic hepatitis Emilee's DF 6.2 also may be muscle injury.  Initial CK negative but the LFTs were also lower and have been rising.  Less likely acute hepatitis or vascular injury, or ischemic hepatopathy  Seizure and subdural hematoma  Alcohol use  Folate deficiency  Alcohol withdrawal concerns    Plan:  Neurosurgery evaluation in progress, monitoring with frequent imaging  Will recheck CK to see if it is rising, check aldolase, GGT, LDH, ultrasound with Dopplers, acute hepatitis panel  Trend LFTs  Alcohol withdrawal precautions  No indication for steroids currently  Replete electrolytes  Replete folate      I discussed the patients findings and my recommendations with patient.           Rad Hernández M.D.  [facility] [place] [id number]  Office: [phone]

## 2024-02-23 NOTE — PROGRESS NOTES
[facility] Acute Inpt Rehab Note     Referral received via stroke order set.  Please note this is a screening only, rehab admissions will not actively be evaluating this patient.  If felt patient is appropriate for our services once therapies begin, please call our office at [phone], to initiate a full referral.    Thank you,   Susan Allen RN   Rehab Admission Nurse

## 2024-02-23 NOTE — PROGRESS NOTES
"  Daytime Intensivist Follow Up Note.   [facility] INTENSIVE CARE  2/23/2024    Patient:  Name:  Nida Peck  MRN:  [id number]  1995  29 y.o.  male         CC: [facility] SDH, afib rvr,     Interval History:  Admitted overnight by Dr Baltazar, note reviewed.  Mayo Rn.  Afebrile, int afib rvr, improved, on room air  Denies cp or soa at present  Asleep but awakens answers questions appropriately  Got prn ativan   Does not appear in distress at present    Physical Exam:  /88   Pulse 74   Temp 98.2 °F (36.8 °C) (Oral)   Resp 14   Ht 185.4 cm (73\")   Wt 65.7 kg (144 lb 13.5 oz)   SpO2 99%   BMI 19.11 kg/m²   Body mass index is 19.11 kg/m².    Intake/Output Summary (Last 24 hours) at 2/23/2024 1613  Last data filed at 2/23/2024 1200  Gross per 24 hour   Intake 3874 ml   Output 325 ml   Net 3549 ml     General appearance: Nontoxic, conversant   Eyes: anicteric sclerae, moist conjunctivae; no lidlag;    HENT: +lac with sutures over right orbit without purulence eye swollen shut; oropharynx clear   Neck: Trachea midline;  supple   Lungs: CTA, with normal respiratory effort and no intercostal retractions  CV: RRR, no rub   Abdomen: Soft, nontender; BS+  Extremities: No peripheral edema    Skin: WWP    Psych/neuro: drowsy but will awaken speech intact moves ext    Data Review:  Notable Labs:  Results from last 7 days   Lab Units 02/23/24  0300 02/22/24 2013   WBC 10*3/mm3 5.13 4.98   HEMOGLOBIN g/dL 11.5* 12.7*   PLATELETS 10*3/mm3 81* 96*     Results from last 7 days   Lab Units 02/23/24  0300 02/22/24 2217 02/22/24 2013   SODIUM mmol/L 138 135* 137   POTASSIUM mmol/L 3.2* 3.2* 3.2*   CHLORIDE mmol/L 96* 96* 90*   CO2 mmol/L 20.9* 19.8* 14.9*   BUN mg/dL 5* 6 6   CREATININE mg/dL 0.74* 0.70* 0.88   GLUCOSE mg/dL 89 65 125*   CALCIUM mg/dL 8.3* 7.9* 8.5*   MAGNESIUM mg/dL  --  1.8 1.2*   PHOSPHORUS mg/dL 1.5* 1.5*  --    Estimated Creatinine Clearance: 134.4 mL/min (A) (by C-G formula based on SCr of " 0.74 mg/dL (L)).    Results from last 7 days   Lab Units 02/23/24  0617 02/23/24  0511 02/23/24  0300 02/22/24 2013   LDH U/L  --  465*  --   --    AST (SGOT) U/L  --   --  567* 224*   ALT (SGPT) U/L  --   --  100* 78*   PROCALCITONIN ng/mL  --  0.60*  --   --    LACTATE mmol/L 1.5  --   --   --    PLATELETS 10*3/mm3  --   --  81* 96*             Imaging:  Reviewed chest images personally from past 3 days    ASSESSMENT  /  PLAN:  Alcohol dependence and withdrawal  Seizure activity  Subdural hematoma  Subarachnoid hemorrhage  High anion gap metabolic acidosis  Alcoholic ketoacidosis  Thrombocytopenia  Elevated liver enzymes  Nausea and vomiting  Diarrhea  Macrocytic anemia  Electrolyte disturbance  Hyperglycemia  Atrial fib RVR    Gi consult appreciated  Nsy consulted [facility] SDH mgmt per their recs  Ciwa protocol  Cardene prn  Clonidine prn  Thiamine folate  Sodium chloride infusion    Neuro consult dw Dr Sheth, keppra 500 bid  MRI  Thiamine  Continuous eeg    Serial cbc    Afib rvr  int control, electrolyte replete, add metoprolol 12.5    All issues new to me today.  Prior hospital course, labs and imaging reviewed.     Electronically signed by Lincoln Wynne MD, 02/23/24, 4:13 PM EST.  [facility]    Addendum  Repeat renal function panel now  lactic acid wnl  Cmp in am  Add dextrose to ivfs.    Electronically signed by Lincoln Wynne MD, 02/23/24, 5:16 PM EST.

## 2024-02-23 NOTE — NURSING NOTE
Patient awake, oriented to place, time, and self, disoriented to situation, opens eyes spontaneously, pupils 6mm reactive, NIH 1.  Scheduled ativan, Keppra added, Seizure precautions implemented.  Follow up CT head, abdomen, and pelvis done. Continuous EEG started. Plan for MRI.  CIWA score between 6 and 9. Following protocol.  Sinus tachycardia in the 140s-150s. EKG confirms ST this AM. Normotensive. Palpable pulses. No edema. SCDs on.  Room air.  Belly distended, normoactive bowel sounds. Need occult blood stool sample. Elevated LFTs. Labs per GI. Mesenteric duplex done.  Weak , dorsiflexion and plantarflexion moderate. Equal movement. Sat at edge of bed with PT. Good balance.  Warm, diaphoretic (see CIWA assessment), laceration/brusing/swelling to right eye and eyebrow.    0920 - converted from ST to SR. A+Ox4 at this time, pupils equal and reactive, neuromuscular unchanged, normotensive (107/82), Pulse regular.   1400 - Per coverage nurse, patient got out of bed, voided, removed monitor leads, removed EEG leads, and sat on the floor when staff arrived to room. Leads replaced. Patient cleaned up, returned to bed, educated on safety measures. EEG Tech notified. Per Dr. Sheth, do not replace EEG.

## 2024-02-23 NOTE — ED NOTES
This RN was in room three, pt collapsed outside door of room 3. This RN responded and noticed pt was unresponsive and appeared to be having a seizure. Pt was bleeding from what appeared to be a head wound above the eye. Stabilized head and rotated pt to his side with help from APRN. Staff assist was called and others responded to event.

## 2024-02-23 NOTE — PLAN OF CARE
Goal Outcome Evaluation:  Plan of Care Reviewed With: patient           Outcome Evaluation: 28yo thin black male admitted 2/22/24 after syncopal event and sz activity, SDH. Pt collapsed and fell while here. PMH includes etoh abuse. PLOF: Pt lives in apartment with friend. He was independent with ADLs and amb without AD. Pt is slow to respond to questions but is oriented to name and place. He is limited now by generalized weakness and decreased activity tolerance. Today, he was found resting in bed, heart rate 147. He req min A and vc to sit EOB and was slow to move. Pt able to sit EOB unsupported for >5 min. He performed seated LE ther ex with vc. Pt's heart rate increased to 158 and we were unable to attempt transfers or amb. Nurse present. Pt returned supine in bed with min-mod A of 2 and was dependent to reposition in bed. He would benefit from skilled PT services to address functional deficits and prepare for d/c.      Anticipated Discharge Disposition (PT): home with assist, home with home health, skilled nursing facility (pending progress)

## 2024-02-23 NOTE — DISCHARGE INSTRUCTIONS
You have been seen for seizure-like activity.  You will need to follow-up with your primary care and the referred neurologist.  Please do not drive, operate machinery, swim, bathe alone, or engage in any other dangerous activities until cleared to do so by either your primary care doctor or your neurologist.    You have been given emergency department evaluation.  This evaluation is intended to rule out life-threatening conditions.  Is not a complete evaluation.  You could require further testing as determined by your primary care physician or any referred specialist.  Please follow-up with all doctors that you are referred to.  Please be sure to take your prescribed medications and follow any specific instructions in the discharge instructions.  Please follow-up with your primary care physician within 48 hours.  Please have your primary care provider recheck your blood pressure.  Please return to the emergency department if you experience chest pain, shortness of breath, abdominal pain, fever greater than 102, intractable vomiting.  Please return to the emergency department if your symptoms continue or worsen, or if you begin to experience any other concerning symptom.    You have been seen for substance abuse related complaint.  Please stay with a responsible adult for 24 hours.  Please avoid driving for 24 hours.  Please quit taking alcohol/drugs, as it can result in your death or permanent disability.    Substance abuse treatment centers  The following list are some of the available alcohol and drug abuse treatment centers in the [facility] area.  This list is by no means inclusive of all available options, nor does it imply that these options are better than any other in the area.  Cost and available treatment plans to vary from site to site.  You will need to call, to see which if any of these options will work for your needs.    [facility]  [address]  [place] [address]  [phone]    [facility]  [address] [place] [address]  [phone]    [facility]  [address]  [facility] [address]    [facility] [place]  [address] [facility]  [phone]    [facility]-medical stabilization service  [address] [address]  [phone]    [facility], Addiction recovery  [facility]  [address] [place] [address]  [phone]  [facility] [place]  [address], [place] [address]  [phone]    [facility] [address] [id number] [phone]      Seizure Precautions:  ·    Do not drive 90 days. (this is to include: car, bicycle, or motorcycle)  ·    Do not drive operate dangerous equipment such as power tools, heavy machinery with moving parts, or an open flame.  ·    Do not swim alone (this would include a bathtub full of water is a small swimming pool).  ·    Avoid unsecured heights. (this is to include: scaffolding, ladders, trees, and roofs).

## 2024-02-23 NOTE — ED NOTES
..Nursing report ED to floor  Nida Peck  29 y.o.  male    HPI :   HPI (Adult)  Stated Reason for Visit: Pt arrived via [facility] from work. 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 Per witness, pt was unconscious for about 3-5 minutes. Pt denies any similar history. Pt A&O x4 during triage exam but states he feels lightheaded.    Chief Complaint  Chief Complaint   Patient presents with    Seizures       Admitting doctor:   Agus Baltazar MD    Admitting diagnosis:   The primary encounter diagnosis was Subdural hematoma. Diagnoses of Seizure-like activity, Hypomagnesemia, Hypokalemia, Elevated liver enzymes, Macrocytic anemia, Hyperglycemia, Thrombocytopenia, Laceration of forehead, initial encounter, and Subarachnoid bleed were also pertinent to this visit.    Code status:   Current Code Status       Date Active Code Status Order ID Comments User Context       2/23/2024 0123 CPR (Attempt to Resuscitate) [id number]  Rocio Pitts APRN ED        Question Answer    Code Status (Patient has no pulse and is not breathing) CPR (Attempt to Resuscitate)    Medical Interventions (Patient has pulse or is breathing) Full Support                    Allergies:   Penicillins and Sulfa antibiotics    Isolation:   No active isolations    Intake and Output    Intake/Output Summary (Last 24 hours) at 2/23/2024 0142  Last data filed at 2/22/2024 2312  Gross per 24 hour   Intake 1050 ml   Output --   Net 1050 ml       Weight:       02/22/24 1954   Weight: 74.8 kg (165 lb)       Most recent vitals:   Vitals:    02/23/24 0003 02/23/24 0023 02/23/24 0033 02/23/24 0103   BP: (!) 140/102 121/89 123/83 102/71   Pulse: (!) 142 (!) 134 (!) 130 (!) 141   Resp:       Temp:       TempSrc:       SpO2: 97% 96% 96% 97%   Weight:       Height:           Active LDAs/IV Access:   Lines, Drains & Airways       Active  LDAs       Name Placement date Placement time Site Days    Peripheral IV 02/22/24 2344 Anterior;Right Forearm 02/22/24 2344  Forearm  less than 1                    Labs (abnormal labs have a star):   Labs Reviewed   COMPREHENSIVE METABOLIC PANEL - Abnormal; Notable for the following components:       Result Value    Glucose 125 (*)     Potassium 3.2 (*)     Chloride 90 (*)     CO2 14.9 (*)     Calcium 8.5 (*)     ALT (SGPT) 78 (*)     AST (SGOT) 224 (*)     Alkaline Phosphatase 244 (*)     BUN/Creatinine Ratio 6.8 (*)     Anion Gap 32.1 (*)     All other components within normal limits    Narrative:     GFR Normal >60  Chronic Kidney Disease <60  Kidney Failure <15     URINALYSIS W/ MICROSCOPIC IF INDICATED (NO CULTURE) - Abnormal; Notable for the following components:    Color, UA Dark Yellow (*)     Appearance, UA Cloudy (*)     Ketones, UA 80 mg/dL (3+) (*)     Protein,  mg/dL (2+) (*)     Leuk Esterase, UA Small (1+) (*)     All other components within normal limits   MAGNESIUM - Abnormal; Notable for the following components:    Magnesium 1.2 (*)     All other components within normal limits   CBC WITH AUTO DIFFERENTIAL - Abnormal; Notable for the following components:    RBC 3.75 (*)     Hemoglobin 12.7 (*)     Hematocrit 36.6 (*)     MCV 97.6 (*)     MCH 33.9 (*)     Platelets 96 (*)     All other components within normal limits   BASIC METABOLIC PANEL - Abnormal; Notable for the following components:    Creatinine 0.70 (*)     Sodium 135 (*)     Potassium 3.2 (*)     Chloride 96 (*)     CO2 19.8 (*)     Calcium 7.9 (*)     Anion Gap 19.2 (*)     All other components within normal limits    Narrative:     GFR Normal >60  Chronic Kidney Disease <60  Kidney Failure <15     URINALYSIS, MICROSCOPIC ONLY - Abnormal; Notable for the following components:    WBC, UA Too Numerous to Count (*)     Squamous Epithelial Cells, UA 7-12 (*)     Trichomonas, UA Small/1+ (*)     All other components within normal  limits   URINE DRUG SCREEN - Normal    Narrative:     Negative Thresholds Per Drugs Screened:    Amphetamines                 500 ng/ml  Barbiturates                 200 ng/ml  Benzodiazepines              100 ng/ml  Cocaine                      300 ng/ml  Methadone                    300 ng/ml  Opiates                      300 ng/ml  Oxycodone                    100 ng/ml  THC                           50 ng/ml  Fentanyl                       5 ng/ml      The Normal Value for all drugs tested is negative. This report includes final unconfirmed screening results to be used for medical treatment purposes only. Unconfirmed results must not be used for non-medical purposes such as employment or legal testing. Clinical consideration should be applied to any drug of abuse test, particularly when unconfirmed results are used.           MAGNESIUM - Normal   ETHANOL   PROTIME-INR   APTT   HEMOGLOBIN A1C   LIPID PANEL   CBC (NO DIFF)   COMPREHENSIVE METABOLIC PANEL   PHOSPHORUS   AMMONIA   CK   POCT GLUCOSE FINGERSTICK   POCT GLUCOSE FINGERSTICK   POCT GLUCOSE FINGERSTICK   POCT GLUCOSE FINGERSTICK   POCT GLUCOSE FINGERSTICK   POCT GLUCOSE FINGERSTICK   POCT GLUCOSE FINGERSTICK   POCT GLUCOSE FINGERSTICK   CBC AND DIFFERENTIAL    Narrative:     The following orders were created for panel order CBC & Differential.  Procedure                               Abnormality         Status                     ---------                               -----------         ------                     CBC Auto Differential[[id number]]        Abnormal            Final result                 Please view results for these tests on the individual orders.       EKG:   ECG 12 Lead Syncope   Preliminary Result   HEART RATE= 99  bpm   RR Interval= 606  ms   PA Interval= 133  ms   P Horizontal Axis= 39  deg   P Front Axis= 88  deg   QRSD Interval= 92  ms   QT Interval= 351  ms   QTcB= 451  ms   QRS Axis= 87  deg   T Wave Axis= 77  deg   - NORMAL ECG -    Sinus rhythm   ST elev, probable normal early repol pattern   Electronically Signed By:    Date and Time of Study: 2024-02-22 20:47:11          Meds given in ED:   Medications   sodium chloride 0.9 % flush 10 mL (has no administration in time range)   sodium chloride 0.9 % flush 10 mL (has no administration in time range)   sodium chloride 0.9 % infusion 40 mL (has no administration in time range)   acetaminophen (TYLENOL) tablet 650 mg (has no administration in time range)     Or   acetaminophen (TYLENOL) suppository 650 mg (has no administration in time range)   niCARdipine (CARDENE) 25 mg in 250 mL NS infusion kit (has no administration in time range)   ondansetron (ZOFRAN) injection 4 mg (has no administration in time range)   bisacodyl (DULCOLAX) suppository 10 mg (has no administration in time range)   aluminum-magnesium hydroxide-simethicone (MAALOX MAX) 400-400-40 MG/5ML suspension 7.5 mL (has no administration in time range)   docusate sodium (COLACE) capsule 100 mg (has no administration in time range)   sodium chloride 0.9 % infusion (has no administration in time range)   Potassium Replacement - Follow Nurse / BPA Driven Protocol (has no administration in time range)   Magnesium Standard Dose Replacement - Follow Nurse / BPA Driven Protocol (has no administration in time range)   Phosphorus Replacement - Follow Nurse / BPA Driven Protocol (has no administration in time range)   Calcium Replacement - Follow Nurse / BPA Driven Protocol (has no administration in time range)   cloNIDine (CATAPRES) tablet 0.1 mg (has no administration in time range)   thiamine (B-1) injection 200 mg (has no administration in time range)     Followed by   thiamine (VITAMIN B-1) tablet 100 mg (has no administration in time range)   folic acid 1 mg in sodium chloride 0.9 % 50 mL IVPB (has no administration in time range)   LORazepam (ATIVAN) injection 2 mg (has no administration in time range)     Followed by   LORazepam  (ATIVAN) injection 1 mg (has no administration in time range)   LORazepam (ATIVAN) tablet 1 mg (has no administration in time range)     Or   LORazepam (ATIVAN) injection 1 mg (has no administration in time range)     Or   LORazepam (ATIVAN) tablet 2 mg (has no administration in time range)     Or   LORazepam (ATIVAN) injection 2 mg (has no administration in time range)     Or   LORazepam (ATIVAN) injection 2 mg (has no administration in time range)     Or   LORazepam (ATIVAN) injection 2 mg (has no administration in time range)     Or   LORazepam (ATIVAN) tablet 4 mg (has no administration in time range)     Or   LORazepam (ATIVAN) injection 4 mg (has no administration in time range)   sodium chloride 0.9 % bolus 1,000 mL (0 mL Intravenous Stopped 2/22/24 2117)   magnesium sulfate 2g/50 mL (PREMIX) infusion (0 g Intravenous Stopped 2/22/24 2312)   potassium chloride (K-DUR,KLOR-CON) ER tablet 40 mEq (40 mEq Oral Given 2/22/24 2118)   magnesium oxide (MAG-OX) tablet 800 mg (800 mg Oral Given 2/22/24 2118)   Tetanus-Diphth-Acell Pertussis (BOOSTRIX) injection 0.5 mL (0.5 mL Intramuscular Given 2/23/24 0027)   lidocaine 1% - EPINEPHrine 1:220175 (XYLOCAINE W/EPI) 1 %-1:820107 injection 10 mL (10 mL Injection Given 2/23/24 0124)   ondansetron (ZOFRAN) injection 4 mg (4 mg Intravenous Given 2/23/24 0017)   levETIRAcetam (KEPPRA) injection 1,000 mg (1,000 mg Intravenous Given 2/23/24 0110)       Imaging results:  CT Head Without Contrast    Result Date: 2/23/2024   1. Left cerebral convexity subdural hematoma with a maximum thickness of 1.1 cm. Midline shift of 6 mm. There is also trace subarachnoid hemorrhage, as well as trace left parafalcine and left tentorial hemorrhage. 2. No acute fracture or subluxation of the cervical spine is seen.  FINDINGS were discussed with Dr. Mckeon at 12:52 a.m.    Radiation dose reduction techniques were utilized, including automated exposure control and exposure modulation based on  body size.   This report was finalized on 2/23/2024 12:55 AM by Dr. Alexa Ferrer M.D on Workstation: [id number]      CT Cervical Spine Without Contrast    Result Date: 2/23/2024   1. Left cerebral convexity subdural hematoma with a maximum thickness of 1.1 cm. Midline shift of 6 mm. There is also trace subarachnoid hemorrhage, as well as trace left parafalcine and left tentorial hemorrhage. 2. No acute fracture or subluxation of the cervical spine is seen.  FINDINGS were discussed with Dr. Mckeon at 12:52 a.m.    Radiation dose reduction techniques were utilized, including automated exposure control and exposure modulation based on body size.   This report was finalized on 2/23/2024 12:55 AM by Dr. Alexa Ferrer M.D on Workstation: [id number]      CT Head Without Contrast    Result Date: 2/22/2024   No acute intracranial hemorrhage or hydrocephalus. Cyst of the septum pellucidum.  For further evaluation of the cause of patient's seizure, if indicated, enhanced MRI is advised.  This report was finalized on 2/22/2024 8:41 PM by Dr. Albert Moran M.D on Workstation: [id number]       Ambulatory status:   - with assist    Social issues:   Social History     Socioeconomic History    Marital status: Single       NIH Stroke Scale:       Shaw Oneal RN  02/23/24 01:42 EST

## 2024-02-23 NOTE — NURSING NOTE
RN called and spoke with both active companions listed. Mother to visit patient today. Patient is drowsy, but opens eyes to voice/light touch. Always oriented to self upon assessment. Systolic BP maintained <140.

## 2024-02-23 NOTE — ED PROVIDER NOTES
Laceration Repair    Date/Time: 2/23/2024 2:06 AM    Performed by: Paula Burns APRN  Authorized by: Gabriel Mckeon MD    Consent:     Consent obtained:  Verbal    Consent given by:  Patient    Risks, benefits, and alternatives were discussed: yes      Risks discussed:  Infection, pain, retained foreign body, need for additional repair, poor cosmetic result, tendon damage, vascular damage, poor wound healing and nerve damage    Alternatives discussed:  No treatment, delayed treatment, observation and referral  Universal protocol:     Procedure explained and questions answered to patient or proxy's satisfaction: yes      Patient identity confirmed:  Verbally with patient  Anesthesia:     Anesthesia method:  Local infiltration    Local anesthetic:  Lidocaine 1% WITH epi  Laceration details:     Location:  Face    Face location:  L eyebrow    Length (cm):  15  Pre-procedure details:     Preparation:  Patient was prepped and draped in usual sterile fashion and imaging obtained to evaluate for foreign bodies  Exploration:     Hemostasis achieved with:  Epinephrine and direct pressure    Imaging obtained comment:  Ct    Imaging outcome: foreign body not noted      Wound exploration: wound explored through full range of motion and entire depth of wound visualized      Wound extent: no areolar tissue violation noted, no fascia violation noted, no foreign bodies/material noted, no muscle damage noted, no nerve damage noted, no tendon damage noted, no underlying fracture noted and no vascular damage noted    Treatment:     Area cleansed with:  Povidone-iodine    Amount of cleaning:  Extensive    Irrigation solution:  Sterile saline    Irrigation volume:  1000ml    Irrigation method:  Syringe    Debridement:  None    Undermining:  None  Skin repair:     Repair method:  Sutures    Suture size:  5-0    Suture material:  Prolene    Suture technique:  Simple interrupted    Number of sutures:  13  Approximation:      Approximation:  Close  Repair type:     Repair type:  Intermediate  Post-procedure details:     Dressing:  Antibiotic ointment    Procedure completion:  Tolerated         Paula Burns, KUNAL  02/23/24 0204

## 2024-02-24 ENCOUNTER — APPOINTMENT (OUTPATIENT)
Dept: CT IMAGING | Facility: HOSPITAL | Age: 29
End: 2024-02-24
Payer: COMMERCIAL

## 2024-02-24 ENCOUNTER — INPATIENT HOSPITAL (OUTPATIENT)
Dept: URBAN - METROPOLITAN AREA HOSPITAL 113 | Facility: HOSPITAL | Age: 29
End: 2024-02-24
Payer: COMMERCIAL

## 2024-02-24 DIAGNOSIS — F10.90 ALCOHOL USE, UNSPECIFIED, UNCOMPLICATED: ICD-10-CM

## 2024-02-24 DIAGNOSIS — R94.5 ABNORMAL RESULTS OF LIVER FUNCTION STUDIES: ICD-10-CM

## 2024-02-24 DIAGNOSIS — K70.10 ALCOHOLIC HEPATITIS WITHOUT ASCITES: ICD-10-CM

## 2024-02-24 LAB
ALBUMIN SERPL-MCNC: 3.6 G/DL (ref 3.5–5.2)
ALBUMIN/GLOB SERPL: 1.6 G/DL
ALP SERPL-CCNC: 199 U/L (ref 39–117)
ALT SERPL W P-5'-P-CCNC: 71 U/L (ref 1–41)
ANION GAP SERPL CALCULATED.3IONS-SCNC: 16.5 MMOL/L (ref 5–15)
AST SERPL-CCNC: 284 U/L (ref 1–40)
BILIRUB SERPL-MCNC: 1.1 MG/DL (ref 0–1.2)
BUN SERPL-MCNC: 2 MG/DL (ref 6–20)
BUN/CREAT SERPL: 2.3 (ref 7–25)
CALCIUM SPEC-SCNC: 7.5 MG/DL (ref 8.6–10.5)
CHLORIDE SERPL-SCNC: 100 MMOL/L (ref 98–107)
CO2 SERPL-SCNC: 16.5 MMOL/L (ref 22–29)
CREAT SERPL-MCNC: 0.87 MG/DL (ref 0.76–1.27)
DEPRECATED RDW RBC AUTO: 53.9 FL (ref 37–54)
EGFRCR SERPLBLD CKD-EPI 2021: 118.3 ML/MIN/1.73
ERYTHROCYTE [DISTWIDTH] IN BLOOD BY AUTOMATED COUNT: 15.4 % (ref 12.3–15.4)
GLOBULIN UR ELPH-MCNC: 2.3 GM/DL
GLUCOSE BLDC GLUCOMTR-MCNC: 105 MG/DL (ref 70–130)
GLUCOSE BLDC GLUCOMTR-MCNC: 117 MG/DL (ref 70–130)
GLUCOSE BLDC GLUCOMTR-MCNC: 99 MG/DL (ref 70–130)
GLUCOSE SERPL-MCNC: 104 MG/DL (ref 65–99)
HCT VFR BLD AUTO: 28.8 % (ref 37.5–51)
HGB BLD-MCNC: 10 G/DL (ref 13–17.7)
MCH RBC QN AUTO: 33.9 PG (ref 26.6–33)
MCHC RBC AUTO-ENTMCNC: 34.7 G/DL (ref 31.5–35.7)
MCV RBC AUTO: 97.6 FL (ref 79–97)
PHOSPHATE SERPL-MCNC: 1.7 MG/DL (ref 2.5–4.5)
PLATELET # BLD AUTO: 68 10*3/MM3 (ref 140–450)
PMV BLD AUTO: 11.4 FL (ref 6–12)
POTASSIUM SERPL-SCNC: 3.7 MMOL/L (ref 3.5–5.2)
PROCALCITONIN SERPL-MCNC: 0.42 NG/ML (ref 0–0.25)
PROT SERPL-MCNC: 5.9 G/DL (ref 6–8.5)
RBC # BLD AUTO: 2.95 10*6/MM3 (ref 4.14–5.8)
SODIUM SERPL-SCNC: 133 MMOL/L (ref 136–145)
WBC NRBC COR # BLD AUTO: 5.32 10*3/MM3 (ref 3.4–10.8)

## 2024-02-24 PROCEDURE — 25010000002 THIAMINE HCL 200 MG/2ML SOLUTION: Performed by: INTERNAL MEDICINE

## 2024-02-24 PROCEDURE — 97530 THERAPEUTIC ACTIVITIES: CPT

## 2024-02-24 PROCEDURE — 82948 REAGENT STRIP/BLOOD GLUCOSE: CPT

## 2024-02-24 PROCEDURE — 70450 CT HEAD/BRAIN W/O DYE: CPT

## 2024-02-24 PROCEDURE — 99231 SBSQ HOSP IP/OBS SF/LOW 25: CPT | Performed by: NEUROLOGICAL SURGERY

## 2024-02-24 PROCEDURE — 25810000003 DEXTROSE-NACL PER 500 ML: Performed by: INTERNAL MEDICINE

## 2024-02-24 PROCEDURE — 84100 ASSAY OF PHOSPHORUS: CPT | Performed by: INTERNAL MEDICINE

## 2024-02-24 PROCEDURE — 80053 COMPREHEN METABOLIC PANEL: CPT

## 2024-02-24 PROCEDURE — 99233 SBSQ HOSP IP/OBS HIGH 50: CPT | Performed by: NURSE PRACTITIONER

## 2024-02-24 PROCEDURE — 84145 PROCALCITONIN (PCT): CPT | Performed by: INTERNAL MEDICINE

## 2024-02-24 PROCEDURE — 25810000003 SODIUM CHLORIDE 0.9 % SOLUTION: Performed by: INTERNAL MEDICINE

## 2024-02-24 PROCEDURE — 99233 SBSQ HOSP IP/OBS HIGH 50: CPT | Performed by: STUDENT IN AN ORGANIZED HEALTH CARE EDUCATION/TRAINING PROGRAM

## 2024-02-24 PROCEDURE — 25010000002 LEVETRIRACETAM PER 10 MG: Performed by: STUDENT IN AN ORGANIZED HEALTH CARE EDUCATION/TRAINING PROGRAM

## 2024-02-24 PROCEDURE — 85027 COMPLETE CBC AUTOMATED: CPT

## 2024-02-24 PROCEDURE — 25010000002 LORAZEPAM PER 2 MG: Performed by: INTERNAL MEDICINE

## 2024-02-24 RX ORDER — FENTANYL/ROPIVACAINE/NS/PF 2-625MCG/1
15 PLASTIC BAG, INJECTION (ML) EPIDURAL ONCE
Status: COMPLETED | OUTPATIENT
Start: 2024-02-24 | End: 2024-02-24

## 2024-02-24 RX ORDER — DEXMEDETOMIDINE HYDROCHLORIDE 4 UG/ML
.2-1.5 INJECTION, SOLUTION INTRAVENOUS
Status: DISCONTINUED | OUTPATIENT
Start: 2024-02-25 | End: 2024-02-27

## 2024-02-24 RX ADMIN — LORAZEPAM 2 MG: 2 INJECTION INTRAMUSCULAR; INTRAVENOUS at 23:30

## 2024-02-24 RX ADMIN — LORAZEPAM 2 MG: 2 INJECTION INTRAMUSCULAR; INTRAVENOUS at 21:11

## 2024-02-24 RX ADMIN — THIAMINE HYDROCHLORIDE 200 MG: 100 INJECTION, SOLUTION INTRAMUSCULAR; INTRAVENOUS at 05:25

## 2024-02-24 RX ADMIN — FOLIC ACID 1 MG: 5 INJECTION, SOLUTION INTRAMUSCULAR; INTRAVENOUS; SUBCUTANEOUS at 13:25

## 2024-02-24 RX ADMIN — THIAMINE HYDROCHLORIDE 200 MG: 100 INJECTION, SOLUTION INTRAMUSCULAR; INTRAVENOUS at 13:29

## 2024-02-24 RX ADMIN — LORAZEPAM 2 MG: 2 INJECTION INTRAMUSCULAR; INTRAVENOUS at 05:34

## 2024-02-24 RX ADMIN — LEVETIRACETAM 500 MG: 500 INJECTION, SOLUTION INTRAVENOUS at 20:31

## 2024-02-24 RX ADMIN — THIAMINE HYDROCHLORIDE 200 MG: 100 INJECTION, SOLUTION INTRAMUSCULAR; INTRAVENOUS at 21:11

## 2024-02-24 RX ADMIN — LORAZEPAM 1 MG: 2 INJECTION INTRAMUSCULAR; INTRAVENOUS at 09:07

## 2024-02-24 RX ADMIN — Medication 10 ML: at 20:20

## 2024-02-24 RX ADMIN — DEXTROSE AND SODIUM CHLORIDE 100 ML/HR: 5; 900 INJECTION, SOLUTION INTRAVENOUS at 04:04

## 2024-02-24 RX ADMIN — LORAZEPAM 2 MG: 2 INJECTION INTRAMUSCULAR; INTRAVENOUS at 22:50

## 2024-02-24 RX ADMIN — PANTOPRAZOLE SODIUM 40 MG: 40 INJECTION, POWDER, FOR SOLUTION INTRAVENOUS at 05:25

## 2024-02-24 RX ADMIN — LEVETIRACETAM 500 MG: 500 INJECTION, SOLUTION INTRAVENOUS at 09:07

## 2024-02-24 RX ADMIN — LORAZEPAM 1 MG: 2 INJECTION INTRAMUSCULAR; INTRAVENOUS at 13:30

## 2024-02-24 RX ADMIN — Medication 10 ML: at 09:08

## 2024-02-24 RX ADMIN — POTASSIUM PHOSPHATE, MONOBASIC POTASSIUM PHOSPHATE, DIBASIC 15 MMOL: 224; 236 INJECTION, SOLUTION, CONCENTRATE INTRAVENOUS at 09:07

## 2024-02-24 RX ADMIN — DEXTROSE AND SODIUM CHLORIDE 100 ML/HR: 5; 900 INJECTION, SOLUTION INTRAVENOUS at 16:30

## 2024-02-24 RX ADMIN — LORAZEPAM 1 MG: 2 INJECTION INTRAMUSCULAR; INTRAVENOUS at 20:20

## 2024-02-24 RX ADMIN — METOPROLOL TARTRATE 12.5 MG: 25 TABLET, FILM COATED ORAL at 09:06

## 2024-02-24 RX ADMIN — METOPROLOL TARTRATE 12.5 MG: 25 TABLET, FILM COATED ORAL at 20:20

## 2024-02-24 RX ADMIN — LORAZEPAM 2 MG: 2 INJECTION INTRAMUSCULAR; INTRAVENOUS at 22:26

## 2024-02-24 RX ADMIN — LORAZEPAM 1 MG: 2 INJECTION INTRAMUSCULAR; INTRAVENOUS at 02:46

## 2024-02-24 RX ADMIN — LORAZEPAM 2 MG: 2 INJECTION INTRAMUSCULAR; INTRAVENOUS at 21:53

## 2024-02-24 RX ADMIN — LORAZEPAM 2 MG: 2 INJECTION INTRAMUSCULAR; INTRAVENOUS at 04:28

## 2024-02-24 NOTE — PLAN OF CARE
Problem: Adult Inpatient Plan of Care  Goal: Plan of Care Review  Outcome: Ongoing, Progressing  Goal: Patient-Specific Goal (Individualized)  Outcome: Ongoing, Progressing  Goal: Absence of Hospital-Acquired Illness or Injury  Outcome: Ongoing, Progressing  Intervention: Identify and Manage Fall Risk  Recent Flowsheet Documentation  Taken 2/24/2024 0000 by Eduardo Eugene RN  Safety Promotion/Fall Prevention:   clutter free environment maintained   fall prevention program maintained   lighting adjusted   safety round/check completed  Taken 2/23/2024 2300 by Eduardo Eguene RN  Safety Promotion/Fall Prevention:   clutter free environment maintained   fall prevention program maintained   lighting adjusted   safety round/check completed  Taken 2/23/2024 2200 by Eduardo Eugene RN  Safety Promotion/Fall Prevention:   clutter free environment maintained   fall prevention program maintained   lighting adjusted   safety round/check completed  Taken 2/23/2024 2100 by Eduardo Eugene RN  Safety Promotion/Fall Prevention:   clutter free environment maintained   fall prevention program maintained   lighting adjusted   safety round/check completed  Taken 2/23/2024 2000 by Eduardo Eugene RN  Safety Promotion/Fall Prevention:   clutter free environment maintained   fall prevention program maintained   lighting adjusted   safety round/check completed  Intervention: Prevent Skin Injury  Recent Flowsheet Documentation  Taken 2/24/2024 0000 by Eduardo Eugene RN  Body Position: position changed independently  Taken 2/23/2024 2200 by Eduardo Eugene RN  Body Position: position changed independently  Taken 2/23/2024 2000 by Eduardo Eugene RN  Body Position: position changed independently  Intervention: Prevent and Manage VTE (Venous Thromboembolism) Risk  Recent Flowsheet Documentation  Taken 2/23/2024 2000 by Eduardo Eugene RN  VTE Prevention/Management:   bilateral   sequential compression devices on  Range of  Motion: active ROM (range of motion) encouraged  Intervention: Prevent Infection  Recent Flowsheet Documentation  Taken 2/24/2024 0000 by Eduardo Eugene RN  Infection Prevention:   environmental surveillance performed   personal protective equipment utilized   rest/sleep promoted   single patient room provided  Taken 2/23/2024 2300 by Eduardo Eugene RN  Infection Prevention:   environmental surveillance performed   personal protective equipment utilized   rest/sleep promoted   single patient room provided  Taken 2/23/2024 2200 by Eduardo Eugene RN  Infection Prevention:   environmental surveillance performed   personal protective equipment utilized   rest/sleep promoted   single patient room provided  Taken 2/23/2024 2100 by Eduardo Eugene RN  Infection Prevention:   environmental surveillance performed   personal protective equipment utilized   rest/sleep promoted   single patient room provided  Taken 2/23/2024 2000 by Eduardo Eugene RN  Infection Prevention:   environmental surveillance performed   personal protective equipment utilized   rest/sleep promoted   single patient room provided  Goal: Optimal Comfort and Wellbeing  Outcome: Ongoing, Progressing  Intervention: Monitor Pain and Promote Comfort  Recent Flowsheet Documentation  Taken 2/24/2024 0000 by Eduardo Eugene RN  Pain Management Interventions:   pain management plan reviewed with patient/caregiver   pillow support provided   position adjusted   quiet environment facilitated   relaxation techniques promoted   unnecessary movement minimized  Taken 2/23/2024 2300 by Eduardo Eugene RN  Pain Management Interventions:   pain management plan reviewed with patient/caregiver   pillow support provided   position adjusted   quiet environment facilitated   relaxation techniques promoted   unnecessary movement minimized  Taken 2/23/2024 2200 by Eduardo Eugene RN  Pain Management Interventions:   pain management plan reviewed with  patient/caregiver   pillow support provided   position adjusted   quiet environment facilitated   relaxation techniques promoted   unnecessary movement minimized  Taken 2/23/2024 2100 by Eduardo Eugene RN  Pain Management Interventions:   pain management plan reviewed with patient/caregiver   pillow support provided   position adjusted   quiet environment facilitated   relaxation techniques promoted   unnecessary movement minimized  Taken 2/23/2024 2000 by Eduardo Eugene RN  Pain Management Interventions:   pain management plan reviewed with patient/caregiver   pillow support provided   position adjusted   quiet environment facilitated   relaxation techniques promoted   unnecessary movement minimized  Intervention: Provide Person-Centered Care  Recent Flowsheet Documentation  Taken 2/23/2024 2000 by Eduardo Eugene RN  Trust Relationship/Rapport:   care explained   choices provided   emotional support provided   empathic listening provided   questions answered   questions encouraged   reassurance provided   thoughts/feelings acknowledged  Goal: Readiness for Transition of Care  Outcome: Ongoing, Progressing     Problem: Skin Injury Risk Increased  Goal: Skin Health and Integrity  Outcome: Ongoing, Progressing  Intervention: Optimize Skin Protection  Recent Flowsheet Documentation  Taken 2/23/2024 2000 by Eduardo Eugene RN  Head of Bed (HOB) Positioning: HOB at 20-30 degrees     Problem: Fall Injury Risk  Goal: Absence of Fall and Fall-Related Injury  Outcome: Ongoing, Progressing  Intervention: Identify and Manage Contributors  Recent Flowsheet Documentation  Taken 2/24/2024 0000 by Eduardo Eugene RN  Medication Review/Management: medications reviewed  Taken 2/23/2024 2300 by Eduardo Eugene RN  Medication Review/Management: medications reviewed  Taken 2/23/2024 2200 by Eduardo Eugene RN  Medication Review/Management: medications reviewed  Taken 2/23/2024 2100 by Eduardo Eugene RN  Medication  Review/Management: medications reviewed  Taken 2/23/2024 2000 by Eduardo Eugene RN  Medication Review/Management: medications reviewed  Self-Care Promotion:   independence encouraged   meal set-up provided  Intervention: Promote Injury-Free Environment  Recent Flowsheet Documentation  Taken 2/24/2024 0000 by Eduardo Eugene RN  Safety Promotion/Fall Prevention:   clutter free environment maintained   fall prevention program maintained   lighting adjusted   safety round/check completed  Taken 2/23/2024 2300 by Eduardo Eugene RN  Safety Promotion/Fall Prevention:   clutter free environment maintained   fall prevention program maintained   lighting adjusted   safety round/check completed  Taken 2/23/2024 2200 by Eduardo Eugene RN  Safety Promotion/Fall Prevention:   clutter free environment maintained   fall prevention program maintained   lighting adjusted   safety round/check completed  Taken 2/23/2024 2100 by Eduardo Eugene RN  Safety Promotion/Fall Prevention:   clutter free environment maintained   fall prevention program maintained   lighting adjusted   safety round/check completed  Taken 2/23/2024 2000 by Eduardo Eugene RN  Safety Promotion/Fall Prevention:   clutter free environment maintained   fall prevention program maintained   lighting adjusted   safety round/check completed   Goal Outcome Evaluation:

## 2024-02-24 NOTE — PROGRESS NOTES
LOS: 1 day   Patient Care Team:  Provider, No Known as PCP - General    Chief Complaint: Subdural hematoma    Subjective     This patient had a seizure on the day of admission and hit his head.  This caused a an acute subdural hematoma over the left cerebral hemisphere.  He is currently feeling fairly good.  He does still have some headache but it is improved.    Interval History:     History taken from: patient chart    Objective      He has good movement in all 4 extremities and is awake and alert.    Vital Signs  Temp:  [97.9 °F (36.6 °C)-99.3 °F (37.4 °C)] 99.3 °F (37.4 °C)  Heart Rate:  [] 101  Resp:  [12-16] 15  BP: ()/() 122/86       Results Review:     I reviewed the patient's new clinical results.  His CT this morning shows some improvement in the subdural hematoma.  I think it is beginning to washout.      Assessment & Plan       Subdural hematoma      I told the patient that from my point of view he can begin mobilizing in his room.  Will plan to scan him again tomorrow.      Je Celeste MD  02/24/24  07:50 EST

## 2024-02-24 NOTE — THERAPY TREATMENT NOTE
Patient Name: Nida Peck  : 1995    MRN: [id number]                              Today's Date: 2024       Admit Date: 2024    Visit Dx:     ICD-10-CM ICD-9-CM   1. Subdural hematoma  S06.5XAA 432.1   2. Seizure-like activity  R56.9 780.39   3. Hypomagnesemia  E83.42 275.2   4. Hypokalemia  E87.6 276.8   5. Elevated liver enzymes  R74.8 790.5   6. Macrocytic anemia  D53.9 281.9   7. Hyperglycemia  R73.9 790.29   8. Thrombocytopenia  D69.6 287.5   9. Laceration of forehead, initial encounter  S01.81XA 873.42   10. Subarachnoid bleed  I60.9 430     Patient Active Problem List   Diagnosis    Subdural hematoma     History reviewed. No pertinent past medical history.  History reviewed. No pertinent surgical history.   General Information       Row Name 24 1458          Physical Therapy Time and Intention    Document Type therapy note (daily note)  -     Mode of Treatment individual therapy;physical therapy  -       Row Name 24 1458          General Information    Patient Profile Reviewed yes  -     Existing Precautions/Restrictions fall  -       Row Name 24 1458          Cognition    Orientation Status (Cognition) oriented to;person;unable/difficult to assess  Patient slow to respond to questions. Difficult to unsteady most of the time  -       Row Name 24 1458          Safety Issues, Functional Mobility    Safety Issues Affecting Function (Mobility) judgment;problem-solving;awareness of need for assistance;insight into deficits/self-awareness;sequencing abilities  -     Impairments Affecting Function (Mobility) balance;strength;endurance/activity tolerance;postural/trunk control  -               User Key  (r) = Recorded By, (t) = Taken By, (c) = Cosigned By      Initials Name Provider Type     Lisa Tyson PT Physical Therapist                   Mobility       Row Name 24 1459          Bed Mobility    Bed Mobility supine-sit;sit-supine  -      Supine-Sit [place] (Bed Mobility) minimum assist (75% patient effort);verbal cues  -     Sit-Supine [place] (Bed Mobility) minimum assist (75% patient effort);verbal cues  -     Assistive Device (Bed Mobility) bed rails;head of bed elevated  -     Comment, (Bed Mobility) Increased time. Dependent x2 to scoot up in bed  -       Row Name 02/24/24 1459          Sit-Stand Transfer    Sit-Stand [place] (Transfers) minimum assist (75% patient effort);2 person assist;verbal cues  -       Row Name 02/24/24 1459          Gait/Stairs (Locomotion)    [place] Level (Gait) minimum assist (75% patient effort);moderate assist (50% patient effort);2 person assist  -     Distance in Feet (Gait) a few side steps towards HOB.  -     Deviations/Abnormal Patterns (Gait) ataxic;base of support, wide  -     Comment, (Gait/Stairs) Patient took a few very unsteady ataxic steps towards HOB with Madiha-modAx2.  -               User Key  (r) = Recorded By, (t) = Taken By, (c) = Cosigned By      Initials Name Provider Type     Lisa Tyson PT Physical Therapist                   Obj/Interventions       Row Name 02/24/24 1501          Balance    Balance Assessment sitting static balance;sitting dynamic balance;sit to stand dynamic balance;standing static balance;standing dynamic balance  -     Static Sitting Balance contact guard;minimal assist  -     Dynamic Sitting Balance minimal assist;contact guard  -     Position, Sitting Balance sitting edge of bed  -     Sit to Stand Dynamic Balance minimal assist;moderate assist;2-person assist  -     Static Standing Balance minimal assist;2-person assist  -     Dynamic Standing Balance moderate assist;minimal assist;2-person assist;verbal cues  -     Balance Interventions sitting;standing;sit to stand;static;dynamic  -     Comment, Balance Patient would lose trunk control randomly in various directions with no pattern while seated at EOB  -                User Key  (r) = Recorded By, (t) = Taken By, (c) = Cosigned By      Initials Name Provider Type    SM Lisa Tyson, PT Physical Therapist                   Goals/Plan    No documentation.                  Clinical Impression       Row Name 02/24/24 1502          Plan of Care Review    Plan of Care Reviewed With patient  -SM     Outcome Evaluation Patient seen for PT session this PM. Patient supine in bed upon arrival. Patient appeared very drowsy and difficult to arrouse initially. Patient needing to be cleaned up. With encouragement patient able to sit up to EOB with Madiha. Patient sat at EOB for several minutes with Madiha-modA needed at times for sitting balance as patient losing his static sitting balance in all directions without pattern. Patient appeared AO to self only. Patient required increased time to respond to questions and follow commands. Difficulty understanding patient majority of the time as patient appeared to be mumbling. Patient required Madiha-modAx2 to to perform STS from EOB. Patient very impulsive in standing. Patient took a few very unsteady ataxic steps towards HOB with Maidha-modAx2. While in standing patient began urinating on himself and the floor though patient did not appear aware of the situation. Assisted aide with getting patient back to bed and changed. Patient would continue to benefit from skilled PT intervention to address deficits in functional mobility and maximize safety and independence. PT will continue to monitor.  -SM       Row Name 02/24/24 1502          Vital Signs    Pre Patient Position Supine  -SM     Intra Patient Position Standing  -SM     Post Patient Position Supine  -SM       Row Name 02/24/24 1502          Positioning and Restraints    Pre-Treatment Position in bed  -SM     Post Treatment Position bed  -SM     In Bed notified nsg;call light within reach;encouraged to call for assist;exit alarm on;fowlers  -               User Key  (r) = Recorded  By, (t) = Taken By, (c) = Cosigned By      Initials Name Provider Type    Lisa Stoll PT Physical Therapist                   Outcome Measures       Row Name 02/24/24 1513 02/24/24 0800       How much help from another person do you currently need...    Turning from your back to your side while in flat bed without using bedrails? 3  -SM 3  -BL    Moving from lying on back to sitting on the side of a flat bed without bedrails? 3  -SM 3  -BL    Moving to and from a bed to a chair (including a wheelchair)? 2  -SM 2  -BL    Standing up from a chair using your arms (e.g., wheelchair, bedside chair)? 2  -SM 2  -BL    Climbing 3-5 steps with a railing? 1  -SM 2  -BL    To walk in hospital room? 1  -SM 2  -BL    AM-PAC 6 Clicks Score (PT) 12  -SM 14  -BL    Highest Level of Mobility Goal 4 --> Transfer to chair/commode  -SM 4 --> Transfer to chair/commode  -BL      Row Name 02/24/24 1513          Functional Assessment    Outcome Measure Options AM-PAC 6 Clicks Basic Mobility (PT)  -               User Key  (r) = Recorded By, (t) = Taken By, (c) = Cosigned By      Initials Name Provider Type    Thaddeus Martines, RN Registered Nurse    Lisa Stoll PT Physical Therapist                                 Physical Therapy Education       Title: PT OT SLP Therapies (In Progress)       Topic: Physical Therapy (In Progress)       Point: Mobility training (In Progress)       Learning Progress Summary             Patient Acceptance, E, NR by NASIMA at 2/24/2024 1514                         Point: Home exercise program (In Progress)       Learning Progress Summary             Patient Acceptance, E, NR by JOVANNI at 2/23/2024 1030                         Point: Body mechanics (In Progress)       Learning Progress Summary             Patient Acceptance, E, NR by NASIMA at 2/24/2024 1514    Acceptance, E, NR by JOVANNI at 2/23/2024 1030                         Point: Precautions (In Progress)       Learning Progress Summary              Patient Acceptance, E, NR by  at 2/24/2024 1514    Acceptance, E, NR by  at 2/23/2024 1030                                         User Key       Initials Effective Dates Name Provider Type Discipline    JOVANNI 10/25/19 -  Saida Shen, PT Physical Therapist PT    NASIMA 05/02/22 -  Lisa Tyson, MAGDALENO Physical Therapist PT                  PT Recommendation and Plan     Plan of Care Reviewed With: patient  Outcome Evaluation: Patient seen for PT session this PM. Patient supine in bed upon arrival. Patient appeared very drowsy and difficult to arrouse initially. Patient needing to be cleaned up. With encouragement patient able to sit up to EOB with Madiha. Patient sat at EOB for several minutes with Madiha-modA needed at times for sitting balance as patient losing his static sitting balance in all directions without pattern. Patient appeared AO to self only. Patient required increased time to respond to questions and follow commands. Difficulty understanding patient majority of the time as patient appeared to be mumbling. Patient required Madiha-modAx2 to to perform STS from EOB. Patient very impulsive in standing. Patient took a few very unsteady ataxic steps towards HOB with Madiha-modAx2. While in standing patient began urinating on himself and the floor though patient did not appear aware of the situation. Assisted aide with getting patient back to bed and changed. Patient would continue to benefit from skilled PT intervention to address deficits in functional mobility and maximize safety and independence. PT will continue to monitor.     Time Calculation:         PT Charges       Row Name 02/24/24 1514             Time Calculation    Start Time 1414  -      Stop Time 1432  -      Time Calculation (min) 18 min  -      PT Received On 02/24/24  -      PT - Next Appointment 02/26/24  -         Time Calculation- PT    Total Timed Code Minutes- PT 18 minute(s)  -         Timed Charges    56346 - PT Therapeutic  Activity Minutes 18  -SM         Total Minutes    Timed Charges Total Minutes 18  -SM       Total Minutes 18  -SM                User Key  (r) = Recorded By, (t) = Taken By, (c) = Cosigned By      Initials Name Provider Type    Lisa Stoll PT Physical Therapist                  Therapy Charges for Today       Code Description Service Date Service Provider Modifiers Qty    [id number]  PT THERAPEUTIC ACT EA 15 MIN 2/24/2024 Lisa Tyson, PT GP 1    [id number]  PT THER SUPP EA 15 MIN 2/24/2024 Lisa Tyson, PT GP 1            PT G-Codes  Outcome Measure Options: AM-PAC 6 Clicks Basic Mobility (PT)  AM-PAC 6 Clicks Score (PT): 12  AM-PAC 6 Clicks Score (OT): 15  Modified [place] Scale: 4 - Moderately severe disability.  Unable to walk without assistance, and unable to attend to own bodily needs without assistance.       Lisa Tyson PT  2/24/2024

## 2024-02-24 NOTE — PROGRESS NOTES
LOS: 1 day   Patient Care Team:  Provider, No Known as PCP - General      Subjective     Interval History: No new events    Subjective: Patient is resting comfortably in bed.  Facial swelling noted.  No new GI complaints reported.  Labs reviewed, LFTs improving.      ROS:   Review of Systems   All other systems reviewed and are negative.         Medication Review:     Current Facility-Administered Medications:     acetaminophen (TYLENOL) tablet 650 mg, 650 mg, Oral, Q4H PRN **OR** acetaminophen (TYLENOL) suppository 650 mg, 650 mg, Rectal, Q4H PRN, Rocio Pitts APRN    aluminum-magnesium hydroxide-simethicone (MAALOX MAX) 400-400-40 MG/5ML suspension 7.5 mL, 7.5 mL, Oral, Q4H PRN, Rocio Pitts APRN    bisacodyl (DULCOLAX) suppository 10 mg, 10 mg, Rectal, Daily PRN, Rocio Pitts APRN    Calcium Replacement - Follow Nurse / BPA Driven Protocol, , Does not apply, PRN, Rocio Pitts APRN    cloNIDine (CATAPRES) tablet 0.1 mg, 0.1 mg, Oral, Q4H PRN, Agus Baltazar MD    dextrose 5 % and sodium chloride 0.9 % infusion, 100 mL/hr, Intravenous, Continuous, Lincoln Wynne MD, Last Rate: 100 mL/hr at 02/24/24 0404, 100 mL/hr at 02/24/24 0404    docusate sodium (COLACE) capsule 100 mg, 100 mg, Oral, Daily, Rocio Pitts APRN, 100 mg at 02/23/24 0748    folic acid 1 mg in sodium chloride 0.9 % 50 mL IVPB, 1 mg, Intravenous, Daily, Agus Baltazar MD, Last Rate: 100 mL/hr at 02/23/24 0912, 1 mg at 02/23/24 0912    influenza vac split quad (FLUZONE,FLUARIX,AFLURIA,FLULAVAL) injection 0.5 mL, 0.5 mL, Intramuscular, During Hospitalization, Rocio Pitts APRN    levETIRAcetam (KEPPRA) injection 500 mg, 500 mg, Intravenous, Q12H, Wilbur Sheth MD, 500 mg at 02/24/24 0907    [COMPLETED] LORazepam (ATIVAN) injection 2 mg, 2 mg, Intravenous, Q6H, 2 mg at 02/23/24 2045 **FOLLOWED BY** LORazepam (ATIVAN) injection 1 mg, 1 mg, Intravenous, Q6H, Agus Baltazar MD, 1 mg at 02/24/24 0907     LORazepam (ATIVAN) tablet 1 mg, 1 mg, Oral, Q1H PRN **OR** LORazepam (ATIVAN) injection 1 mg, 1 mg, Intravenous, Q1H PRN **OR** LORazepam (ATIVAN) tablet 2 mg, 2 mg, Oral, Q1H PRN **OR** LORazepam (ATIVAN) injection 2 mg, 2 mg, Intravenous, Q1H PRN, 2 mg at 02/24/24 0534 **OR** LORazepam (ATIVAN) injection 2 mg, 2 mg, Intravenous, Q15 Min PRN **OR** LORazepam (ATIVAN) injection 2 mg, 2 mg, Intramuscular, Q15 Min PRN **OR** LORazepam (ATIVAN) tablet 4 mg, 4 mg, Oral, Q1H PRN **OR** LORazepam (ATIVAN) injection 4 mg, 4 mg, Intravenous, Q1H PRN, Agus Baltazar MD    Magnesium Standard Dose Replacement - Follow Nurse / BPA Driven Protocol, , Does not apply, PRN, Rocio Pitts APRN    metoprolol tartrate (LOPRESSOR) tablet 12.5 mg, 12.5 mg, Oral, Q12H, Lincoln Wynne MD, 12.5 mg at 02/24/24 0906    niCARdipine (CARDENE) 25 mg in 250 mL NS infusion kit, 5-15 mg/hr, Intravenous, Titrated, Rocio Pitts APRN    ondansetron (ZOFRAN) injection 4 mg, 4 mg, Intravenous, Q6H PRN, Rocio Pitts APRN    pantoprazole (PROTONIX) injection 40 mg, 40 mg, Intravenous, Q AM, Rocio Pitts APRN, 40 mg at 02/24/24 0525    Phosphorus Replacement - Follow Nurse / BPA Driven Protocol, , Does not apply, PRN, Rocio Pitts APRN    potassium phosphate 15 mmol in 0.9% normal saline 250 mL IVPB, 15 mmol, Intravenous, Once, Agus Baltazar MD, 15 mmol at 02/24/24 0907    Potassium Replacement - Follow Nurse / BPA Driven Protocol, , Does not apply, PRN, Rocio Pitts APRN    sodium chloride 0.9 % flush 10 mL, 10 mL, Intravenous, Q12H, Bills, Rocio A, APRN, 10 mL at 02/24/24 0908    sodium chloride 0.9 % flush 10 mL, 10 mL, Intravenous, PRN, Bills, Rocio A, APRN    sodium chloride 0.9 % infusion 40 mL, 40 mL, Intravenous, PRN, Bills, Rocio A, APRN    thiamine (B-1) injection 200 mg, 200 mg, Intravenous, Q8H, 200 mg at 02/24/24 0525 **FOLLOWED BY** [START ON 2/28/2024] thiamine (VITAMIN B-1) tablet 100 mg, 100  mg, Oral, Daily, Agus Baltazar MD      Objective     Vital Signs  Vitals:    02/24/24 0700 02/24/24 0800 02/24/24 0900 02/24/24 1000   BP: 122/86  122/91 100/62   BP Location:       Patient Position:       Pulse: 101 95 (!) 124 95   Resp:       Temp: 99.7 °F (37.6 °C)      TempSrc: Oral      SpO2: 98% 98%  (!) 81%   Weight:       Height:           Physical Exam:     General Appearance:    Awake and alert, in no acute distress   Head:  Facial swelling   Eyes:          Conjunctivae normal, anicteric sclera   Throat:   No oral lesions, no thrush, oral mucosa moist   Neck:   No adenopathy, supple, no JVD   Lungs:     Respirations regular, even and unlabored   Abdomen:     Soft, non-tender, non-distended, no rebound or guarding, no hepatosplenomegaly   Rectal:     Deferred   Extremities:   No edema, no cyanosis, moves equally bilaterally   Skin:   No bruising, no rash, no jaundice        Results Review:    CBC  Results from last 7 days   Lab Units 02/24/24  0521 02/23/24  0300 02/22/24 2013   RBC 10*6/mm3 2.95* 3.43* 3.75*   WBC 10*3/mm3 5.32 5.13 4.98   HEMOGLOBIN g/dL 10.0* 11.5* 12.7*   PLATELETS 10*3/mm3 68* 81* 96*       CMP  Results from last 7 days   Lab Units 02/24/24  0521 02/23/24  1708 02/23/24  0300 02/22/24  2217 02/22/24 2013   SODIUM mmol/L 133* 136 138 135* 137   POTASSIUM mmol/L 3.7 3.7 3.2* 3.2* 3.2*   CHLORIDE mmol/L 100 103 96* 96* 90*   CO2 mmol/L 16.5* 20.5* 20.9* 19.8* 14.9*   BUN mg/dL 2* 3* 5* 6 6   CREATININE mg/dL 0.87 0.68* 0.74* 0.70* 0.88   GLUCOSE mg/dL 104* 79 89 65 125*   ALBUMIN g/dL 3.6 3.3* 4.1  --  4.7   BILIRUBIN mg/dL 1.1  --  1.2  --  0.7   ALK PHOS U/L 199*  --  244*  --  244*   AST (SGOT) U/L 284*  --  567*  --  224*   ALT (SGPT) U/L 71*  --  100*  --  78*   AMMONIA umol/L  --   --  50  --   --        Amylase and Lipase        CRP         Imaging Results (Last 24 Hours)       Procedure Component Value Units Date/Time    CT Head Without Contrast [[id number]] Collected:  02/24/24 0631     Updated: 02/24/24 0631    Narrative:        Patient: JOSÉ HORTON  Time Out: 06:30  Exam(s): CT HEAD Without Contrast     EXAM:    CT Head Without Intravenous Contrast    CLINICAL HISTORY:     Reason for exam: Follow-up traumatic subdural hematoma.    TECHNIQUE:    Axial computed tomography images of the head brain without intravenous   contrast.  CTDI is 54.92 mGy and DLP is 1037.7 mGy-cm.  This CT exam was   performed according to the principle of ALARA (As Low As Reasonably   Achievable) by using one or more of the following dose reduction   techniques: automated exposure control, adjustment of the mA and or kV   according to patient size, and or use of iterative reconstruction   technique.    COMPARISON:    Noncontrast head CT 02 23 2024 0847 hrs.    FINDINGS:    Brain:  Stable acute subdural hematoma over the left cerebral   hemisphere measuring up to 5 mm is present with interval improvement in   superimposed acute subarachnoid hemorrhage best appreciated in the left   frontal sulci between the superior and middle frontal gyri.  No   significant white matter disease.    Ventricles:  Unremarkable.  No ventriculomegaly.    Bones joints:  Unremarkable.  No acute fracture.    Soft tissues:  Unremarkable.    Sinuses:  Unremarkable as visualized.  No acute sinusitis.    Mastoid air cells:  Unremarkable as visualized.  No mastoid effusion.    IMPRESSION:         Stable acute subdural hematoma over the left cerebral hemisphere   measuring up to 5 mm is present with interval improvement in superimposed   acute subarachnoid hemorrhage best appreciated in the left frontal sulci   between the superior and middle frontal gyri.        Impression:          Electronically signed by Steven Zhao MD on 02-24-24 at 0630    MRI Brain With & Without Contrast [[id number]] Resulted: 02/23/24 2238     Updated: 02/23/24 2252    CT Head Without Contrast [[id number]] Collected: 02/23/24 1002     Updated: 02/23/24  1652    Narrative:      CT HEAD WITHOUT CONTRAST     HISTORY: Intracranial hemorrhage, follow-up.     COMPARISON: CT head 02/23/2024.     FINDINGS: A septum cavum pellucidum is noted. A small to moderate-sized  supraorbital hematoma is appreciated on the right similar in appearance  as compared to the prior examination. A subdural hematoma is appreciated  overlying the left cerebral hemisphere anteriorly, laterally and  superolaterally which has increased in attenuation as compared to  02/23/2024. It measures approximately 6 mm in thickness as measured  perpendicular to the inner table on the coronal reconstructions.  Subarachnoid blood is appreciated overlying the anterior and superior  aspect of the left frontal lobe, unchanged. There was no evidence of  midline shift, acute infarction or of intra-axial hemorrhage.       Impression:      The pre-existing subdural hematoma overlying the left  cerebral hemisphere is unchanged in size measuring approximately 6 mm in  thickness. It has increased in attenuation however. Subarachnoid blood  overlying the left frontal lobe is noted, present previously and  unchanged. Continued surveillance is recommended.     Radiation dose reduction techniques were utilized, including automated  exposure control and exposure modulation based on body size.        This report was finalized on 2/23/2024 4:49 PM by Dr. Cj Cole M.D  on Workstation: [id number]       CT Abdomen Pelvis Without Contrast [[id number]] Collected: 02/23/24 1144     Updated: 02/23/24 1230    Narrative:      CT SCAN OF THE ABDOMEN AND PELVIS WITHOUT CONTRAST     HISTORY: Subarachnoid hemorrhage. Nausea and vomiting.     FINDINGS: The CT scan was performed through the abdomen and pelvis  without contrast. The following findings are present:  1. The lung bases are clear. There is extensive low density throughout  the liver consistent with severe hepatic steatosis. No focal liver  lesions are seen. The spleen is not  enlarged. The gallbladder shows no  gallstones or wall thickening.  2. There is some vague increased density and suggestion of slight  haziness in the region of the head of pancreas and suspicious for  changes of mild acute pancreatitis and further clinical correlation is  recommended.  3. The adrenal glands and both kidneys are unremarkable. There is no  aortic aneurysm or retroperitoneal lymphadenopathy. The large and small  bowel loops are normal in caliber and show no inflammatory change. In  the pelvis, the urinary bladder has a smooth contour.        Radiation dose reduction techniques were utilized, including automated  exposure control and exposure modulation based on body size.        This report was finalized on 2/23/2024 12:27 PM by Dr. Sg Price M.D on Workstation: [id number]                 ASSESSMENT:  Elevated LFTs.  ddx ETOH hepatitis vs. Muscle injury from seizure. Less likely acute hepatitis or vascular injury, or ischemic hepatopathy  Seizure and subdural hematoma. LDH and GGT elevated. Hepatic doppler negative. Hepatitis panel non-reactive.   Alcohol use  Folate deficiency  Alcohol withdrawal concerns      PLAN:  Neurosurgery evaluation in progress, monitoring with frequent imaging  LFTs trending down.  Continue to monitor.  Alcohol withdrawal precautions.  No indication for steroids per DF score.  Continue folate.  Monitor electrolytes.  GI will follow.        Gisselle Aguiar, APRN  02/24/24  10:59 EST

## 2024-02-24 NOTE — PROGRESS NOTES
"DOS: 2024  NAME: Nida Peck   : 1995  PCP: Provider, No Known  Chief Complaint   Patient presents with    Seizures       Chief complaint: Alcohol withdrawal seizures.  Subjective: Patient seen and examined the bedside this morning, no acute overnight event.    Objective:  Vital signs: /86   Pulse 101   Temp 99.7 °F (37.6 °C) (Oral)   Resp 15   Ht 185.4 cm (73\")   Wt 65.7 kg (144 lb 13.5 oz)   SpO2 98%   BMI 19.11 kg/m²    Gen: Was sleeping on my assessment, easily awakened to sternal rub and started following commands, swollen right eye, vitals reviewed  MS: oriented x3, recent/remote memory intact, normal attention/concentration, language intact, no neglect.  CN: visual acuity grossly normal, PERRL on the left, unable to assess on the right due to swollen eye, EOMI, no facial droop, no dysarthria  Motor: Moving all extremities against gravity to command, normal tone    Laboratory results:  Lab Results   Component Value Date    GLUCOSE 104 (H) 2024    CALCIUM 7.5 (L) 2024     (L) 2024    K 3.7 2024    CO2 16.5 (L) 2024     2024    BUN 2 (L) 2024    CREATININE 0.87 2024    EGFRIFAFRI >60 2023    BCR 2.3 (L) 2024    ANIONGAP 16.5 (H) 2024     Lab Results   Component Value Date    WBC 5.32 2024    HGB 10.0 (L) 2024    HCT 28.8 (L) 2024    MCV 97.6 (H) 2024    PLT 68 (L) 2024     Lab Results   Component Value Date    LDL 67 2024         Lab 24  0300   HEMOGLOBIN A1C 5.50        Review of labs: Above labs reviewed    Review and interpretation of imaging: I personally reviewed his repeat his CT scan of the head this morning which showed stable subdural hematoma on the left cerebral hemisphere 5 mm in thickness.    Prolonged EEG monitoring with no seizure activity as detailed below    Impression:  This is an abnormal 2 to 12 hour EEG study due to the presence of diffuse theta " slowing with alpha, beta and some delta slowing seen.  There are no interictal epileptiform discharges and no electrographic seizures.  These electrophysiologic findings are indicative of mild to moderate encephalopathy. In addition, beta activity is seen; this can be due to medication effect, often seen with medications such as benzodiazepines.    Diagnoses:  -Left hemispheric subdural hematoma stable on repeat exam  -Alcohol abuse  -Alcohol withdrawal seizures, rule out epilepsy although felt less likely      Plan:  1.  Consult psychiatry giving his history of alcohol abuse may benefit from admission to alcohol detoxification program.  Also with anxiety, depression and possible underlying schizophrenia.  2.  Continue Keppra 500 mg twice daily  3.  Continue CIWA protocol and thiamine replacement  4.  Management of subdural hematoma as per neurosurgery  5.  Seizure precautions discussed with the patient and his brother at the bedside  6.  Follow-up with general neurology as an outpatient, MA texted to arrange follow-up with Dr. Tatum in 2-3 months.    Seizure Precautions:  ·    Do not drive 90 days. (this is to include: car, bicycle, or motorcycle)  ·    Do not drive operate dangerous equipment such as power tools, heavy machinery with moving parts, or an open flame.  ·    Do not swim alone (this would include a bathtub full of water is a small swimming pool).  ·    Avoid unsecured heights. (this is to include: scaffolding, ladders, trees, and roofs).    Neurology will sign off and see again as needed    Management discussed with Dr. Tena, patient, family, nursing staff.

## 2024-02-24 NOTE — PLAN OF CARE
Goal Outcome Evaluation:      VSS, A&Ox3, disoriented to situation.  Neuro status unchanged.  Patient remains confused and tries to get out of bed.  I am able to reorient him, but he seems distrustful of staff.  No acute events this shift, will continue to monitor.

## 2024-02-24 NOTE — PROGRESS NOTES
LOS: 1 day   Patient Care Team:  Provider, No Known as PCP - General    Subjective     Patient new to me today picking up pulmonary critical care coverage from Dr. Lincoln Wynne I have reviewed his another records patient with subarachnoid and subdural hematomas A-fib and rapid rate  Patient is not real conversant appears to be somewhat passive-aggressive.  He is not really answering my questions he was talking to me until I started asking questions about what happened that he just stopped answering he was not real cooperative with exam and actually resisted exam.  Review of Systems:          Objective     Vital Signs  Vital Sign Min/Max for last 24 hours  Temp  Min: 97.9 °F (36.6 °C)  Max: 99.7 °F (37.6 °C)   BP  Min: 87/70  Max: 152/141   Pulse  Min: 64  Max: 124   Resp  Min: 12  Max: 16   SpO2  Min: 81 %  Max: 100 %   No data recorded   No data recorded        Ventilator/Non-Invasive Ventilation Settings (From admission, onward)      None                         Body mass index is 19.11 kg/m².  I/O last 3 completed shifts:  In: 5743 [P.O.:100; I.V.:3643; IV Piggyback:2000]  Out: 1225 [Urine:1225]  No intake/output data recorded.        Physical Exam:  General Appearance: Well-developed black male looks a decade or 2 at least older than his stated age he is resting in bed he is sort of curled up in a fetal position started out talking to me and that he just sort of stopped answering or cooperating  Eyes: His left pupil is about 3 mm and reactive the right eye is pretty much swollen shut  ENT: He has a abrasion over the right brow that is sutured I do not see any significant erythema or drainage associated with the wound.  I do not see any definite facial asymmetry he will not open his mouth for me to get a good look  Neck: Trachea midline no palpable adenopathy or thyromegaly no visible jugular venous distention  Lungs: Clear nonlabored symmetric expansion  Cardiac: Regular rate rhythm no murmur no  gallop  Abdomen: Soft nontender no palpable hepatosplenomegaly or masses  : Not examined  Musculoskeletal: Grossly normal I do not elicit any tenderness along his arms or legs  Skin: Warm and dry no jaundice no petechiae  Neuro: He is definitely moving all 4 extremities he is not very cooperative he can talk and he was initially and then he just sort of climbed up stop talking and stopped cooperating and was resisting going over so I could listen to him, strength his arms and legs etc.  Extremities/P Vascular: No clubbing no cyanosis he has palpable radial and dorsalis pedis pulses  MSE: Passive-aggressive       Labs:  Results from last 7 days   Lab Units 02/24/24  0521 02/23/24  1708 02/23/24  0300 02/22/24 2217 02/22/24 2013   GLUCOSE mg/dL 104* 79 89 65 125*   SODIUM mmol/L 133* 136 138 135* 137   POTASSIUM mmol/L 3.7 3.7 3.2* 3.2* 3.2*   MAGNESIUM mg/dL  --   --   --  1.8 1.2*   CO2 mmol/L 16.5* 20.5* 20.9* 19.8* 14.9*   CHLORIDE mmol/L 100 103 96* 96* 90*   ANION GAP mmol/L 16.5* 12.5 21.1* 19.2* 32.1*   CREATININE mg/dL 0.87 0.68* 0.74* 0.70* 0.88   BUN mg/dL 2* 3* 5* 6 6   BUN / CREAT RATIO  2.3* 4.4* 6.8* 8.6 6.8*   CALCIUM mg/dL 7.5* 7.4* 8.3* 7.9* 8.5*   ALK PHOS U/L 199*  --  244*  --  244*   TOTAL PROTEIN g/dL 5.9*  --  6.8  --  7.6   ALT (SGPT) U/L 71*  --  100*  --  78*   AST (SGOT) U/L 284*  --  567*  --  224*   BILIRUBIN mg/dL 1.1  --  1.2  --  0.7   ALBUMIN g/dL 3.6 3.3* 4.1  --  4.7   GLOBULIN gm/dL 2.3  --  2.7  --  2.9     Estimated Creatinine Clearance: 114.3 mL/min (by C-G formula based on SCr of 0.87 mg/dL).      Results from last 7 days   Lab Units 02/24/24  0521 02/23/24  0300 02/22/24 2013   WBC 10*3/mm3 5.32 5.13 4.98   RBC 10*6/mm3 2.95* 3.43* 3.75*   HEMOGLOBIN g/dL 10.0* 11.5* 12.7*   HEMATOCRIT % 28.8* 33.4* 36.6*   MCV fL 97.6* 97.4* 97.6*   MCH pg 33.9* 33.5* 33.9*   MCHC g/dL 34.7 34.4 34.7   RDW % 15.4 15.8* 15.2   RDW-SD fl 53.9 56.1* 53.9   MPV fL 11.4 10.5 10.6   PLATELETS  10*3/mm3 68* 81* 96*   NEUTROPHIL % %  --   --  63.3   LYMPHOCYTE % %  --   --  23.3   MONOCYTES % %  --   --  11.4   EOSINOPHIL % %  --   --  1.0   BASOPHIL % %  --   --  0.4   NEUTROS ABS 10*3/mm3  --   --  3.15   LYMPHS ABS 10*3/mm3  --   --  1.16   MONOS ABS 10*3/mm3  --   --  0.57   EOS ABS 10*3/mm3  --   --  0.05   BASOS ABS 10*3/mm3  --   --  0.02         Results from last 7 days   Lab Units 02/23/24  0511 02/22/24  2217   CK TOTAL U/L 236* 163             Results from last 7 days   Lab Units 02/24/24  0521 02/23/24  0617 02/23/24  0511   LACTATE mmol/L  --  1.5  --    PROCALCITONIN ng/mL 0.42*  --  0.60*     Results from last 7 days   Lab Units 02/23/24  0300   INR  1.19*     Microbiology Results (last 10 days)       ** No results found for the last 240 hours. **                docusate sodium, 100 mg, Oral, Daily  folic acid 1 mg in sodium chloride 0.9 % 50 mL IVPB, 1 mg, Intravenous, Daily  levETIRAcetam, 500 mg, Intravenous, Q12H  LORazepam, 1 mg, Intravenous, Q6H  metoprolol tartrate, 12.5 mg, Oral, Q12H  pantoprazole, 40 mg, Intravenous, Q AM  potassium phosphate, 15 mmol, Intravenous, Once  sodium chloride, 10 mL, Intravenous, Q12H  thiamine (B-1) IV, 200 mg, Intravenous, Q8H   Followed by  [START ON 2/28/2024] thiamine, 100 mg, Oral, Daily      dextrose 5 % and sodium chloride 0.9 %, 100 mL/hr, Last Rate: 100 mL/hr (02/24/24 0404)  niCARdipine, 5-15 mg/hr        Diagnostics:  CT Head Without Contrast    Result Date: 2/24/2024  Patient: JOSÉ HORTON  Time Out: 06:30 Exam(s): CT HEAD Without Contrast EXAM:   CT Head Without Intravenous Contrast CLINICAL HISTORY:    Reason for exam: Follow-up traumatic subdural hematoma. TECHNIQUE:   Axial computed tomography images of the head brain without intravenous contrast.  CTDI is 54.92 mGy and DLP is 1037.7 mGy-cm.  This CT exam was performed according to the principle of ALARA (As Low As Reasonably Achievable) by using one or more of the following dose  reduction techniques: automated exposure control, adjustment of the mA and or kV according to patient size, and or use of iterative reconstruction technique. COMPARISON:   Noncontrast head CT 02 23 2024 0847 hrs. FINDINGS:   Brain:  Stable acute subdural hematoma over the left cerebral hemisphere measuring up to 5 mm is present with interval improvement in superimposed acute subarachnoid hemorrhage best appreciated in the left frontal sulci between the superior and middle frontal gyri.  No significant white matter disease.   Ventricles:  Unremarkable.  No ventriculomegaly.   Bones joints:  Unremarkable.  No acute fracture.   Soft tissues:  Unremarkable.   Sinuses:  Unremarkable as visualized.  No acute sinusitis.   Mastoid air cells:  Unremarkable as visualized.  No mastoid effusion. IMPRESSION:       Stable acute subdural hematoma over the left cerebral hemisphere measuring up to 5 mm is present with interval improvement in superimposed acute subarachnoid hemorrhage best appreciated in the left frontal sulci between the superior and middle frontal gyri.      Electronically signed by Steven Zhao MD on 02-24-24 at 0630    EEG Continuous Monitoring With Video    Result Date: 2/23/2024  Date of onset: 2/23/24 Date of offset: 2/23/24 Indication: seizure like activity, epilepsy vs alcohol withdrawal Technical description:  This is a 21 channel digital EEG recording that began on 1133 to 1159 with 2 minute pause to 1201 then ending 1409.  Background:  The background consists of a posteriorly dominant rhythm that is notably absent.  Anteriorly, there is as mixture of theta, alpha, beta, and delta activity.  There is fair spontaneous variability.  Hyperventilation and photic stimulation were not performed.  There is no focal slowing.  There are no interictal epileptiform discharges and no electrographic seizures noted during the study.  EKG demonstrates normal rate.  Impression: This is an abnormal 2 to 12 hour EEG  study due to the presence of diffuse theta slowing with alpha, beta and some delta slowing seen.  There are no interictal epileptiform discharges and no electrographic seizures.  These electrophysiologic findings are indicative of mild to moderate encephalopathy. In addition, beta activity is seen; this can be due to medication effect, often seen with medications such as benzodiazepines.     CT Head Without Contrast    Result Date: 2/23/2024  CT HEAD WITHOUT CONTRAST  HISTORY: Intracranial hemorrhage, follow-up.  COMPARISON: CT head 02/23/2024.  FINDINGS: A septum cavum pellucidum is noted. A small to moderate-sized supraorbital hematoma is appreciated on the right similar in appearance as compared to the prior examination. A subdural hematoma is appreciated overlying the left cerebral hemisphere anteriorly, laterally and superolaterally which has increased in attenuation as compared to 02/23/2024. It measures approximately 6 mm in thickness as measured perpendicular to the inner table on the coronal reconstructions. Subarachnoid blood is appreciated overlying the anterior and superior aspect of the left frontal lobe, unchanged. There was no evidence of midline shift, acute infarction or of intra-axial hemorrhage.      The pre-existing subdural hematoma overlying the left cerebral hemisphere is unchanged in size measuring approximately 6 mm in thickness. It has increased in attenuation however. Subarachnoid blood overlying the left frontal lobe is noted, present previously and unchanged. Continued surveillance is recommended.  Radiation dose reduction techniques were utilized, including automated exposure control and exposure modulation based on body size.   This report was finalized on 2/23/2024 4:49 PM by Dr. Cj Cole M.D on Workstation: [id number]      Duplex Portal Hepatic Complete CAR    Result Date: 2/23/2024    All vessels appear normal with normal flow direction and no evidence of thrombus.     CT  Abdomen Pelvis Without Contrast    Result Date: 2/23/2024  CT SCAN OF THE ABDOMEN AND PELVIS WITHOUT CONTRAST  HISTORY: Subarachnoid hemorrhage. Nausea and vomiting.  FINDINGS: The CT scan was performed through the abdomen and pelvis without contrast. The following findings are present: 1. The lung bases are clear. There is extensive low density throughout the liver consistent with severe hepatic steatosis. No focal liver lesions are seen. The spleen is not enlarged. The gallbladder shows no gallstones or wall thickening. 2. There is some vague increased density and suggestion of slight haziness in the region of the head of pancreas and suspicious for changes of mild acute pancreatitis and further clinical correlation is recommended. 3. The adrenal glands and both kidneys are unremarkable. There is no aortic aneurysm or retroperitoneal lymphadenopathy. The large and small bowel loops are normal in caliber and show no inflammatory change. In the pelvis, the urinary bladder has a smooth contour.   Radiation dose reduction techniques were utilized, including automated exposure control and exposure modulation based on body size.   This report was finalized on 2/23/2024 12:27 PM by Dr. gS Price M.D on Workstation: [id number]      CT Head Without Contrast    Result Date: 2/23/2024  CT OF THE HEAD WITHOUT CONTRAST; CT OF THE CERVICAL SPINE  HISTORY: Seizure  COMPARISON: February 22, 2024  TECHNIQUE: Axial CT imaging was obtained through the brain. No IV contrast was administered. Axial CT imaging was obtained through the cervical spine. Coronal and sagittal reformatted images were obtained.  FINDINGS: CT OF THE HEAD: The patient has a mixed density (although predominantly hyperdense) subdural hematoma overlying the left cerebral convexity. Maximum thickness is 1.1 cm. It was not visible on the earlier study. There is midline shift, measuring up to 6 mm. There is trace left parafalcine subdural hemorrhage, measuring up  to 2 mm, likely also a trace amount overlying the left tentorium. The patient also appears to have a small amount of subarachnoid hemorrhage. No calvarial fracture is seen. Mucosal thickening is noted within the ethmoid sinuses. There is a right supraorbital scalp hematoma/laceration. Right globe is intact. There is no post septal extension.  CT OF THE CERVICAL SPINE: No acute fracture or subluxation of the cervical spine is seen. Cervical vertebral body alignment appears within normal limits. There is no prevertebral soft tissue swelling. Images through the lung apices demonstrate emphysematous changes, which is an advanced finding for the age of 29. Thyroid gland and trachea are within normal limits.       1. Left cerebral convexity subdural hematoma with a maximum thickness of 1.1 cm. Midline shift of 6 mm. There is also trace subarachnoid hemorrhage, as well as trace left parafalcine and left tentorial hemorrhage. 2. No acute fracture or subluxation of the cervical spine is seen.  FINDINGS were discussed with Dr. Mckeon at 12:52 a.m.    Radiation dose reduction techniques were utilized, including automated exposure control and exposure modulation based on body size.   This report was finalized on 2/23/2024 12:55 AM by Dr. Alexa Ferrer M.D on Workstation: [id number]      CT Cervical Spine Without Contrast    Result Date: 2/23/2024  CT OF THE HEAD WITHOUT CONTRAST; CT OF THE CERVICAL SPINE  HISTORY: Seizure  COMPARISON: February 22, 2024  TECHNIQUE: Axial CT imaging was obtained through the brain. No IV contrast was administered. Axial CT imaging was obtained through the cervical spine. Coronal and sagittal reformatted images were obtained.  FINDINGS: CT OF THE HEAD: The patient has a mixed density (although predominantly hyperdense) subdural hematoma overlying the left cerebral convexity. Maximum thickness is 1.1 cm. It was not visible on the earlier study. There is midline shift, measuring up to 6 mm.  There is trace left parafalcine subdural hemorrhage, measuring up to 2 mm, likely also a trace amount overlying the left tentorium. The patient also appears to have a small amount of subarachnoid hemorrhage. No calvarial fracture is seen. Mucosal thickening is noted within the ethmoid sinuses. There is a right supraorbital scalp hematoma/laceration. Right globe is intact. There is no post septal extension.  CT OF THE CERVICAL SPINE: No acute fracture or subluxation of the cervical spine is seen. Cervical vertebral body alignment appears within normal limits. There is no prevertebral soft tissue swelling. Images through the lung apices demonstrate emphysematous changes, which is an advanced finding for the age of 29. Thyroid gland and trachea are within normal limits.       1. Left cerebral convexity subdural hematoma with a maximum thickness of 1.1 cm. Midline shift of 6 mm. There is also trace subarachnoid hemorrhage, as well as trace left parafalcine and left tentorial hemorrhage. 2. No acute fracture or subluxation of the cervical spine is seen.  FINDINGS were discussed with Dr. Mckeon at 12:52 a.m.    Radiation dose reduction techniques were utilized, including automated exposure control and exposure modulation based on body size.   This report was finalized on 2/23/2024 12:55 AM by Dr. Alexa Ferrer M.D on Workstation: [id number]      XR Chest 1 View    Result Date: 2/22/2024  XR CHEST 1 VW-  HISTORY: 29-year-old male with seizure.  FINDINGS: There is no evidence for pneumonia, effusions, CHF, or pneumothorax.  This report was finalized on 2/22/2024 8:45 PM by Dr. Kimmy Carmona M.D on Workstation: [id number]      CT Head Without Contrast    Result Date: 2/22/2024  CT HEAD WO CONTRAST-  INDICATIONS: Seizure  TECHNIQUE: Radiation dose reduction techniques were utilized, including automated exposure control and exposure modulation based on body size. Noncontrast head CT  COMPARISON: None available  FINDINGS:     No acute intracranial hemorrhage, midline shift or mass effect. No acute territorial infarct is identified.   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 Ventricles, cisterns, cerebral sulci are otherwise unremarkable for patient age.  The visualized paranasal sinuses, orbits, mastoid air cells are unremarkable.        No acute intracranial hemorrhage or hydrocephalus. Cyst of the septum pellucidum.  For further evaluation of the cause of patient's seizure, if indicated, enhanced MRI is advised.  This report was finalized on 2/22/2024 8:41 PM by Dr. Albert Moran M.D on Workstation: [id number]              Active Hospital Problems    Diagnosis  POA    **Subdural hematoma [S06.5XAA]  Yes      Resolved Hospital Problems   No resolved problems to display.         Assessment & Plan     Intracranial hemorrhage with both subdural and subarachnoid hemorrhage traumatic left cerebral hemisphere.  Neurosurgery following, follow-up scan later today planned.  Seizure most likely alcohol withdrawal although with the trauma and bleed discussed with neurology and they recommend continuing Keppra for several months and following up with them as an outpatient in a few months.  Neurology has reviewed seizure precautions with the patient and his family  Alcohol abuse and possible alcohol withdrawal patient on CIWA protocol and thiamine.  He will need to look into outpatient treatment when he is ready for discharge.  A-fib with rapid ventricular response reported I do not see any EKGs with any definite A-fib documented he is sinus now we will continue to monitor  Alcoholic ketoacidosis improving  Acute alcoholic hepatitis as hepatitis panel negative and hepatic Doppler negative.  He is starting to improve.  Discriminant function does not suggest steroids warranted at this time.  Anemia macrocytic probably deficient on  replacement  Hyperglycemia  Fluids/electrolytes/nutrition sodium down little will need to follow phosphorus low replace  Thrombocytopenia suspect alcoholic marrow suppression will follow dropped below 50 K will need to consider transfusion the intracranial bleed.  Elevated procalcitonin it is falling normal white count ,afebrile no clear infection source we will continue to follow.  Infection.  Laceration over right eye with multiple sutures skin a large ecchymoses send swollen I had really cannot get it open.    Plan for disposition:    Caleb Tena Jr, MD  02/24/24  11:49 EST    Time: Critical care time 36 minutes

## 2024-02-24 NOTE — PLAN OF CARE
Goal Outcome Evaluation:  Plan of Care Reviewed With: patient           Outcome Evaluation: Patient seen for PT session this PM. Patient supine in bed upon arrival. Patient appeared very drowsy and difficult to arrouse initially. Patient needing to be cleaned up. With encouragement patient able to sit up to EOB with Madiha. Patient sat at EOB for several minutes with Madiha-modA needed at times for sitting balance as patient losing his static sitting balance in all directions without pattern. Patient appeared AO to self only. Patient required increased time to respond to questions and follow commands. Difficulty understanding patient majority of the time as patient appeared to be mumbling. Patient required Madiha-modAx2 to to perform STS from EOB. Patient very impulsive in standing. Patient took a few very unsteady ataxic steps towards HOB with Madiha-modAx2. While in standing patient began urinating on himself and the floor though patient did not appear aware of the situation. Assisted aide with getting patient back to bed and changed. Patient would continue to benefit from skilled PT intervention to address deficits in functional mobility and maximize safety and independence. PT will continue to monitor.

## 2024-02-25 ENCOUNTER — APPOINTMENT (OUTPATIENT)
Dept: CT IMAGING | Facility: HOSPITAL | Age: 29
End: 2024-02-25
Payer: COMMERCIAL

## 2024-02-25 LAB
ALBUMIN SERPL-MCNC: 3.8 G/DL (ref 3.5–5.2)
ALBUMIN/GLOB SERPL: 1.6 G/DL
ALP SERPL-CCNC: 194 U/L (ref 39–117)
ALT SERPL W P-5'-P-CCNC: 65 U/L (ref 1–41)
ANION GAP SERPL CALCULATED.3IONS-SCNC: 12 MMOL/L (ref 5–15)
AST SERPL-CCNC: 156 U/L (ref 1–40)
BILIRUB SERPL-MCNC: 0.9 MG/DL (ref 0–1.2)
BUN SERPL-MCNC: <2 MG/DL (ref 6–20)
BUN/CREAT SERPL: ABNORMAL
CALCIUM SPEC-SCNC: 7.6 MG/DL (ref 8.6–10.5)
CHLORIDE SERPL-SCNC: 105 MMOL/L (ref 98–107)
CO2 SERPL-SCNC: 23 MMOL/L (ref 22–29)
CREAT SERPL-MCNC: 0.86 MG/DL (ref 0.76–1.27)
DEPRECATED RDW RBC AUTO: 53.6 FL (ref 37–54)
EGFRCR SERPLBLD CKD-EPI 2021: 118.7 ML/MIN/1.73
ERYTHROCYTE [DISTWIDTH] IN BLOOD BY AUTOMATED COUNT: 15.2 % (ref 12.3–15.4)
GLOBULIN UR ELPH-MCNC: 2.4 GM/DL
GLUCOSE BLDC GLUCOMTR-MCNC: 107 MG/DL (ref 70–130)
GLUCOSE BLDC GLUCOMTR-MCNC: 110 MG/DL (ref 70–130)
GLUCOSE BLDC GLUCOMTR-MCNC: 113 MG/DL (ref 70–130)
GLUCOSE BLDC GLUCOMTR-MCNC: 120 MG/DL (ref 70–130)
GLUCOSE SERPL-MCNC: 122 MG/DL (ref 65–99)
HCT VFR BLD AUTO: 33.3 % (ref 37.5–51)
HGB BLD-MCNC: 11.8 G/DL (ref 13–17.7)
MCH RBC QN AUTO: 34.7 PG (ref 26.6–33)
MCHC RBC AUTO-ENTMCNC: 35.4 G/DL (ref 31.5–35.7)
MCV RBC AUTO: 97.9 FL (ref 79–97)
PHOSPHATE SERPL-MCNC: 2.8 MG/DL (ref 2.5–4.5)
PLATELET # BLD AUTO: 75 10*3/MM3 (ref 140–450)
PMV BLD AUTO: 11.9 FL (ref 6–12)
POTASSIUM SERPL-SCNC: 3.6 MMOL/L (ref 3.5–5.2)
POTASSIUM SERPL-SCNC: 3.8 MMOL/L (ref 3.5–5.2)
PROT SERPL-MCNC: 6.2 G/DL (ref 6–8.5)
RBC # BLD AUTO: 3.4 10*6/MM3 (ref 4.14–5.8)
SODIUM SERPL-SCNC: 140 MMOL/L (ref 136–145)
WBC NRBC COR # BLD AUTO: 4.83 10*3/MM3 (ref 3.4–10.8)

## 2024-02-25 PROCEDURE — 84100 ASSAY OF PHOSPHORUS: CPT | Performed by: INTERNAL MEDICINE

## 2024-02-25 PROCEDURE — 25010000002 POTASSIUM CHLORIDE 10 MEQ/100ML SOLUTION

## 2024-02-25 PROCEDURE — 25010000002 LORAZEPAM PER 2 MG: Performed by: INTERNAL MEDICINE

## 2024-02-25 PROCEDURE — 80053 COMPREHEN METABOLIC PANEL: CPT

## 2024-02-25 PROCEDURE — 85027 COMPLETE CBC AUTOMATED: CPT

## 2024-02-25 PROCEDURE — 84132 ASSAY OF SERUM POTASSIUM: CPT

## 2024-02-25 PROCEDURE — 25010000002 LEVETRIRACETAM PER 10 MG: Performed by: STUDENT IN AN ORGANIZED HEALTH CARE EDUCATION/TRAINING PROGRAM

## 2024-02-25 PROCEDURE — 99231 SBSQ HOSP IP/OBS SF/LOW 25: CPT | Performed by: NEUROLOGICAL SURGERY

## 2024-02-25 PROCEDURE — 82948 REAGENT STRIP/BLOOD GLUCOSE: CPT

## 2024-02-25 PROCEDURE — 70450 CT HEAD/BRAIN W/O DYE: CPT

## 2024-02-25 PROCEDURE — 25010000002 THIAMINE HCL 200 MG/2ML SOLUTION: Performed by: INTERNAL MEDICINE

## 2024-02-25 RX ORDER — NICOTINE 21 MG/24HR
1 PATCH, TRANSDERMAL 24 HOURS TRANSDERMAL
Status: DISCONTINUED | OUTPATIENT
Start: 2024-02-25 | End: 2024-02-28 | Stop reason: HOSPADM

## 2024-02-25 RX ORDER — POTASSIUM CHLORIDE 7.45 MG/ML
10 INJECTION INTRAVENOUS
Status: COMPLETED | OUTPATIENT
Start: 2024-02-25 | End: 2024-02-25

## 2024-02-25 RX ADMIN — NICOTINE 1 PATCH: 21 PATCH, EXTENDED RELEASE TRANSDERMAL at 10:32

## 2024-02-25 RX ADMIN — LORAZEPAM 2 MG: 2 INJECTION INTRAMUSCULAR; INTRAVENOUS at 00:15

## 2024-02-25 RX ADMIN — POTASSIUM CHLORIDE 10 MEQ: 7.46 INJECTION, SOLUTION INTRAVENOUS at 09:45

## 2024-02-25 RX ADMIN — THIAMINE HYDROCHLORIDE 200 MG: 100 INJECTION, SOLUTION INTRAMUSCULAR; INTRAVENOUS at 21:19

## 2024-02-25 RX ADMIN — POTASSIUM CHLORIDE 10 MEQ: 7.46 INJECTION, SOLUTION INTRAVENOUS at 08:32

## 2024-02-25 RX ADMIN — PANTOPRAZOLE SODIUM 40 MG: 40 INJECTION, POWDER, FOR SOLUTION INTRAVENOUS at 05:18

## 2024-02-25 RX ADMIN — FOLIC ACID 1 MG: 5 INJECTION, SOLUTION INTRAMUSCULAR; INTRAVENOUS; SUBCUTANEOUS at 09:43

## 2024-02-25 RX ADMIN — LORAZEPAM 2 MG: 2 INJECTION INTRAMUSCULAR; INTRAVENOUS at 07:24

## 2024-02-25 RX ADMIN — DEXMEDETOMIDINE HYDROCHLORIDE IN SODIUM CHLORIDE 0.2 MCG/KG/HR: 4 INJECTION INTRAVENOUS at 00:01

## 2024-02-25 RX ADMIN — POTASSIUM CHLORIDE 10 MEQ: 7.46 INJECTION, SOLUTION INTRAVENOUS at 05:37

## 2024-02-25 RX ADMIN — LEVETIRACETAM 500 MG: 500 INJECTION, SOLUTION INTRAVENOUS at 20:05

## 2024-02-25 RX ADMIN — LORAZEPAM 1 MG: 2 INJECTION INTRAMUSCULAR; INTRAVENOUS at 20:06

## 2024-02-25 RX ADMIN — METOPROLOL TARTRATE 12.5 MG: 25 TABLET, FILM COATED ORAL at 20:05

## 2024-02-25 RX ADMIN — DEXMEDETOMIDINE HYDROCHLORIDE IN SODIUM CHLORIDE 0.4 MCG/KG/HR: 4 INJECTION INTRAVENOUS at 16:19

## 2024-02-25 RX ADMIN — LORAZEPAM 2 MG: 2 INJECTION INTRAMUSCULAR; INTRAVENOUS at 21:19

## 2024-02-25 RX ADMIN — Medication 10 ML: at 08:33

## 2024-02-25 RX ADMIN — POTASSIUM CHLORIDE 10 MEQ: 7.46 INJECTION, SOLUTION INTRAVENOUS at 06:30

## 2024-02-25 RX ADMIN — THIAMINE HYDROCHLORIDE 200 MG: 100 INJECTION, SOLUTION INTRAMUSCULAR; INTRAVENOUS at 14:11

## 2024-02-25 RX ADMIN — Medication 10 ML: at 20:06

## 2024-02-25 RX ADMIN — THIAMINE HYDROCHLORIDE 200 MG: 100 INJECTION, SOLUTION INTRAMUSCULAR; INTRAVENOUS at 05:18

## 2024-02-25 RX ADMIN — LORAZEPAM 2 MG: 2 INJECTION INTRAMUSCULAR; INTRAVENOUS at 10:32

## 2024-02-25 RX ADMIN — LEVETIRACETAM 500 MG: 500 INJECTION, SOLUTION INTRAVENOUS at 08:32

## 2024-02-25 RX ADMIN — LORAZEPAM 2 MG: 2 INJECTION INTRAMUSCULAR; INTRAVENOUS at 01:37

## 2024-02-25 RX ADMIN — LORAZEPAM 2 MG: 2 INJECTION INTRAMUSCULAR; INTRAVENOUS at 14:11

## 2024-02-25 RX ADMIN — METOPROLOL TARTRATE 12.5 MG: 25 TABLET, FILM COATED ORAL at 08:32

## 2024-02-25 RX ADMIN — LORAZEPAM 2 MG: 2 INJECTION INTRAMUSCULAR; INTRAVENOUS at 04:01

## 2024-02-25 NOTE — NURSING NOTE
Access consulted for alcohol use. Patient currently disoriented and not appropriate for interview. Will re-attempt once patient becomes appropriate.

## 2024-02-25 NOTE — PLAN OF CARE
Goal Outcome Evaluation:      VSS, patient still on CIWA protocol and precedex.  Neuro status unchanged.  Patient remains distrustful of staff, confused and forgetful.  Adequate UOP in external catheter.  No acute events this shift, will continue to monitor.

## 2024-02-25 NOTE — PLAN OF CARE
Problem: Adult Inpatient Plan of Care  Goal: Plan of Care Review  Outcome: Ongoing, Progressing  Goal: Patient-Specific Goal (Individualized)  Outcome: Ongoing, Progressing  Goal: Absence of Hospital-Acquired Illness or Injury  Outcome: Ongoing, Progressing  Intervention: Identify and Manage Fall Risk  Recent Flowsheet Documentation  Taken 2/24/2024 0700 by Eduardo Eugene RN  Safety Promotion/Fall Prevention:   clutter free environment maintained   fall prevention program maintained   lighting adjusted   safety round/check completed  Taken 2/24/2024 0600 by Eduardo Eugene RN  Safety Promotion/Fall Prevention:   clutter free environment maintained   fall prevention program maintained   lighting adjusted   safety round/check completed  Intervention: Prevent Skin Injury  Recent Flowsheet Documentation  Taken 2/24/2024 0600 by Eduardo Eugene RN  Body Position: position changed independently  Intervention: Prevent Infection  Recent Flowsheet Documentation  Taken 2/24/2024 0700 by Eduardo Eugene RN  Infection Prevention:   environmental surveillance performed   personal protective equipment utilized   rest/sleep promoted   single patient room provided  Taken 2/24/2024 0600 by Eduardo Eugene RN  Infection Prevention:   environmental surveillance performed   personal protective equipment utilized   rest/sleep promoted   single patient room provided  Goal: Optimal Comfort and Wellbeing  Outcome: Ongoing, Progressing  Intervention: Monitor Pain and Promote Comfort  Recent Flowsheet Documentation  Taken 2/24/2024 0700 by Eduardo Eugene RN  Pain Management Interventions:   pain management plan reviewed with patient/caregiver   pillow support provided   position adjusted   quiet environment facilitated   relaxation techniques promoted   unnecessary movement minimized  Taken 2/24/2024 0600 by Eduardo Eugene RN  Pain Management Interventions:   pain management plan reviewed with patient/caregiver   pillow  support provided   position adjusted   quiet environment facilitated   relaxation techniques promoted   unnecessary movement minimized  Goal: Readiness for Transition of Care  Outcome: Ongoing, Progressing     Problem: Skin Injury Risk Increased  Goal: Skin Health and Integrity  Outcome: Ongoing, Progressing     Problem: Fall Injury Risk  Goal: Absence of Fall and Fall-Related Injury  Outcome: Ongoing, Progressing  Intervention: Identify and Manage Contributors  Recent Flowsheet Documentation  Taken 2/24/2024 0700 by Eduardo Eugene RN  Medication Review/Management: medications reviewed  Taken 2/24/2024 0600 by Eduardo Eugene RN  Medication Review/Management: medications reviewed  Intervention: Promote Injury-Free Environment  Recent Flowsheet Documentation  Taken 2/24/2024 0700 by Eduardo Eugene RN  Safety Promotion/Fall Prevention:   clutter free environment maintained   fall prevention program maintained   lighting adjusted   safety round/check completed  Taken 2/24/2024 0600 by Eduardo Eugene RN  Safety Promotion/Fall Prevention:   clutter free environment maintained   fall prevention program maintained   lighting adjusted   safety round/check completed   Goal Outcome Evaluation:

## 2024-02-25 NOTE — PROGRESS NOTES
LOS: 2 days   Patient Care Team:  Provider, No Known as PCP - General    Chief Complaint: Subdural hematoma after head trauma    Subjective     This patient really has no specific complaints.  He says he does not have a headache anymore.  The laceration over his right eye appears to be improving.    Interval History:     History taken from: patient chart    Objective      He has good movement in all 4 extremities.  He is asleep when I went into his room but I was able to easily awaken him.  He did not talk very much but was that way yesterday as well.    Vital Signs  Temp:  [98.1 °F (36.7 °C)-100.4 °F (38 °C)] 98.1 °F (36.7 °C)  Heart Rate:  [] 67  Resp:  [15-23] 23  BP: ()/() 105/73       Results Review:     I reviewed the patient's new clinical results.  CT this morning was read as stable.  I reviewed the films myself.  I think the subdural is improved further from what it was yesterday.      Assessment & Plan       Subdural hematoma      I do not think we need any further imaging at this point.  From my point of view he can be discharged home at any time.  We will plan to see him in the office in about 2 weeks with another CT just to be sure this is continuing to reabsorb.      Je Celeste MD  02/25/24  08:16 EST

## 2024-02-25 NOTE — PROGRESS NOTES
LOS: 2 days   Patient Care Team:  Provider, No Known as PCP - General    Subjective      patient with subarachnoid and subdural hematomas A-fib and rapid rate patient bed got moved yesterday because he was getting out of bed and urinating in the sink.  Patient very confused in addition to his benzodiazepines for alcohol withdrawal they had to add dexmedetomidine drip last night.  He is still quite confused this morning he is not oriented to place or time    Review of Systems:          Objective     Vital Signs  Vital Sign Min/Max for last 24 hours  Temp  Min: 98.1 °F (36.7 °C)  Max: 100.4 °F (38 °C)   BP  Min: 90/61  Max: 139/77   Pulse  Min: 67  Max: 124   Resp  Min: 15  Max: 23   SpO2  Min: 81 %  Max: 100 %   No data recorded   Weight  Min: 67.1 kg (147 lb 14.9 oz)  Max: 67.1 kg (147 lb 14.9 oz)        Ventilator/Non-Invasive Ventilation Settings (From admission, onward)      None                         Body mass index is 19.52 kg/m².  I/O last 3 completed shifts:  In: 4044.8 [I.V.:3794.8; IV Piggyback:250]  Out: 1850 [Urine:1850]  No intake/output data recorded.        Physical Exam:  General Appearance: Well-developed black male looks a decade or 2 at least older than his stated age he is resting in bed   Eyes: Pulls are about 3 mm equal and reactive to light the right eye is less swollen than yesterday  ENT: He has a laceration over the right brow that is sutured I do not see any significant erythema or drainage associated with the wound.  I do not see any definite facial asymmetry, mucous membranes are little dry  Neck: Trachea midline no palpable adenopathy or thyromegaly no visible jugular venous distention  Lungs: Clear nonlabored symmetric expansion  Cardiac: Regular rate rhythm no murmur no gallop  Abdomen: Soft nontender no palpable hepatosplenomegaly or masses  : Not examined  Musculoskeletal: Grossly normal   Skin: Warm and dry no jaundice no petechiae  Neuro: He is definitely moving all 4  extremities he is not very cooperative he can talk and he was initially confused he does not know where he is at or when it is.  Extremities/P Vascular: No clubbing no cyanosis he has palpable radial and dorsalis pedis pulses  MSE: He is encephalopathic may be even a little delirium       Labs:  Results from last 7 days   Lab Units 02/25/24  0355 02/24/24  0521 02/23/24  1708 02/23/24  0300 02/22/24 2217 02/22/24 2013   GLUCOSE mg/dL 122* 104* 79 89 65 125*   SODIUM mmol/L 140 133* 136 138 135* 137   POTASSIUM mmol/L 3.6 3.7 3.7 3.2* 3.2* 3.2*   MAGNESIUM mg/dL  --   --   --   --  1.8 1.2*   CO2 mmol/L 23.0 16.5* 20.5* 20.9* 19.8* 14.9*   CHLORIDE mmol/L 105 100 103 96* 96* 90*   ANION GAP mmol/L 12.0 16.5* 12.5 21.1* 19.2* 32.1*   CREATININE mg/dL 0.86 0.87 0.68* 0.74* 0.70* 0.88   BUN mg/dL <2* 2* 3* 5* 6 6   BUN / CREAT RATIO   --  2.3* 4.4* 6.8* 8.6 6.8*   CALCIUM mg/dL 7.6* 7.5* 7.4* 8.3* 7.9* 8.5*   ALK PHOS U/L 194* 199*  --  244*  --  244*   TOTAL PROTEIN g/dL 6.2 5.9*  --  6.8  --  7.6   ALT (SGPT) U/L 65* 71*  --  100*  --  78*   AST (SGOT) U/L 156* 284*  --  567*  --  224*   BILIRUBIN mg/dL 0.9 1.1  --  1.2  --  0.7   ALBUMIN g/dL 3.8 3.6 3.3* 4.1  --  4.7   GLOBULIN gm/dL 2.4 2.3  --  2.7  --  2.9     Estimated Creatinine Clearance: 118.1 mL/min (by C-G formula based on SCr of 0.86 mg/dL).      Results from last 7 days   Lab Units 02/25/24  0355 02/24/24  0521 02/23/24  0300 02/22/24 2013   WBC 10*3/mm3 4.83 5.32 5.13 4.98   RBC 10*6/mm3 3.40* 2.95* 3.43* 3.75*   HEMOGLOBIN g/dL 11.8* 10.0* 11.5* 12.7*   HEMATOCRIT % 33.3* 28.8* 33.4* 36.6*   MCV fL 97.9* 97.6* 97.4* 97.6*   MCH pg 34.7* 33.9* 33.5* 33.9*   MCHC g/dL 35.4 34.7 34.4 34.7   RDW % 15.2 15.4 15.8* 15.2   RDW-SD fl 53.6 53.9 56.1* 53.9   MPV fL 11.9 11.4 10.5 10.6   PLATELETS 10*3/mm3 75* 68* 81* 96*   NEUTROPHIL % %  --   --   --  63.3   LYMPHOCYTE % %  --   --   --  23.3   MONOCYTES % %  --   --   --  11.4   EOSINOPHIL % %  --   --   --   1.0   BASOPHIL % %  --   --   --  0.4   NEUTROS ABS 10*3/mm3  --   --   --  3.15   LYMPHS ABS 10*3/mm3  --   --   --  1.16   MONOS ABS 10*3/mm3  --   --   --  0.57   EOS ABS 10*3/mm3  --   --   --  0.05   BASOS ABS 10*3/mm3  --   --   --  0.02         Results from last 7 days   Lab Units 02/23/24  0511 02/22/24  2217   CK TOTAL U/L 236* 163             Results from last 7 days   Lab Units 02/24/24  0521 02/23/24  0617 02/23/24  0511   LACTATE mmol/L  --  1.5  --    PROCALCITONIN ng/mL 0.42*  --  0.60*     Results from last 7 days   Lab Units 02/23/24  0300   INR  1.19*     Microbiology Results (last 10 days)       ** No results found for the last 240 hours. **                docusate sodium, 100 mg, Oral, Daily  folic acid 1 mg in sodium chloride 0.9 % 50 mL IVPB, 1 mg, Intravenous, Daily  levETIRAcetam, 500 mg, Intravenous, Q12H  LORazepam, 1 mg, Intravenous, Q6H  metoprolol tartrate, 12.5 mg, Oral, Q12H  pantoprazole, 40 mg, Intravenous, Q AM  potassium chloride, 10 mEq, Intravenous, Q1H  sodium chloride, 10 mL, Intravenous, Q12H  thiamine (B-1) IV, 200 mg, Intravenous, Q8H   Followed by  [START ON 2/28/2024] thiamine, 100 mg, Oral, Daily      dexmedetomidine, 0.2-1.5 mcg/kg/hr, Last Rate: 0.4 mcg/kg/hr (02/25/24 0024)  dextrose 5 % and sodium chloride 0.9 %, 100 mL/hr, Last Rate: 100 mL/hr (02/24/24 1630)  niCARdipine, 5-15 mg/hr        Diagnostics:  CT Head Without Contrast    Result Date: 2/24/2024  Patient: JOSÉ HORTON  Time Out: 06:30 Exam(s): CT HEAD Without Contrast EXAM:   CT Head Without Intravenous Contrast CLINICAL HISTORY:    Reason for exam: Follow-up traumatic subdural hematoma. TECHNIQUE:   Axial computed tomography images of the head brain without intravenous contrast.  CTDI is 54.92 mGy and DLP is 1037.7 mGy-cm.  This CT exam was performed according to the principle of ALARA (As Low As Reasonably Achievable) by using one or more of the following dose reduction techniques: automated exposure  control, adjustment of the mA and or kV according to patient size, and or use of iterative reconstruction technique. COMPARISON:   Noncontrast head CT 02 23 2024 0847 hrs. FINDINGS:   Brain:  Stable acute subdural hematoma over the left cerebral hemisphere measuring up to 5 mm is present with interval improvement in superimposed acute subarachnoid hemorrhage best appreciated in the left frontal sulci between the superior and middle frontal gyri.  No significant white matter disease.   Ventricles:  Unremarkable.  No ventriculomegaly.   Bones joints:  Unremarkable.  No acute fracture.   Soft tissues:  Unremarkable.   Sinuses:  Unremarkable as visualized.  No acute sinusitis.   Mastoid air cells:  Unremarkable as visualized.  No mastoid effusion. IMPRESSION:       Stable acute subdural hematoma over the left cerebral hemisphere measuring up to 5 mm is present with interval improvement in superimposed acute subarachnoid hemorrhage best appreciated in the left frontal sulci between the superior and middle frontal gyri.      Electronically signed by Steven Zhao MD on 02-24-24 at 0630    EEG Continuous Monitoring With Video    Result Date: 2/23/2024  Date of onset: 2/23/24 Date of offset: 2/23/24 Indication: seizure like activity, epilepsy vs alcohol withdrawal Technical description:  This is a 21 channel digital EEG recording that began on 1133 to 1159 with 2 minute pause to 1201 then ending 1409.  Background:  The background consists of a posteriorly dominant rhythm that is notably absent.  Anteriorly, there is as mixture of theta, alpha, beta, and delta activity.  There is fair spontaneous variability.  Hyperventilation and photic stimulation were not performed.  There is no focal slowing.  There are no interictal epileptiform discharges and no electrographic seizures noted during the study.  EKG demonstrates normal rate.  Impression: This is an abnormal 2 to 12 hour EEG study due to the presence of diffuse  theta slowing with alpha, beta and some delta slowing seen.  There are no interictal epileptiform discharges and no electrographic seizures.  These electrophysiologic findings are indicative of mild to moderate encephalopathy. In addition, beta activity is seen; this can be due to medication effect, often seen with medications such as benzodiazepines.     CT Head Without Contrast    Result Date: 2/23/2024  CT HEAD WITHOUT CONTRAST  HISTORY: Intracranial hemorrhage, follow-up.  COMPARISON: CT head 02/23/2024.  FINDINGS: A septum cavum pellucidum is noted. A small to moderate-sized supraorbital hematoma is appreciated on the right similar in appearance as compared to the prior examination. A subdural hematoma is appreciated overlying the left cerebral hemisphere anteriorly, laterally and superolaterally which has increased in attenuation as compared to 02/23/2024. It measures approximately 6 mm in thickness as measured perpendicular to the inner table on the coronal reconstructions. Subarachnoid blood is appreciated overlying the anterior and superior aspect of the left frontal lobe, unchanged. There was no evidence of midline shift, acute infarction or of intra-axial hemorrhage.      The pre-existing subdural hematoma overlying the left cerebral hemisphere is unchanged in size measuring approximately 6 mm in thickness. It has increased in attenuation however. Subarachnoid blood overlying the left frontal lobe is noted, present previously and unchanged. Continued surveillance is recommended.  Radiation dose reduction techniques were utilized, including automated exposure control and exposure modulation based on body size.   This report was finalized on 2/23/2024 4:49 PM by Dr. Cj Cole M.D on Workstation: [id number]      Duplex Portal Hepatic Complete CAR    Result Date: 2/23/2024    All vessels appear normal with normal flow direction and no evidence of thrombus.     CT Abdomen Pelvis Without Contrast    Result  Date: 2/23/2024  CT SCAN OF THE ABDOMEN AND PELVIS WITHOUT CONTRAST  HISTORY: Subarachnoid hemorrhage. Nausea and vomiting.  FINDINGS: The CT scan was performed through the abdomen and pelvis without contrast. The following findings are present: 1. The lung bases are clear. There is extensive low density throughout the liver consistent with severe hepatic steatosis. No focal liver lesions are seen. The spleen is not enlarged. The gallbladder shows no gallstones or wall thickening. 2. There is some vague increased density and suggestion of slight haziness in the region of the head of pancreas and suspicious for changes of mild acute pancreatitis and further clinical correlation is recommended. 3. The adrenal glands and both kidneys are unremarkable. There is no aortic aneurysm or retroperitoneal lymphadenopathy. The large and small bowel loops are normal in caliber and show no inflammatory change. In the pelvis, the urinary bladder has a smooth contour.   Radiation dose reduction techniques were utilized, including automated exposure control and exposure modulation based on body size.   This report was finalized on 2/23/2024 12:27 PM by Dr. Sg Price M.D on Workstation: [id number]      CT Head Without Contrast    Result Date: 2/23/2024  CT OF THE HEAD WITHOUT CONTRAST; CT OF THE CERVICAL SPINE  HISTORY: Seizure  COMPARISON: February 22, 2024  TECHNIQUE: Axial CT imaging was obtained through the brain. No IV contrast was administered. Axial CT imaging was obtained through the cervical spine. Coronal and sagittal reformatted images were obtained.  FINDINGS: CT OF THE HEAD: The patient has a mixed density (although predominantly hyperdense) subdural hematoma overlying the left cerebral convexity. Maximum thickness is 1.1 cm. It was not visible on the earlier study. There is midline shift, measuring up to 6 mm. There is trace left parafalcine subdural hemorrhage, measuring up to 2 mm, likely also a trace amount  overlying the left tentorium. The patient also appears to have a small amount of subarachnoid hemorrhage. No calvarial fracture is seen. Mucosal thickening is noted within the ethmoid sinuses. There is a right supraorbital scalp hematoma/laceration. Right globe is intact. There is no post septal extension.  CT OF THE CERVICAL SPINE: No acute fracture or subluxation of the cervical spine is seen. Cervical vertebral body alignment appears within normal limits. There is no prevertebral soft tissue swelling. Images through the lung apices demonstrate emphysematous changes, which is an advanced finding for the age of 29. Thyroid gland and trachea are within normal limits.       1. Left cerebral convexity subdural hematoma with a maximum thickness of 1.1 cm. Midline shift of 6 mm. There is also trace subarachnoid hemorrhage, as well as trace left parafalcine and left tentorial hemorrhage. 2. No acute fracture or subluxation of the cervical spine is seen.  FINDINGS were discussed with Dr. Mckeon at 12:52 a.m.    Radiation dose reduction techniques were utilized, including automated exposure control and exposure modulation based on body size.   This report was finalized on 2/23/2024 12:55 AM by Dr. Alexa Ferrer M.D on Workstation: [id number]      CT Cervical Spine Without Contrast    Result Date: 2/23/2024  CT OF THE HEAD WITHOUT CONTRAST; CT OF THE CERVICAL SPINE  HISTORY: Seizure  COMPARISON: February 22, 2024  TECHNIQUE: Axial CT imaging was obtained through the brain. No IV contrast was administered. Axial CT imaging was obtained through the cervical spine. Coronal and sagittal reformatted images were obtained.  FINDINGS: CT OF THE HEAD: The patient has a mixed density (although predominantly hyperdense) subdural hematoma overlying the left cerebral convexity. Maximum thickness is 1.1 cm. It was not visible on the earlier study. There is midline shift, measuring up to 6 mm. There is trace left parafalcine  subdural hemorrhage, measuring up to 2 mm, likely also a trace amount overlying the left tentorium. The patient also appears to have a small amount of subarachnoid hemorrhage. No calvarial fracture is seen. Mucosal thickening is noted within the ethmoid sinuses. There is a right supraorbital scalp hematoma/laceration. Right globe is intact. There is no post septal extension.  CT OF THE CERVICAL SPINE: No acute fracture or subluxation of the cervical spine is seen. Cervical vertebral body alignment appears within normal limits. There is no prevertebral soft tissue swelling. Images through the lung apices demonstrate emphysematous changes, which is an advanced finding for the age of 29. Thyroid gland and trachea are within normal limits.       1. Left cerebral convexity subdural hematoma with a maximum thickness of 1.1 cm. Midline shift of 6 mm. There is also trace subarachnoid hemorrhage, as well as trace left parafalcine and left tentorial hemorrhage. 2. No acute fracture or subluxation of the cervical spine is seen.  FINDINGS were discussed with Dr. Mckeon at 12:52 a.m.    Radiation dose reduction techniques were utilized, including automated exposure control and exposure modulation based on body size.   This report was finalized on 2/23/2024 12:55 AM by Dr. Alexa Ferrer M.D on Workstation: [id number]      XR Chest 1 View    Result Date: 2/22/2024  XR CHEST 1 VW-  HISTORY: 29-year-old male with seizure.  FINDINGS: There is no evidence for pneumonia, effusions, CHF, or pneumothorax.  This report was finalized on 2/22/2024 8:45 PM by Dr. Kimmy Carmona M.D on Workstation: [id number]      CT Head Without Contrast    Result Date: 2/22/2024  CT HEAD WO CONTRAST-  INDICATIONS: Seizure  TECHNIQUE: Radiation dose reduction techniques were utilized, including automated exposure control and exposure modulation based on body size. Noncontrast head CT  COMPARISON: None available  FINDINGS:    No acute intracranial  hemorrhage, midline shift or mass effect. No acute territorial infarct is identified.   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 Ventricles, cisterns, cerebral sulci are otherwise unremarkable for patient age.  The visualized paranasal sinuses, orbits, mastoid air cells are unremarkable.        No acute intracranial hemorrhage or hydrocephalus. Cyst of the septum pellucidum.  For further evaluation of the cause of patient's seizure, if indicated, enhanced MRI is advised.  This report was finalized on 2/22/2024 8:41 PM by Dr. Albert Moran M.D on Workstation: [id number]              Active Hospital Problems    Diagnosis  POA    **Subdural hematoma [S06.5XAA]  Yes      Resolved Hospital Problems   No resolved problems to display.         Assessment & Plan     Intracranial hemorrhage with both subdural and subarachnoid hemorrhage traumatic left cerebral hemisphere.  Neurosurgery.  Stable from their standpoint for discharge will see him in 2 weeks in our office with follow-up CT scan  Seizure most likely alcohol withdrawal although with the trauma and bleed discussed with neurology and they recommend continuing Keppra for several months and following up with them as an outpatient in a few months.  Neurology has reviewed seizure precautions with the patient and his family  Alcohol abuse and  alcohol withdrawal patient on CIWA protocol and thiamine.  Patient is pretty confused today in addition to benzodiazepines they had to add dexmedetomidine drip last night.  He will need to look into outpatient treatment when he is ready for discharge.  A-fib with rapid ventricular response reported I do not see any EKGs with any definite A-fib documented he is sinus now we will continue to monitor  Alcoholic ketoacidosis improving  Acute alcoholic hepatitis as hepatitis panel negative and hepatic Doppler negative.  He is starting to improve.  Discriminant  function does not suggest steroids warranted at this time.  Continue to follow  Tobacco abuse apparently patient is a smoker asking for cigarettes will put a NicoDerm patch on.  Anemia macrocytic probably deficient on replacement  Hyperglycemia  Fluids/electrolytes/nutrition hyponatremia resolved  Thrombocytopenia suspect alcoholic marrow suppression improving.  Elevated procalcitonin it is falling normal white count ,afebrile no clear infection source we will continue to follow.  Infection.  Laceration over right eye with multiple sutures skin a large ecchymoses send swollen I had really cannot get it open.    Plan for disposition:    Caleb Tena Jr, MD  02/25/24  07:04 EST    Time: Critical care time 35 minutes

## 2024-02-26 ENCOUNTER — TELEPHONE (OUTPATIENT)
Dept: NEUROSURGERY | Facility: CLINIC | Age: 29
End: 2024-02-26
Payer: COMMERCIAL

## 2024-02-26 DIAGNOSIS — S06.5XAA SUBDURAL HEMATOMA: Primary | ICD-10-CM

## 2024-02-26 LAB
ALBUMIN SERPL-MCNC: 3.3 G/DL (ref 3.5–5.2)
ALBUMIN/GLOB SERPL: 1.5 G/DL
ALDOLASE SERPL-CCNC: 22.4 U/L (ref 3.3–10.3)
ALP SERPL-CCNC: 181 U/L (ref 39–117)
ALT SERPL W P-5'-P-CCNC: 57 U/L (ref 1–41)
ANION GAP SERPL CALCULATED.3IONS-SCNC: 11.5 MMOL/L (ref 5–15)
AST SERPL-CCNC: 158 U/L (ref 1–40)
BILIRUB SERPL-MCNC: 0.6 MG/DL (ref 0–1.2)
BUN SERPL-MCNC: 4 MG/DL (ref 6–20)
BUN/CREAT SERPL: 6 (ref 7–25)
CALCIUM SPEC-SCNC: 6.9 MG/DL (ref 8.6–10.5)
CHLORIDE SERPL-SCNC: 109 MMOL/L (ref 98–107)
CO2 SERPL-SCNC: 17.5 MMOL/L (ref 22–29)
CREAT SERPL-MCNC: 0.67 MG/DL (ref 0.76–1.27)
DEPRECATED RDW RBC AUTO: 49.7 FL (ref 37–54)
EGFRCR SERPLBLD CKD-EPI 2021: 128 ML/MIN/1.73
ERYTHROCYTE [DISTWIDTH] IN BLOOD BY AUTOMATED COUNT: 14.4 % (ref 12.3–15.4)
GLOBULIN UR ELPH-MCNC: 2.2 GM/DL
GLUCOSE BLDC GLUCOMTR-MCNC: 100 MG/DL (ref 70–130)
GLUCOSE BLDC GLUCOMTR-MCNC: 116 MG/DL (ref 70–130)
GLUCOSE SERPL-MCNC: 179 MG/DL (ref 65–99)
HCT VFR BLD AUTO: 30.6 % (ref 37.5–51)
HGB BLD-MCNC: 10.5 G/DL (ref 13–17.7)
MCH RBC QN AUTO: 33 PG (ref 26.6–33)
MCHC RBC AUTO-ENTMCNC: 34.3 G/DL (ref 31.5–35.7)
MCV RBC AUTO: 96.2 FL (ref 79–97)
PHOSPHATE SERPL-MCNC: 2.7 MG/DL (ref 2.5–4.5)
PLATELET # BLD AUTO: 96 10*3/MM3 (ref 140–450)
PMV BLD AUTO: 11.6 FL (ref 6–12)
POTASSIUM SERPL-SCNC: 4.1 MMOL/L (ref 3.5–5.2)
PROT SERPL-MCNC: 5.5 G/DL (ref 6–8.5)
RBC # BLD AUTO: 3.18 10*6/MM3 (ref 4.14–5.8)
SODIUM SERPL-SCNC: 138 MMOL/L (ref 136–145)
WBC NRBC COR # BLD AUTO: 5.31 10*3/MM3 (ref 3.4–10.8)

## 2024-02-26 PROCEDURE — 90791 PSYCH DIAGNOSTIC EVALUATION: CPT

## 2024-02-26 PROCEDURE — 25010000002 CALCIUM GLUCONATE-NACL 1-0.675 GM/50ML-% SOLUTION: Performed by: INTERNAL MEDICINE

## 2024-02-26 PROCEDURE — 97110 THERAPEUTIC EXERCISES: CPT

## 2024-02-26 PROCEDURE — 25010000002 CALCIUM GLUCONATE 2-0.675 GM/100ML-% SOLUTION: Performed by: INTERNAL MEDICINE

## 2024-02-26 PROCEDURE — 82948 REAGENT STRIP/BLOOD GLUCOSE: CPT

## 2024-02-26 PROCEDURE — 85027 COMPLETE CBC AUTOMATED: CPT

## 2024-02-26 PROCEDURE — 97530 THERAPEUTIC ACTIVITIES: CPT

## 2024-02-26 PROCEDURE — 25010000002 LEVETRIRACETAM PER 10 MG: Performed by: STUDENT IN AN ORGANIZED HEALTH CARE EDUCATION/TRAINING PROGRAM

## 2024-02-26 PROCEDURE — 92526 ORAL FUNCTION THERAPY: CPT

## 2024-02-26 PROCEDURE — 84100 ASSAY OF PHOSPHORUS: CPT | Performed by: INTERNAL MEDICINE

## 2024-02-26 PROCEDURE — 25010000002 LORAZEPAM PER 2 MG: Performed by: INTERNAL MEDICINE

## 2024-02-26 PROCEDURE — 80053 COMPREHEN METABOLIC PANEL: CPT

## 2024-02-26 PROCEDURE — 25010000002 THIAMINE HCL 200 MG/2ML SOLUTION: Performed by: INTERNAL MEDICINE

## 2024-02-26 PROCEDURE — 97535 SELF CARE MNGMENT TRAINING: CPT

## 2024-02-26 PROCEDURE — 25810000003 DEXTROSE-NACL PER 500 ML: Performed by: INTERNAL MEDICINE

## 2024-02-26 RX ORDER — CALCIUM GLUCONATE 20 MG/ML
2000 INJECTION, SOLUTION INTRAVENOUS
Status: COMPLETED | OUTPATIENT
Start: 2024-02-26 | End: 2024-02-26

## 2024-02-26 RX ORDER — LOPERAMIDE HYDROCHLORIDE 2 MG/1
2 CAPSULE ORAL 3 TIMES DAILY PRN
Status: DISCONTINUED | OUTPATIENT
Start: 2024-02-26 | End: 2024-02-27

## 2024-02-26 RX ORDER — CALCIUM GLUCONATE 20 MG/ML
1000 INJECTION, SOLUTION INTRAVENOUS
Status: COMPLETED | OUTPATIENT
Start: 2024-02-26 | End: 2024-02-26

## 2024-02-26 RX ADMIN — THIAMINE HYDROCHLORIDE 200 MG: 100 INJECTION, SOLUTION INTRAMUSCULAR; INTRAVENOUS at 14:34

## 2024-02-26 RX ADMIN — CALCIUM GLUCONATE 1000 MG: 20 INJECTION, SOLUTION INTRAVENOUS at 08:22

## 2024-02-26 RX ADMIN — CALCIUM GLUCONATE 2000 MG: 20 INJECTION, SOLUTION INTRAVENOUS at 11:23

## 2024-02-26 RX ADMIN — PANTOPRAZOLE SODIUM 40 MG: 40 INJECTION, POWDER, FOR SOLUTION INTRAVENOUS at 06:11

## 2024-02-26 RX ADMIN — NICOTINE 1 PATCH: 21 PATCH, EXTENDED RELEASE TRANSDERMAL at 08:14

## 2024-02-26 RX ADMIN — METOPROLOL TARTRATE 12.5 MG: 25 TABLET, FILM COATED ORAL at 21:47

## 2024-02-26 RX ADMIN — Medication 10 ML: at 08:14

## 2024-02-26 RX ADMIN — CALCIUM GLUCONATE 2000 MG: 20 INJECTION, SOLUTION INTRAVENOUS at 09:23

## 2024-02-26 RX ADMIN — DEXTROSE AND SODIUM CHLORIDE 100 ML/HR: 5; 900 INJECTION, SOLUTION INTRAVENOUS at 11:23

## 2024-02-26 RX ADMIN — FOLIC ACID 1 MG: 5 INJECTION, SOLUTION INTRAMUSCULAR; INTRAVENOUS; SUBCUTANEOUS at 09:23

## 2024-02-26 RX ADMIN — THIAMINE HYDROCHLORIDE 200 MG: 100 INJECTION, SOLUTION INTRAMUSCULAR; INTRAVENOUS at 06:11

## 2024-02-26 RX ADMIN — CLONIDINE HYDROCHLORIDE 0.1 MG: 0.1 TABLET ORAL at 15:14

## 2024-02-26 RX ADMIN — LEVETIRACETAM 500 MG: 500 INJECTION, SOLUTION INTRAVENOUS at 08:08

## 2024-02-26 RX ADMIN — THIAMINE HYDROCHLORIDE 200 MG: 100 INJECTION, SOLUTION INTRAMUSCULAR; INTRAVENOUS at 21:47

## 2024-02-26 RX ADMIN — LORAZEPAM 2 MG: 2 INJECTION INTRAMUSCULAR; INTRAVENOUS at 04:40

## 2024-02-26 RX ADMIN — METOPROLOL TARTRATE 12.5 MG: 25 TABLET, FILM COATED ORAL at 08:09

## 2024-02-26 RX ADMIN — DEXMEDETOMIDINE HYDROCHLORIDE IN SODIUM CHLORIDE 0.6 MCG/KG/HR: 4 INJECTION INTRAVENOUS at 03:15

## 2024-02-26 RX ADMIN — DOCUSATE SODIUM 100 MG: 100 CAPSULE, LIQUID FILLED ORAL at 08:09

## 2024-02-26 RX ADMIN — CALCIUM GLUCONATE 2000 MG: 20 INJECTION, SOLUTION INTRAVENOUS at 14:31

## 2024-02-26 RX ADMIN — LEVETIRACETAM 500 MG: 500 INJECTION, SOLUTION INTRAVENOUS at 21:47

## 2024-02-26 NOTE — PLAN OF CARE
Goal Outcome Evaluation:  Plan of Care Reviewed With: patient           Outcome Evaluation: Pt seen for diet tolerance with lunch. Pt had lunch on tray as SLP entered room. Pt with decreased initiation to start the lunch as it had been sitting there for awhile. SLP encouraged pt to eat and assisted with set up. Pt ate regular diet and thin with no s/s. Will s/o from dysphagia therapy, but follow pt for a cog eval. Pt denied deficits, however, slow processing is evident. Will proceed with eval as able.      Anticipated Discharge Disposition (SLP): unknown

## 2024-02-26 NOTE — PLAN OF CARE
Goal Outcome Evaluation:         Precedex weaned off early in the shift.  CIWA scores ranged from 1-4.  No ativan administered during the shift. Pt agitated at times especially when wanting to get out of bed or questioned about history. This RN educated the pt on the importance of keeping him safe and preventing another fall. Pt was able to ambulate to the bathroom with a stand by assist multiple times.  Tolerating diet but slow to initiate eating.  Adequate UOP.  Elevated diastolic BP treated with PRNs and scheduled lopressor.   VSS.  Aox4.

## 2024-02-26 NOTE — PLAN OF CARE
Goal Outcome Evaluation:  Plan of Care Reviewed With: patient           Outcome Evaluation: Patient pleasant and agreeable to PT/OT cotreatment- continues to be monitored in the ICU. Today, patient was able to achieve EOB with CGA, stood with minAx2 and ambulated a total of 50' CGA-To HHAx2. Minimally unsteady throughout but no overt LOB. Patient agreeable to sitting UIC after therapy. PT will continue to follow and advance as able.

## 2024-02-26 NOTE — PROGRESS NOTES
LOS: 3 days   Patient Care Team:  Provider, No Known as PCP - General    Subjective      patient with subarachnoid and subdural hematomas A-fib and rapid rate and alcohol withdrawal.  Patient just got up to the chair with physical therapy he did reasonably well he is still on Precedex at point 6 mics per kilogram per hour nursing reports he was pretty good last night he is still really elusive with his answers    Review of Systems:          Objective     Vital Signs  Vital Sign Min/Max for last 24 hours  Temp  Min: 97.5 °F (36.4 °C)  Max: 97.9 °F (36.6 °C)   BP  Min: 94/83  Max: 141/107   Pulse  Min: 66  Max: 79   No data recorded   SpO2  Min: 80 %  Max: 100 %   No data recorded   No data recorded        Ventilator/Non-Invasive Ventilation Settings (From admission, onward)      None                         Body mass index is 19.52 kg/m².  I/O last 3 completed shifts:  In: 4298.6 [I.V.:4048.6; IV Piggyback:250]  Out: 2200 [Urine:2200]  No intake/output data recorded.        Physical Exam:  General Appearance: Well-developed black male looks a decade or 2 at least older than his stated age he is sitting up in a chair  Eyes: Pupils are about 3 mm equal and reactive to light the right eye is less swollen than yesterday he can open the eye fully now  ENT: He has a laceration over the right brow that is sutured I do not see any significant erythema or drainage associated with the wound.  I do not see any definite facial asymmetry, mucous membranes are little dry  Neck: Trachea midline no palpable adenopathy or thyromegaly no visible jugular venous distention  Lungs: Clear nonlabored symmetric expansion  Cardiac: Regular rate rhythm no murmur no gallop  Abdomen: Soft nontender no palpable hepatosplenomegaly or masses  : Not examined  Musculoskeletal: Grossly normal   Skin: Warm and dry no jaundice no petechiae  Neuro: Chair he is moving his extremities pretty well  Extremities/P Vascular: No clubbing no cyanosis  he has palpable radial and dorsalis pedis pulses  MSE: He is certainly a little more conversant today but sort of very elusive almost paranoid in his responses       Labs:  Results from last 7 days   Lab Units 02/26/24  0445 02/25/24  1451 02/25/24  0355 02/24/24  0521 02/23/24  1708 02/23/24  0300 02/22/24  2217 02/22/24 2013   GLUCOSE mg/dL 179*  --  122* 104* 79 89 65 125*   SODIUM mmol/L 138  --  140 133* 136 138 135* 137   POTASSIUM mmol/L 4.1 3.8 3.6 3.7 3.7 3.2* 3.2* 3.2*   MAGNESIUM mg/dL  --   --   --   --   --   --  1.8 1.2*   CO2 mmol/L 17.5*  --  23.0 16.5* 20.5* 20.9* 19.8* 14.9*   CHLORIDE mmol/L 109*  --  105 100 103 96* 96* 90*   ANION GAP mmol/L 11.5  --  12.0 16.5* 12.5 21.1* 19.2* 32.1*   CREATININE mg/dL 0.67*  --  0.86 0.87 0.68* 0.74* 0.70* 0.88   BUN mg/dL 4*  --  <2* 2* 3* 5* 6 6   BUN / CREAT RATIO  6.0*  --   --  2.3* 4.4* 6.8* 8.6 6.8*   CALCIUM mg/dL 6.9*  --  7.6* 7.5* 7.4* 8.3* 7.9* 8.5*   ALK PHOS U/L 181*  --  194* 199*  --  244*  --  244*   TOTAL PROTEIN g/dL 5.5*  --  6.2 5.9*  --  6.8  --  7.6   ALT (SGPT) U/L 57*  --  65* 71*  --  100*  --  78*   AST (SGOT) U/L 158*  --  156* 284*  --  567*  --  224*   BILIRUBIN mg/dL 0.6  --  0.9 1.1  --  1.2  --  0.7   ALBUMIN g/dL 3.3*  --  3.8 3.6 3.3* 4.1  --  4.7   GLOBULIN gm/dL 2.2  --  2.4 2.3  --  2.7  --  2.9     Estimated Creatinine Clearance: 151.6 mL/min (A) (by C-G formula based on SCr of 0.67 mg/dL (L)).      Results from last 7 days   Lab Units 02/26/24  0445 02/25/24  0355 02/24/24  0521 02/23/24  0300 02/22/24 2013   WBC 10*3/mm3 5.31 4.83 5.32 5.13 4.98   RBC 10*6/mm3 3.18* 3.40* 2.95* 3.43* 3.75*   HEMOGLOBIN g/dL 10.5* 11.8* 10.0* 11.5* 12.7*   HEMATOCRIT % 30.6* 33.3* 28.8* 33.4* 36.6*   MCV fL 96.2 97.9* 97.6* 97.4* 97.6*   MCH pg 33.0 34.7* 33.9* 33.5* 33.9*   MCHC g/dL 34.3 35.4 34.7 34.4 34.7   RDW % 14.4 15.2 15.4 15.8* 15.2   RDW-SD fl 49.7 53.6 53.9 56.1* 53.9   MPV fL 11.6 11.9 11.4 10.5 10.6   PLATELETS 10*3/mm3  96* 75* 68* 81* 96*   NEUTROPHIL % %  --   --   --   --  63.3   LYMPHOCYTE % %  --   --   --   --  23.3   MONOCYTES % %  --   --   --   --  11.4   EOSINOPHIL % %  --   --   --   --  1.0   BASOPHIL % %  --   --   --   --  0.4   NEUTROS ABS 10*3/mm3  --   --   --   --  3.15   LYMPHS ABS 10*3/mm3  --   --   --   --  1.16   MONOS ABS 10*3/mm3  --   --   --   --  0.57   EOS ABS 10*3/mm3  --   --   --   --  0.05   BASOS ABS 10*3/mm3  --   --   --   --  0.02         Results from last 7 days   Lab Units 02/23/24  0511 02/22/24  2217   CK TOTAL U/L 236* 163             Results from last 7 days   Lab Units 02/24/24  0521 02/23/24  0617 02/23/24  0511   LACTATE mmol/L  --  1.5  --    PROCALCITONIN ng/mL 0.42*  --  0.60*     Results from last 7 days   Lab Units 02/23/24  0300   INR  1.19*     Microbiology Results (last 10 days)       ** No results found for the last 240 hours. **                docusate sodium, 100 mg, Oral, Daily  folic acid 1 mg in sodium chloride 0.9 % 50 mL IVPB, 1 mg, Intravenous, Daily  levETIRAcetam, 500 mg, Intravenous, Q12H  metoprolol tartrate, 12.5 mg, Oral, Q12H  nicotine, 1 patch, Transdermal, Q24H  pantoprazole, 40 mg, Intravenous, Q AM  sodium chloride, 10 mL, Intravenous, Q12H  thiamine (B-1) IV, 200 mg, Intravenous, Q8H   Followed by  [START ON 2/28/2024] thiamine, 100 mg, Oral, Daily      dexmedetomidine, 0.2-1.5 mcg/kg/hr, Last Rate: 0.6 mcg/kg/hr (02/26/24 0617)  dextrose 5 % and sodium chloride 0.9 %, 100 mL/hr, Last Rate: 100 mL/hr (02/26/24 0617)  niCARdipine, 5-15 mg/hr        Diagnostics:  CT Head Without Contrast    Result Date: 2/24/2024  Patient: JOSÉ HORTON  Time Out: 06:30 Exam(s): CT HEAD Without Contrast EXAM:   CT Head Without Intravenous Contrast CLINICAL HISTORY:    Reason for exam: Follow-up traumatic subdural hematoma. TECHNIQUE:   Axial computed tomography images of the head brain without intravenous contrast.  CTDI is 54.92 mGy and DLP is 1037.7 mGy-cm.  This CT exam was  performed according to the principle of ALARA (As Low As Reasonably Achievable) by using one or more of the following dose reduction techniques: automated exposure control, adjustment of the mA and or kV according to patient size, and or use of iterative reconstruction technique. COMPARISON:   Noncontrast head CT 02 23 2024 0847 hrs. FINDINGS:   Brain:  Stable acute subdural hematoma over the left cerebral hemisphere measuring up to 5 mm is present with interval improvement in superimposed acute subarachnoid hemorrhage best appreciated in the left frontal sulci between the superior and middle frontal gyri.  No significant white matter disease.   Ventricles:  Unremarkable.  No ventriculomegaly.   Bones joints:  Unremarkable.  No acute fracture.   Soft tissues:  Unremarkable.   Sinuses:  Unremarkable as visualized.  No acute sinusitis.   Mastoid air cells:  Unremarkable as visualized.  No mastoid effusion. IMPRESSION:       Stable acute subdural hematoma over the left cerebral hemisphere measuring up to 5 mm is present with interval improvement in superimposed acute subarachnoid hemorrhage best appreciated in the left frontal sulci between the superior and middle frontal gyri.      Electronically signed by Steven Zhao MD on 02-24-24 at 0630    EEG Continuous Monitoring With Video    Result Date: 2/23/2024  Date of onset: 2/23/24 Date of offset: 2/23/24 Indication: seizure like activity, epilepsy vs alcohol withdrawal Technical description:  This is a 21 channel digital EEG recording that began on 1133 to 1159 with 2 minute pause to 1201 then ending 1409.  Background:  The background consists of a posteriorly dominant rhythm that is notably absent.  Anteriorly, there is as mixture of theta, alpha, beta, and delta activity.  There is fair spontaneous variability.  Hyperventilation and photic stimulation were not performed.  There is no focal slowing.  There are no interictal epileptiform discharges and no  electrographic seizures noted during the study.  EKG demonstrates normal rate.  Impression: This is an abnormal 2 to 12 hour EEG study due to the presence of diffuse theta slowing with alpha, beta and some delta slowing seen.  There are no interictal epileptiform discharges and no electrographic seizures.  These electrophysiologic findings are indicative of mild to moderate encephalopathy. In addition, beta activity is seen; this can be due to medication effect, often seen with medications such as benzodiazepines.     CT Head Without Contrast    Result Date: 2/23/2024  CT HEAD WITHOUT CONTRAST  HISTORY: Intracranial hemorrhage, follow-up.  COMPARISON: CT head 02/23/2024.  FINDINGS: A septum cavum pellucidum is noted. A small to moderate-sized supraorbital hematoma is appreciated on the right similar in appearance as compared to the prior examination. A subdural hematoma is appreciated overlying the left cerebral hemisphere anteriorly, laterally and superolaterally which has increased in attenuation as compared to 02/23/2024. It measures approximately 6 mm in thickness as measured perpendicular to the inner table on the coronal reconstructions. Subarachnoid blood is appreciated overlying the anterior and superior aspect of the left frontal lobe, unchanged. There was no evidence of midline shift, acute infarction or of intra-axial hemorrhage.      The pre-existing subdural hematoma overlying the left cerebral hemisphere is unchanged in size measuring approximately 6 mm in thickness. It has increased in attenuation however. Subarachnoid blood overlying the left frontal lobe is noted, present previously and unchanged. Continued surveillance is recommended.  Radiation dose reduction techniques were utilized, including automated exposure control and exposure modulation based on body size.   This report was finalized on 2/23/2024 4:49 PM by Dr. Cj Cole M.D on Workstation: [id number]      Duplex Portal Hepatic Complete  CAR    Result Date: 2/23/2024    All vessels appear normal with normal flow direction and no evidence of thrombus.     CT Abdomen Pelvis Without Contrast    Result Date: 2/23/2024  CT SCAN OF THE ABDOMEN AND PELVIS WITHOUT CONTRAST  HISTORY: Subarachnoid hemorrhage. Nausea and vomiting.  FINDINGS: The CT scan was performed through the abdomen and pelvis without contrast. The following findings are present: 1. The lung bases are clear. There is extensive low density throughout the liver consistent with severe hepatic steatosis. No focal liver lesions are seen. The spleen is not enlarged. The gallbladder shows no gallstones or wall thickening. 2. There is some vague increased density and suggestion of slight haziness in the region of the head of pancreas and suspicious for changes of mild acute pancreatitis and further clinical correlation is recommended. 3. The adrenal glands and both kidneys are unremarkable. There is no aortic aneurysm or retroperitoneal lymphadenopathy. The large and small bowel loops are normal in caliber and show no inflammatory change. In the pelvis, the urinary bladder has a smooth contour.   Radiation dose reduction techniques were utilized, including automated exposure control and exposure modulation based on body size.   This report was finalized on 2/23/2024 12:27 PM by Dr. Sg Pirce M.D on Workstation: [id number]      CT Head Without Contrast    Result Date: 2/23/2024  CT OF THE HEAD WITHOUT CONTRAST; CT OF THE CERVICAL SPINE  HISTORY: Seizure  COMPARISON: February 22, 2024  TECHNIQUE: Axial CT imaging was obtained through the brain. No IV contrast was administered. Axial CT imaging was obtained through the cervical spine. Coronal and sagittal reformatted images were obtained.  FINDINGS: CT OF THE HEAD: The patient has a mixed density (although predominantly hyperdense) subdural hematoma overlying the left cerebral convexity. Maximum thickness is 1.1 cm. It was not visible on the  earlier study. There is midline shift, measuring up to 6 mm. There is trace left parafalcine subdural hemorrhage, measuring up to 2 mm, likely also a trace amount overlying the left tentorium. The patient also appears to have a small amount of subarachnoid hemorrhage. No calvarial fracture is seen. Mucosal thickening is noted within the ethmoid sinuses. There is a right supraorbital scalp hematoma/laceration. Right globe is intact. There is no post septal extension.  CT OF THE CERVICAL SPINE: No acute fracture or subluxation of the cervical spine is seen. Cervical vertebral body alignment appears within normal limits. There is no prevertebral soft tissue swelling. Images through the lung apices demonstrate emphysematous changes, which is an advanced finding for the age of 29. Thyroid gland and trachea are within normal limits.       1. Left cerebral convexity subdural hematoma with a maximum thickness of 1.1 cm. Midline shift of 6 mm. There is also trace subarachnoid hemorrhage, as well as trace left parafalcine and left tentorial hemorrhage. 2. No acute fracture or subluxation of the cervical spine is seen.  FINDINGS were discussed with Dr. Mckeon at 12:52 a.m.    Radiation dose reduction techniques were utilized, including automated exposure control and exposure modulation based on body size.   This report was finalized on 2/23/2024 12:55 AM by Dr. Alexa Ferrer M.D on Workstation: [id number]      CT Cervical Spine Without Contrast    Result Date: 2/23/2024  CT OF THE HEAD WITHOUT CONTRAST; CT OF THE CERVICAL SPINE  HISTORY: Seizure  COMPARISON: February 22, 2024  TECHNIQUE: Axial CT imaging was obtained through the brain. No IV contrast was administered. Axial CT imaging was obtained through the cervical spine. Coronal and sagittal reformatted images were obtained.  FINDINGS: CT OF THE HEAD: The patient has a mixed density (although predominantly hyperdense) subdural hematoma overlying the left cerebral  convexity. Maximum thickness is 1.1 cm. It was not visible on the earlier study. There is midline shift, measuring up to 6 mm. There is trace left parafalcine subdural hemorrhage, measuring up to 2 mm, likely also a trace amount overlying the left tentorium. The patient also appears to have a small amount of subarachnoid hemorrhage. No calvarial fracture is seen. Mucosal thickening is noted within the ethmoid sinuses. There is a right supraorbital scalp hematoma/laceration. Right globe is intact. There is no post septal extension.  CT OF THE CERVICAL SPINE: No acute fracture or subluxation of the cervical spine is seen. Cervical vertebral body alignment appears within normal limits. There is no prevertebral soft tissue swelling. Images through the lung apices demonstrate emphysematous changes, which is an advanced finding for the age of 29. Thyroid gland and trachea are within normal limits.       1. Left cerebral convexity subdural hematoma with a maximum thickness of 1.1 cm. Midline shift of 6 mm. There is also trace subarachnoid hemorrhage, as well as trace left parafalcine and left tentorial hemorrhage. 2. No acute fracture or subluxation of the cervical spine is seen.  FINDINGS were discussed with Dr. Mckeon at 12:52 a.m.    Radiation dose reduction techniques were utilized, including automated exposure control and exposure modulation based on body size.   This report was finalized on 2/23/2024 12:55 AM by Dr. Alexa Ferrer M.D on Workstation: [id number]      XR Chest 1 View    Result Date: 2/22/2024  XR CHEST 1 VW-  HISTORY: 29-year-old male with seizure.  FINDINGS: There is no evidence for pneumonia, effusions, CHF, or pneumothorax.  This report was finalized on 2/22/2024 8:45 PM by Dr. Kimmy Carmona M.D on Workstation: [id number]      CT Head Without Contrast    Result Date: 2/22/2024  CT HEAD WO CONTRAST-  INDICATIONS: Seizure  TECHNIQUE: Radiation dose reduction techniques were utilized, including  automated exposure control and exposure modulation based on body size. Noncontrast head CT  COMPARISON: None available  FINDINGS:    No acute intracranial hemorrhage, midline shift or mass effect. No acute territorial infarct is identified.   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 Ventricles, cisterns, cerebral sulci are otherwise unremarkable for patient age.  The visualized paranasal sinuses, orbits, mastoid air cells are unremarkable.        No acute intracranial hemorrhage or hydrocephalus. Cyst of the septum pellucidum.  For further evaluation of the cause of patient's seizure, if indicated, enhanced MRI is advised.  This report was finalized on 2/22/2024 8:41 PM by Dr. Albert Moran M.D on Workstation: [id number]              Active Hospital Problems    Diagnosis  POA    **Subdural hematoma [S06.5XAA]  Yes      Resolved Hospital Problems   No resolved problems to display.         Assessment & Plan     Intracranial hemorrhage with both subdural and subarachnoid hemorrhage traumatic left cerebral hemisphere.  Neurosurgery.  Stable from their standpoint for discharge will see him in 2 weeks in our office with follow-up CT scan  Seizure most likely alcohol withdrawal although with the trauma and bleed discussed with neurology and they recommend continuing Keppra for several months and following up with them as an outpatient in a few months.  Neurology has reviewed seizure precautions with the patient and his family  Alcohol abuse and  alcohol withdrawal patient on CIWA protocol and thiamine.  Patient is pretty confused today in addition to benzodiazepines they had to add dexmedetomidine drip last night.  He will need to look into outpatient treatment when he is ready for discharge.  A-fib with rapid ventricular response reported I do not see any EKGs with any definite A-fib documented he is sinus now we will continue to monitor  Alcoholic  ketoacidosis improving  Acute alcoholic hepatitis as hepatitis panel negative and hepatic Doppler negative.  He is starting to improve.  Discriminant function does not suggest steroids warranted at this time.  Continue to follow  Tobacco abuse apparently patient is a smoker asking for cigarettes will put a NicoDerm patch on.  Anemia macrocytic probably deficient on replacement  Hyperglycemia  Fluids/electrolytes/nutrition hyponatremia resolved  Thrombocytopenia suspect alcoholic marrow suppression improving.  Elevated procalcitonin it is falling normal white count ,afebrile no clear infection source we will continue to follow.  Infection.  Laceration over right eye with multiple sutures skin a large ecchymoses no signs of infection the edema has gone down significantly.    Plan for disposition:    Caleb Tena Jr, MD  02/26/24  07:01 EST    Time: Critical care time 34 minutes

## 2024-02-26 NOTE — CONSULTS
"REASON FOR ACCESS CONSULT:     ETOH    HPI:  Pt was at work and had a seizure and was brought to the ED. Just as he was about to leave the ED for discharge he fell to the floor with another seizure and hit his head resulting in a subdural hematoma. He was admitted to the ICU. Both ETOH level and UDS negative at time of admission.    For this assessment, writer introduced self and explained role. The pt then asked, \"I would like to know the relevance of these questions?\". Writer further explained [facility] role and he replied, \"What doctor wants to know?  They are uneducated... but go ahead and ask your questions \".     He is annoyed by writer's assessment questions. Pt is evasive in his answers. His affect is flat. He is irritable. Speech is of slow rate/soft volume. He is oriented to self, location, year, POTUS. Thought process is mainly organized and linear with poverty of content. At this time his insight and judgement is poor regards his current situation. Primary RN also reports \"pt will be chatting it up, then he will shut down, gets defensive\".    Pt denied feelings of depression and anxiety - asked him to rate it on a scale of 0-10 (10 being worst), and he answered \"0\" to both. He denied SI/HI/AVH currently or in recent past. Writer asked, \"Are you contemplating suicide?\" - pt rolled eyes and stated, \"No, I'm contemplating going back to work\".     Most current CIWA=4 over the 1400 hr. Highest CIWA 13 thus far was yesterday. He denied s/s of withdrawal. He denied any cravings for ETOH.    SUBSTANCE USE HX:  Pt either poor historian, is unable to recall, or is being evasive in his answers; so a thorough ETOH hx unable to be obtained:    He stated, \"I'm a social drinker... it depends on the week\". He denied heavy alcohol use. He stated his last drink was \"on [birth year]\". However, per chart review, pt stated in ED he drank the night prior to his coming in. Another note here this hospitalization " "pt stated he drinks liquor everyday.     Longest sober - \"that's personal\". He stated he completed a program at [facility] for nine months last year; however, denied it was for ETOH and denied it was for drug use.  Stated he is active in AA, even sponsoring others. Last meeting was \"last Thursday\".     Pt denied blackouts. Stated he has one seizure in the past related to ETOH, \"not sure, doctor couldn't figure it out\". Has been hospitalized x 2 at least for ETOH complication.    He denied current drug use. Hx of THC use. He smokes cigs - amount \"depends\".     MENTAL HEALTH HX:  Pt denied psychotropic use, psych hospitalizations, suicide attempts/self-harm. Per pt, he is not under the care of a psych provider.     Hx of legalities, hx of being jailed, current supervised release.    SOCIAL HX:   Pt stated he is single and has no children. Stated he is living by himself \"off [facility]\". Stated he has a bachelor's degree. 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 Support system - \"not really\".     PLAN:  Pt denied ETOH use is problematic. He denied current consequences related to the use of ETOH. He denied the need for outpatient [facility] or MH resources. States he will continue with AA. Pt asked that interview be completed. Updated primary RN. Access will follow briefly.  "

## 2024-02-26 NOTE — THERAPY TREATMENT NOTE
Patient Name: Nida Peck  : 1995    MRN: [id number]                              Today's Date: 2024       Admit Date: 2024    Visit Dx:     ICD-10-CM ICD-9-CM   1. Subdural hematoma  S06.5XAA 432.1   2. Seizure-like activity  R56.9 780.39   3. Hypomagnesemia  E83.42 275.2   4. Hypokalemia  E87.6 276.8   5. Elevated liver enzymes  R74.8 790.5   6. Macrocytic anemia  D53.9 281.9   7. Hyperglycemia  R73.9 790.29   8. Thrombocytopenia  D69.6 287.5   9. Laceration of forehead, initial encounter  S01.81XA 873.42   10. Subarachnoid bleed  I60.9 430     Patient Active Problem List   Diagnosis    Subdural hematoma     History reviewed. No pertinent past medical history.  History reviewed. No pertinent surgical history.   General Information       Row Name 24 1243          OT Time and Intention    Document Type therapy note (daily note)  -SM     Mode of Treatment occupational therapy;co-treatment  2/2 safety concerns, ICU status/medical complexity to safely progress activity from previous therapy sessions.  -       Row Name 24 1243          General Information    Patient Profile Reviewed yes  -SM     Existing Precautions/Restrictions fall  -       Row Name 24 1243          Cognition    Orientation Status (Cognition) oriented x 3;disoriented to;situation  slow processing, slow to respond  -       Row Name 24 1243          Safety Issues, Functional Mobility    Safety Issues Affecting Function (Mobility) insight into deficits/self-awareness;safety precaution awareness;judgment  -     Impairments Affecting Function (Mobility) cognition;balance;endurance/activity tolerance;strength  -     Comment, Safety Issues/Impairments (Mobility) PT/OT cotreatment medically appropriate and necessary due to patient acuity level, to maximize therapeutic benefit due to impaired act tolerance, and for safety of patient and staff. Treatment focused on progression of care and goals established in  POC.  -               User Key  (r) = Recorded By, (t) = Taken By, (c) = Cosigned By      Initials Name Provider Type    Rama Del Rio OT Occupational Therapist                     Mobility/ADL's       Row Name 02/26/24 1245          Bed Mobility    Supine-Sit Russell (Bed Mobility) contact guard;verbal cues;nonverbal cues (demo/gesture)  -[facility] Name 02/26/24 1245          Sit-Stand Transfer    Sit-Stand Russell (Transfers) minimum assist (75% patient effort);2 person assist;verbal cues;nonverbal cues (demo/gesture)  -[facility] Name 02/26/24 1245          Functional Mobility    Functional Mobility- Ind. Level contact guard assist;2 person assist required  -     Functional Mobility- Comment HHAX2, very slow pace but no LOB today. Pt is very drowsy but able to mobilize in room, bathroom distance today  -[facility] Name 02/26/24 1245          Toileting Assessment/Training    Comment, (Toileting) purewick leaking in bed, encouraged to call out for bathroom needs and demonstrated use of urinal for pt  -[facility] Name 02/26/24 1245          Lower Body Dressing Assessment/Training    Russell Level (Lower Body Dressing) don;socks;standby assist  -     Position (Lower Body Dressing) edge of bed sitting  -     Comment, (Lower Body Dressing) extra time  -[facility] Name 02/26/24 1245          Self-Feeding Assessment/Training    Russell Level (Feeding) set up  -     Position (Self-Feeding) supported sitting  -               User Key  (r) = Recorded By, (t) = Taken By, (c) = Cosigned By      Initials Name Provider Type    Rama Del Rio OT Occupational Therapist                   Obj/Interventions       Row Name 02/26/24 1247          Shoulder (Therapeutic Exercise)    Shoulder (Therapeutic Exercise) AROM (active range of motion)  -     Shoulder AROM (Therapeutic Exercise) bilateral;extension;10 repetitions;sitting  slow pace, generally weak  -[facility] Name  02/26/24 1247          Motor Skills    Therapeutic Exercise shoulder  -       Row Name 02/26/24 1247          Balance    Static Sitting Balance standby assist  -     Dynamic Sitting Balance contact guard  -     Position, Sitting Balance sitting edge of bed  -     Static Standing Balance contact guard  -SM     Dynamic Standing Balance contact guard;minimal assist  -     Position/Device Used, Standing Balance supported  -               User Key  (r) = Recorded By, (t) = Taken By, (c) = Cosigned By      Initials Name Provider Type    Rama Del Rio OT Occupational Therapist                   Goals/Plan    No documentation.                  Clinical Impression       Row Name 02/26/24 1248          Pain Assessment    Pretreatment Pain Rating 0/10 - no pain  -     Posttreatment Pain Rating 0/10 - no pain  -SM       Row Name 02/26/24 1248          Plan of Care Review    Plan of Care Reviewed With patient  -     Progress improving  -     Outcome Evaluation Pt is awake sitting up in bed but can be slow to respond, drowsy. He is increasing mobility today and is able to complete ADLs with less assistance but does require extra time. He is generally weak. Encouraged pt to sit up in chair while he ate breakfast and call for nsg assistance up to bathroom vs C to increase OOB activity as he is able. Pt lives alone. Pt may benefit from cog eval from SLP as medical status becomes more stable from alcohol withdraw. Will continue to progress as tolerated for rehab needs vs home with family/HH.  -       Row Name 02/26/24 1248          Therapy Plan Review/Discharge Plan (OT)    Anticipated Discharge Disposition (OT) inpatient rehabilitation facility  pending progress, may be able to dc home with family support  -       Row Name 02/26/24 1248          Vital Signs    Pre Systolic BP Rehab 128  -SM     Pre Treatment Diastolic BP 91  -SM     Post Systolic BP Rehab 144  -SM     Post Treatment Diastolic    -SM     Pretreatment Heart Rate (beats/min) 80  -SM     Posttreatment Heart Rate (beats/min) 71  -SM     Pre SpO2 (%) 94  -SM     O2 Delivery Pre Treatment room air  -SM     Post SpO2 (%) 100  -SM     O2 Delivery Post Treatment room air  -       Row Name 02/26/24 1248          Positioning and Restraints    Pre-Treatment Position in bed  -SM     Post Treatment Position chair  -SM     In Chair reclined;call light within reach;encouraged to call for assist;exit alarm on;notified nsg  -               User Key  (r) = Recorded By, (t) = Taken By, (c) = Cosigned By      Initials Name Provider Type    Rama Del Rio, OT Occupational Therapist                   Outcome Measures       Row Name 02/26/24 1252          How much help from another is currently needed...    Putting on and taking off regular lower body clothing? 3  -SM     Bathing (including washing, rinsing, and drying) 3  -SM     Toileting (which includes using toilet bed pan or urinal) 2  -SM     Putting on and taking off regular upper body clothing 3  -SM     Taking care of personal grooming (such as brushing teeth) 3  -SM     Eating meals 3  -SM     AM-PAC 6 Clicks Score (OT) 17  -SM       Row Name 02/26/24 1207 02/26/24 0800       How much help from another person do you currently need...    Turning from your back to your side while in flat bed without using bedrails? 4  -MS 4  -AB    Moving from lying on back to sitting on the side of a flat bed without bedrails? 4  -MS 4  -AB    Moving to and from a bed to a chair (including a wheelchair)? 3  -MS 3  -AB    Standing up from a chair using your arms (e.g., wheelchair, bedside chair)? 3  -MS 3  -AB    Climbing 3-5 steps with a railing? 2  -MS 2  -AB    To walk in hospital room? 3  -MS 2  -AB    AM-PAC 6 Clicks Score (PT) 19  -MS 18  -AB    Highest Level of Mobility Goal 6 --> Walk 10 steps or more  -MS 6 --> Walk 10 steps or more  -AB      Row Name 02/26/24 1252          Functional Assessment     Outcome Measure Options AM-PAC 6 Clicks Daily Activity (OT)  -SM               User Key  (r) = Recorded By, (t) = Taken By, (c) = Cosigned By      Initials Name Provider Type    Cecelia Pyel, PT Physical Therapist    Rama Del Rio OT Occupational Therapist    Jeremías Lockwood, RN Registered Nurse                      OT Recommendation and Plan     Plan of Care Review  Plan of Care Reviewed With: patient  Progress: improving  Outcome Evaluation: Pt is awake sitting up in bed but can be slow to respond, drowsy. He is increasing mobility today and is able to complete ADLs with less assistance but does require extra time. He is generally weak. Encouraged pt to sit up in chair while he ate breakfast and call for nsg assistance up to bathroom vs BSC to increase OOB activity as he is able. Pt lives alone. Pt may benefit from cog eval from SLP as medical status becomes more stable from alcohol withdraw. Will continue to progress as tolerated for rehab needs vs home with family/HH.     Time Calculation:         Time Calculation- OT       Row Name 02/26/24 1252             Time Calculation- OT    OT Start Time 0842  -      OT Stop Time 0905  -      OT Time Calculation (min) 23 min  -SM      Total Timed Code Minutes- OT 23 minute(s)  -SM      OT Received On 02/26/24  -      OT - Next Appointment 02/27/24  -SM         Timed Charges    24878 - OT Therapeutic Activity Minutes 11  -SM      19434 - OT Self Care/Mgmt Minutes 12  -SM         Total Minutes    Timed Charges Total Minutes 23  -SM       Total Minutes 23  -SM                User Key  (r) = Recorded By, (t) = Taken By, (c) = Cosigned By      Initials Name Provider Type    Rama Del Rio OT Occupational Therapist                  Therapy Charges for Today       Code Description Service Date Service Provider Modifiers Qty    85595165250  OT THERAPEUTIC ACT EA 15 MIN 2/26/2024 Rama Holguin OT GO 1    14348258828  OT SELF  CARE/MGMT/TRAIN EA 15 MIN 2/26/2024 Rama Holguin, OT GO 1                 Rama Holguin OT  2/26/2024

## 2024-02-26 NOTE — THERAPY TREATMENT NOTE
Acute Care - Speech Language Pathology   Swallow Treatment Note [facility]     Patient Name: Nida Peck  : 1995  MRN: [id number]  Today's Date: 2024               Admit Date: 2024    Visit Dx:     ICD-10-CM ICD-9-CM   1. Subdural hematoma  S06.5XAA 432.1   2. Seizure-like activity  R56.9 780.39   3. Hypomagnesemia  E83.42 275.2   4. Hypokalemia  E87.6 276.8   5. Elevated liver enzymes  R74.8 790.5   6. Macrocytic anemia  D53.9 281.9   7. Hyperglycemia  R73.9 790.29   8. Thrombocytopenia  D69.6 287.5   9. Laceration of forehead, initial encounter  S01.81XA 873.42   10. Subarachnoid bleed  I60.9 430     Patient Active Problem List   Diagnosis    Subdural hematoma     History reviewed. No pertinent past medical history.  History reviewed. No pertinent surgical history.    SLP Recommendation and Plan                                                                               Plan of Care Reviewed With: patient  Outcome Evaluation: Pt seen for diet tolerance with lunch. Pt had lunch on tray as SLP entered room. Pt with decreased initiation to start the lunch as it had been sitting there for awhile. SLP encouraged pt to eat and assisted with set up. Pt ate regular diet and thin with no s/s. Will s/o from dysphagia therapy, but follow pt for a cog eval. Pt denied deficits, however, slow processing is evident. Will proceed with eval as able.      SWALLOW EVALUATION (last 72 hours)       SLP Adult Swallow Evaluation       Row Name 24 1300                   Rehab Evaluation    Document Type therapy note (daily note)  -AW        Subjective Information no complaints  -AW        Patient Observations alert;cooperative;agree to therapy  -AW        Patient Effort good  -AW           (STG) Patient will tolerate trials of    Consistencies Trialed (Tolerate trials) regular textures;thin liquids  -AW        Progress/Outcomes (Tolerate trials) goal met  -AW        Comment (Tolerate trials) Pt seen for diet  tolerance with lunch. Pt had lunch on tray as SLP entered room. Pt with decreased initiation to start the lunch as it had been sitting there for awhile. SLP encouraged pt to eat and assisted with set up. Pt ate regular diet and thin with no s/s. Will s/o from dysphagia therapy, but follow pt for a cog eval. Pt denied deficits, however, slow processing is evident. Will proceed with eval as able.  -AW                  User Key  (r) = Recorded By, (t) = Taken By, (c) = Cosigned By      Initials Name Effective Dates    Laxmi Pillai SLP 08/28/23 -                     EDUCATION  The patient has been educated in the following areas:   Dysphagia (Swallowing Impairment) Oral Care/Hydration.        SLP GOALS       Row Name 02/26/24 1300             (STG) Patient will tolerate trials of    Consistencies Trialed (Tolerate trials) regular textures;thin liquids  -AW      Progress/Outcomes (Tolerate trials) goal met  -AW      Comment (Tolerate trials) Pt seen for diet tolerance with lunch. Pt had lunch on tray as SLP entered room. Pt with decreased initiation to start the lunch as it had been sitting there for awhile. SLP encouraged pt to eat and assisted with set up. Pt ate regular diet and thin with no s/s. Will s/o from dysphagia therapy, but follow pt for a cog eval. Pt denied deficits, however, slow processing is evident. Will proceed with eval as able.  -AW                User Key  (r) = Recorded By, (t) = Taken By, (c) = Cosigned By      Initials Name Provider Type    Laxmi Pillai SLP Speech and Language Pathologist                       Time Calculation:    Time Calculation- SLP       Row Name 02/26/24 1723             Time Calculation- SLP    SLP Start Time 1300  -AW      SLP Received On 02/26/24  -AW                User Key  (r) = Recorded By, (t) = Taken By, (c) = Cosigned By      Initials Name Provider Type    Laxmi Pillai SLP Speech and Language Pathologist                    Therapy Charges for Today        Code Description Service Date Service Provider Modifiers Qty    05555688279 HC ST TREATMENT SWALLOW 3 2/26/2024 Laxmi Amaya, SLP GN 1                 Laxmi Amaya, SLP  2/26/2024

## 2024-02-26 NOTE — PLAN OF CARE
Problem: Adult Inpatient Plan of Care  Goal: Plan of Care Review  Outcome: Ongoing, Progressing  Goal: Patient-Specific Goal (Individualized)  Outcome: Ongoing, Progressing  Goal: Absence of Hospital-Acquired Illness or Injury  Outcome: Ongoing, Progressing  Intervention: Identify and Manage Fall Risk  Recent Flowsheet Documentation  Taken 2/25/2024 2000 by Eduardo Patel RN  Safety Promotion/Fall Prevention:   activity supervised   safety round/check completed  Intervention: Prevent Infection  Recent Flowsheet Documentation  Taken 2/25/2024 2000 by Eduardo Patel RN  Infection Prevention: single patient room provided  Goal: Optimal Comfort and Wellbeing  Outcome: Ongoing, Progressing  Goal: Readiness for Transition of Care  Outcome: Ongoing, Progressing     Problem: Skin Injury Risk Increased  Goal: Skin Health and Integrity  Outcome: Ongoing, Progressing     Problem: Fall Injury Risk  Goal: Absence of Fall and Fall-Related Injury  Outcome: Ongoing, Progressing  Intervention: Identify and Manage Contributors  Recent Flowsheet Documentation  Taken 2/25/2024 2000 by Eduardo Patel RN  Medication Review/Management: medications reviewed  Intervention: Promote Injury-Free Environment  Recent Flowsheet Documentation  Taken 2/25/2024 2000 by Eduardo Patel RN  Safety Promotion/Fall Prevention:   activity supervised   safety round/check completed   Goal Outcome Evaluation:

## 2024-02-26 NOTE — TELEPHONE ENCOUNTER
October 27, 2023     Patient: Lopez Lopez   YOB: 2013   Date of Visit: 10/25/2023       To Whom It May Concern:    It is my medical opinion that Lopez Lopez {Work release (duty restriction):14648}.    If you have any questions or concerns, please don't hesitate to call.         Sincerely,        Margarita Ledesma, CCC-SLP    CC: No Recipients S/w patient's nurse and informed of CT appt and F/U with Dr. Celeste    ----- Message from Je Celeste MD sent at 2/25/2024  8:27 AM EST -----  This patient needs to be seen in the office in about 2 weeks with a follow-up CT of his head without contrast.

## 2024-02-26 NOTE — CASE MANAGEMENT/SOCIAL WORK
Discharge Planning Assessment  [facility]     Patient Name: Nida Peck  MRN: [id number]  Today's Date: 2/26/2024    Admit Date: 2/22/2024    Plan: [facility] evaluating   Discharge Needs Assessment       Row Name 02/26/24 1324       Living Environment    People in Home friend(s)    Current Living Arrangements apartment    Potentially Unsafe Housing Conditions none    Primary Care Provided by self    Provides Primary Care For no one    Family Caregiver if Needed none    Quality of Family Relationships helpful;involved;supportive    Able to Return to Prior Arrangements yes       Resource/Environmental Concerns    Transportation Concerns no car       Transition Planning    Patient/Family Anticipates Transition to inpatient rehabilitation facility    Transportation Anticipated health plan transportation;family or friend will provide       Discharge Needs Assessment    Readmission Within the Last 30 Days no previous admission in last 30 days    Equipment Currently Used at Home none    Anticipated Changes Related to Illness none    Equipment Needed After Discharge none    Provided Post Acute Provider List? N/A    Provided Post Acute Provider Quality & Resource List? N/A                   Discharge Plan       Row Name 02/26/24 1324       Plan    Plan [facility] evaluating    Patient/Family in Agreement with Plan yes    Plan Comments CCP contacted the patients mother Margoth. CCP introduced self and explained role. Patient mother confirmed the information on the patients face sheet. Patient is enrolled in [employer]. Patient lives with a friend. No history of HH or SNF. Patient was independent before coming to hospital. Therapies recommend inpatient rehab. Discussed this with the patient mother and Grandmother Cuco Huertaprem ([phone]). Discussed [facility] and [facility] rehab. Family agreeable to referral to [facility]. CCP contacted [facility] and initiated a full referral. CCP to follow.                   Continued Care and Services - Admitted Since 2/22/2024    Coordination has not been started for this encounter.          Demographic Summary       Row Name 02/26/24 1323       General Information    Admission Type inpatient    Arrived From emergency department    Referral Source admission list    Reason for Consult discharge planning    Preferred Language English                   Functional Status       Row Name 02/26/24 1323       Functional Status    Usual Activity Tolerance good    Current Activity Tolerance good       Functional Status, IADL    Medications independent    Meal Preparation independent    Housekeeping independent    Laundry independent    Shopping independent       Mental Status    General Appearance WDL WDL       Mental Status Summary    Recent Changes in Mental Status/Cognitive Functioning mental status       Employment/    Employment Status employed full-time                   Psychosocial    No documentation.                  Abuse/Neglect    No documentation.                  Legal    No documentation.                  Substance Abuse    No documentation.                  Patient Forms    No documentation.

## 2024-02-26 NOTE — THERAPY TREATMENT NOTE
Patient Name: Nida Peck  : 1995    MRN: [id number]                              Today's Date: 2024       Admit Date: 2024    Visit Dx:     ICD-10-CM ICD-9-CM   1. Subdural hematoma  S06.5XAA 432.1   2. Seizure-like activity  R56.9 780.39   3. Hypomagnesemia  E83.42 275.2   4. Hypokalemia  E87.6 276.8   5. Elevated liver enzymes  R74.8 790.5   6. Macrocytic anemia  D53.9 281.9   7. Hyperglycemia  R73.9 790.29   8. Thrombocytopenia  D69.6 287.5   9. Laceration of forehead, initial encounter  S01.81XA 873.42   10. Subarachnoid bleed  I60.9 430     Patient Active Problem List   Diagnosis    Subdural hematoma     History reviewed. No pertinent past medical history.  History reviewed. No pertinent surgical history.   General Information       Row Name 24 1128          Physical Therapy Time and Intention    Document Type therapy note (daily note)  -MS     Mode of Treatment co-treatment;physical therapy  -MS       Row Name 24 1128          Cognition    Orientation Status (Cognition) oriented to;person;place;time  intermittent confusion, delayed processing time, VCs for task initiation  -MS       Row Name 24 1128          Safety Issues, Functional Mobility    Safety Issues Affecting Function (Mobility) judgment;sequencing abilities;ability to follow commands;insight into deficits/self-awareness;at risk behavior observed  -MS     Impairments Affecting Function (Mobility) balance;strength;endurance/activity tolerance;postural/trunk control  -MS     Comment, Safety Issues/Impairments (Mobility) Co treatment medically appropriate and necessary due to patient acuity level, activity tolerance and safety of patient and staff. Treatment is focusing on progression of care and goals established in the POC.  -MS               User Key  (r) = Recorded By, (t) = Taken By, (c) = Cosigned By      Initials Name Provider Type    Cecelia Pyle PT Physical Therapist                   Mobility        Row Name 02/26/24 1128          Bed Mobility    Supine-Sit [place] (Bed Mobility) contact guard;verbal cues;nonverbal cues (demo/gesture)  -MS     Assistive Device (Bed Mobility) bed rails;head of bed elevated  -MS     Comment, (Bed Mobility) Incr time needed but able to complete.  -MS       Row Name 02/26/24 1128          Sit-Stand Transfer    Sit-Stand [place] (Transfers) minimum assist (75% patient effort);2 person assist;verbal cues;nonverbal cues (demo/gesture)  -MS     Assistive Device (Sit-Stand Transfers) --  HHAx2  -MS       Row Name 02/26/24 1128          Gait/Stairs (Locomotion)    [place] Level (Gait) contact guard;minimum assist (75% patient effort)  -MS     Assistive Device (Gait) --  HHAx2  -MS     Distance in Feet (Gait) 50'  -MS     Deviations/Abnormal Patterns (Gait) ataxic;base of support, wide;gait speed decreased;dennis decreased  -MS     Bilateral Gait Deviations forward flexed posture  -MS     Comment, (Gait/Stairs) Minimally unsteady. No overt LOB or veering noted. Short shuffled steps- agreeable to sitting up in chair post PT.  -MS               User Key  (r) = Recorded By, (t) = Taken By, (c) = Cosigned By      Initials Name Provider Type    Cecelia Pyle, PT Physical Therapist                   Obj/Interventions       Row Name 02/26/24 1131          Motor Skills    Therapeutic Exercise --  Seated LAQs and APs x 10 reps  -MS       Row Name 02/26/24 1131          Balance    Static Sitting Balance contact guard  -MS     Position, Sitting Balance sitting edge of bed  -MS     Static Standing Balance contact guard;minimal assist  -MS     Position/Device Used, Standing Balance supported  -MS               User Key  (r) = Recorded By, (t) = Taken By, (c) = Cosigned By      Initials Name Provider Type    Cecelia Pyle, PT Physical Therapist                   Goals/Plan    No documentation.                  Clinical Impression       Row Name 02/26/24 1132           Pain    Pretreatment Pain Rating 0/10 - no pain  -MS     Posttreatment Pain Rating 0/10 - no pain  -MS       Row Name 02/26/24 1132          Plan of Care Review    Plan of Care Reviewed With patient  -MS     Outcome Evaluation Patient pleasant and agreeable to PT/OT cotreatment- continues to be monitored in the ICU. Today, patient was able to achieve EOB with CGA, stood with minAx2 and ambulated a total of 50' CGA-To HHAx2. Minimally unsteady throughout but no overt LOB. Patient agreeable to sitting UIC after therapy. PT will continue to follow and advance as able.  -MS       Row Name 02/26/24 1132          Vital Signs    Pretreatment Heart Rate (beats/min) 72  -MS     O2 Delivery Pre Treatment room air  -MS       Row Name 02/26/24 1132          Positioning and Restraints    Pre-Treatment Position in bed  -MS     Post Treatment Position chair  -MS     In Chair notified nsg;reclined;call light within reach;encouraged to call for assist;exit alarm on  -MS               User Key  (r) = Recorded By, (t) = Taken By, (c) = Cosigned By      Initials Name Provider Type    MS Barrientos Cecelia A, PT Physical Therapist                   Outcome Measures       Row Name 02/26/24 1207 02/26/24 0800       How much help from another person do you currently need...    Turning from your back to your side while in flat bed without using bedrails? 4  -MS 4  -AB    Moving from lying on back to sitting on the side of a flat bed without bedrails? 4  -MS 4  -AB    Moving to and from a bed to a chair (including a wheelchair)? 3  -MS 3  -AB    Standing up from a chair using your arms (e.g., wheelchair, bedside chair)? 3  -MS 3  -AB    Climbing 3-5 steps with a railing? 2  -MS 2  -AB    To walk in hospital room? 3  -MS 2  -AB    AM-PAC 6 Clicks Score (PT) 19  -MS 18  -AB    Highest Level of Mobility Goal 6 --> Walk 10 steps or more  -MS 6 --> Walk 10 steps or more  -AB              User Key  (r) = Recorded By, (t) = Taken By, (c) = Cosigned  By      Initials Name Provider Type    MS FloydCecelia, PT Physical Therapist    Jeremías Lockwood RN Registered Nurse                                 Physical Therapy Education       Title: PT OT SLP Therapies (Done)       Topic: Physical Therapy (Done)       Point: Mobility training (Done)       Learning Progress Summary             Patient Acceptance, E,TB, VU by MS at 2/26/2024 1207    Acceptance, E, NR by  at 2/24/2024 1514                         Point: Home exercise program (Done)       Learning Progress Summary             Patient Acceptance, E,TB, VU by MS at 2/26/2024 1207    Acceptance, E, NR by  at 2/23/2024 1030                         Point: Body mechanics (Done)       Learning Progress Summary             Patient Acceptance, E,TB, VU by MS at 2/26/2024 1207    Acceptance, E, NR by  at 2/24/2024 1514    Acceptance, E, NR by  at 2/23/2024 1030                         Point: Precautions (Done)       Learning Progress Summary             Patient Acceptance, E,TB, VU by MS at 2/26/2024 1207    Acceptance, E, NR by  at 2/24/2024 1514    Acceptance, E, NR by  at 2/23/2024 1030                                         User Key       Initials Effective Dates Name Provider Type Discipline    MS 06/16/21 -  Cecelia Barrientos, PT Physical Therapist PT     10/25/19 -  Saida Shen, PT Physical Therapist PT     05/02/22 -  Lisa Tyson, PT Physical Therapist PT                  PT Recommendation and Plan     Plan of Care Reviewed With: patient  Outcome Evaluation: Patient pleasant and agreeable to PT/OT cotreatment- continues to be monitored in the ICU. Today, patient was able to achieve EOB with CGA, stood with minAx2 and ambulated a total of 50' CGA-To HHAx2. Minimally unsteady throughout but no overt LOB. Patient agreeable to sitting UIC after therapy. PT will continue to follow and advance as able.     Time Calculation:         PT Charges       Row Name 02/26/24 8454              Time Calculation    Start Time 0842  -MS      Stop Time 0905  -MS      Time Calculation (min) 23 min  -MS      PT Received On 02/26/24  -MS      PT - Next Appointment 02/27/24  -MS                User Key  (r) = Recorded By, (t) = Taken By, (c) = Cosigned By      Initials Name Provider Type    Cecelia Pyle, PT Physical Therapist                  Therapy Charges for Today       Code Description Service Date Service Provider Modifiers Qty    69642686745  PT THER PROC EA 15 MIN 2/26/2024 Cecelia Barrientos, PT GP 1    [id number]  PT THERAPEUTIC ACT EA 15 MIN 2/26/2024 Cecelia Barrientos, PT GP 1            PT G-Codes  Outcome Measure Options: AM-PAC 6 Clicks Basic Mobility (PT)  AM-PAC 6 Clicks Score (PT): 19  AM-PAC 6 Clicks Score (OT): 15  Modified [place] Scale: 4 - Moderately severe disability.  Unable to walk without assistance, and unable to attend to own bodily needs without assistance.       Cecelia Barrientos PT  2/26/2024

## 2024-02-26 NOTE — PLAN OF CARE
Goal Outcome Evaluation:  Plan of Care Reviewed With: patient        Progress: improving  Outcome Evaluation: Pt is awake sitting up in bed but can be slow to respond, drowsy. He is increasing mobility today and is able to complete ADLs with less assistance but does require extra time. He is generally weak. Encouraged pt to sit up in chair while he ate breakfast and call for nsg assistance up to bathroom vs BSC to increase OOB activity as he is able. Pt lives alone. Pt may benefit from cog eval from SLP as medical status becomes more stable from alcohol withdraw. Will continue to progress as tolerated for rehab needs vs home with family/HH.      Anticipated Discharge Disposition (OT): inpatient rehabilitation facility (pending progress, may be able to dc home with family support)

## 2024-02-27 LAB
ALBUMIN SERPL-MCNC: 3.5 G/DL (ref 3.5–5.2)
ALBUMIN/GLOB SERPL: 1.5 G/DL
ALP SERPL-CCNC: 220 U/L (ref 39–117)
ALT SERPL W P-5'-P-CCNC: 59 U/L (ref 1–41)
ANION GAP SERPL CALCULATED.3IONS-SCNC: 11 MMOL/L (ref 5–15)
AST SERPL-CCNC: 138 U/L (ref 1–40)
BILIRUB SERPL-MCNC: 0.6 MG/DL (ref 0–1.2)
BUN SERPL-MCNC: 5 MG/DL (ref 6–20)
BUN/CREAT SERPL: 5.7 (ref 7–25)
CALCIUM SPEC-SCNC: 8 MG/DL (ref 8.6–10.5)
CHLORIDE SERPL-SCNC: 106 MMOL/L (ref 98–107)
CO2 SERPL-SCNC: 22 MMOL/L (ref 22–29)
CREAT SERPL-MCNC: 0.88 MG/DL (ref 0.76–1.27)
DEPRECATED RDW RBC AUTO: 55.1 FL (ref 37–54)
EGFRCR SERPLBLD CKD-EPI 2021: 117.9 ML/MIN/1.73
ERYTHROCYTE [DISTWIDTH] IN BLOOD BY AUTOMATED COUNT: 15.1 % (ref 12.3–15.4)
GLOBULIN UR ELPH-MCNC: 2.4 GM/DL
GLUCOSE SERPL-MCNC: 91 MG/DL (ref 65–99)
HCT VFR BLD AUTO: 32.3 % (ref 37.5–51)
HGB BLD-MCNC: 10.8 G/DL (ref 13–17.7)
MCH RBC QN AUTO: 33.3 PG (ref 26.6–33)
MCHC RBC AUTO-ENTMCNC: 33.4 G/DL (ref 31.5–35.7)
MCV RBC AUTO: 99.7 FL (ref 79–97)
PHOSPHATE SERPL-MCNC: 2.3 MG/DL (ref 2.5–4.5)
PLATELET # BLD AUTO: 128 10*3/MM3 (ref 140–450)
PMV BLD AUTO: 11.1 FL (ref 6–12)
POTASSIUM SERPL-SCNC: 3.8 MMOL/L (ref 3.5–5.2)
PROT SERPL-MCNC: 5.9 G/DL (ref 6–8.5)
RBC # BLD AUTO: 3.24 10*6/MM3 (ref 4.14–5.8)
SODIUM SERPL-SCNC: 139 MMOL/L (ref 136–145)
WBC NRBC COR # BLD AUTO: 6.14 10*3/MM3 (ref 3.4–10.8)

## 2024-02-27 PROCEDURE — 25010000002 LEVETRIRACETAM PER 10 MG: Performed by: STUDENT IN AN ORGANIZED HEALTH CARE EDUCATION/TRAINING PROGRAM

## 2024-02-27 PROCEDURE — 97530 THERAPEUTIC ACTIVITIES: CPT

## 2024-02-27 PROCEDURE — 25010000002 THIAMINE HCL 200 MG/2ML SOLUTION: Performed by: INTERNAL MEDICINE

## 2024-02-27 PROCEDURE — 25810000003 DEXTROSE-NACL PER 500 ML: Performed by: INTERNAL MEDICINE

## 2024-02-27 PROCEDURE — 25010000002 THIAMINE PER 100 MG: Performed by: INTERNAL MEDICINE

## 2024-02-27 PROCEDURE — 85027 COMPLETE CBC AUTOMATED: CPT

## 2024-02-27 PROCEDURE — 80053 COMPREHEN METABOLIC PANEL: CPT

## 2024-02-27 PROCEDURE — 92523 SPEECH SOUND LANG COMPREHEN: CPT

## 2024-02-27 PROCEDURE — 84100 ASSAY OF PHOSPHORUS: CPT | Performed by: INTERNAL MEDICINE

## 2024-02-27 RX ORDER — FOLIC ACID 1 MG/1
1 TABLET ORAL DAILY
Status: DISCONTINUED | OUTPATIENT
Start: 2024-02-28 | End: 2024-02-28 | Stop reason: HOSPADM

## 2024-02-27 RX ORDER — LEVETIRACETAM 500 MG/1
500 TABLET ORAL EVERY 12 HOURS SCHEDULED
Status: DISCONTINUED | OUTPATIENT
Start: 2024-02-27 | End: 2024-02-28 | Stop reason: HOSPADM

## 2024-02-27 RX ADMIN — THIAMINE HYDROCHLORIDE 200 MG: 100 INJECTION, SOLUTION INTRAMUSCULAR; INTRAVENOUS at 06:10

## 2024-02-27 RX ADMIN — FOLIC ACID 1 MG: 5 INJECTION, SOLUTION INTRAMUSCULAR; INTRAVENOUS; SUBCUTANEOUS at 08:29

## 2024-02-27 RX ADMIN — METOPROLOL TARTRATE 25 MG: 25 TABLET, FILM COATED ORAL at 21:17

## 2024-02-27 RX ADMIN — PANTOPRAZOLE SODIUM 40 MG: 40 INJECTION, POWDER, FOR SOLUTION INTRAVENOUS at 06:10

## 2024-02-27 RX ADMIN — LEVETIRACETAM 500 MG: 500 INJECTION, SOLUTION INTRAVENOUS at 08:30

## 2024-02-27 RX ADMIN — NICOTINE 1 PATCH: 21 PATCH, EXTENDED RELEASE TRANSDERMAL at 08:28

## 2024-02-27 RX ADMIN — DEXTROSE AND SODIUM CHLORIDE 100 ML/HR: 5; 900 INJECTION, SOLUTION INTRAVENOUS at 04:03

## 2024-02-27 RX ADMIN — THIAMINE HYDROCHLORIDE 200 MG: 100 INJECTION, SOLUTION INTRAMUSCULAR; INTRAVENOUS at 14:03

## 2024-02-27 RX ADMIN — METOPROLOL TARTRATE 12.5 MG: 25 TABLET, FILM COATED ORAL at 08:28

## 2024-02-27 RX ADMIN — LEVETIRACETAM 500 MG: 500 TABLET, FILM COATED ORAL at 21:17

## 2024-02-27 RX ADMIN — Medication 10 ML: at 08:29

## 2024-02-27 NOTE — THERAPY DISCHARGE NOTE
Acute Care - Occupational Therapy Discharge  [facility]    Patient Name: Nida Peck  : 1995    MRN: [id number]                              Today's Date: 2024       Admit Date: 2024    Visit Dx:     ICD-10-CM ICD-9-CM   1. Subdural hematoma  S06.5XAA 432.1   2. Seizure-like activity  R56.9 780.39   3. Hypomagnesemia  E83.42 275.2   4. Hypokalemia  E87.6 276.8   5. Elevated liver enzymes  R74.8 790.5   6. Macrocytic anemia  D53.9 281.9   7. Hyperglycemia  R73.9 790.29   8. Thrombocytopenia  D69.6 287.5   9. Laceration of forehead, initial encounter  S01.81XA 873.42   10. Subarachnoid bleed  I60.9 430     Patient Active Problem List   Diagnosis    Subdural hematoma     History reviewed. No pertinent past medical history.  History reviewed. No pertinent surgical history.   General Information       Row Name 24 1247          OT Time and Intention    Document Type discharge treatment  -     Mode of Treatment occupational therapy;individual therapy  -       Row Name 24 1247          General Information    Patient Profile Reviewed yes  -SM     Existing Precautions/Restrictions fall  -       Row Name 24 1247          Cognition    Orientation Status (Cognition) oriented x 3;disoriented to;situation  -       Row Name 24 1247          Safety Issues, Functional Mobility    Safety Issues Affecting Function (Mobility) safety precaution awareness;insight into deficits/self-awareness  -     Impairments Affecting Function (Mobility) cognition  -               User Key  (r) = Recorded By, (t) = Taken By, (c) = Cosigned By      Initials Name Provider Type     Rama Holguin OT Occupational Therapist                   Mobility/ADL's       Row Name 24 1247          Bed Mobility    Bed Mobility bed mobility (all) activities  -     All Activities, [place] (Bed Mobility) supervision  -       Row Name 24 1247          Sit-Stand Transfer    Sit-Stand  [place] (Transfers) supervision  -SM       Row Name 02/27/24 1247          Functional Mobility    Functional Mobility- Ind. Level supervision required  -SM     Functional Mobility- Comment full lap in [place] in ICU setting, around room for item retrieval for ADL and as pt packed belongings to move into new room.  -       Row Name 02/27/24 1247          Grooming Assessment/Training    [place] Level (Grooming) oral care regimen;supervision  -SM     Position (Grooming) sink side;unsupported standing  -SM       Row Name 02/27/24 1247          Lower Body Dressing Assessment/Training    [place] Level (Lower Body Dressing) doff;don;socks;supervision  -SM     Position (Lower Body Dressing) edge of bed sitting  -SM               User Key  (r) = Recorded By, (t) = Taken By, (c) = Cosigned By      Initials Name Provider Type    Rama Del Rio OT Occupational Therapist                   Obj/Interventions       Row Name 02/27/24 1249          Balance    Static Sitting Balance supervision  -SM     Position, Sitting Balance sitting edge of bed  -SM     Static Standing Balance supervision  -SM     Dynamic Standing Balance supervision  -SM     Position/Device Used, Standing Balance unsupported  -SM               User Key  (r) = Recorded By, (t) = Taken By, (c) = Cosigned By      Initials Name Provider Type    Rama Del Rio OT Occupational Therapist                   Goals/Plan       Row Name 02/27/24 1253          Bed Mobility Goal 1 (OT)    Activity/Assistive Device (Bed Mobility Goal 1, OT) bed mobility activities, all  -SM     [place] Level/Cues Needed (Bed Mobility Goal 1, OT) modified independence  -SM     Time Frame (Bed Mobility Goal 1, OT) short term goal (STG);2 weeks  -     Progress/Outcomes (Bed Mobility Goal 1, OT) goal partially met  -SM       Row Name 02/27/24 1253          Transfer Goal 1 (OT)    Activity/Assistive Device (Transfer Goal 1, OT) transfers, all  -SM     [place]  Level/Cues Needed (Transfer Goal 1, OT) modified independence  -SM     Time Frame (Transfer Goal 1, OT) short term goal (STG);2 weeks  -SM     Progress/Outcome (Transfer Goal 1, OT) goal partially met  -SM       Row Name 02/27/24 1253          Dressing Goal 1 (OT)    Activity/Device (Dressing Goal 1, OT) dressing skills, all;upper body dressing;lower body dressing  -SM     [place]/Cues Needed (Dressing Goal 1, OT) modified independence  -SM     Time Frame (Dressing Goal 1, OT) short term goal (STG);2 weeks  -SM     Progress/Outcome (Dressing Goal 1, OT) goal partially met  -SM       Row Name 02/27/24 1253          Toileting Goal 1 (OT)    Activity/Device (Toileting Goal 1, OT) toileting skills, all  -SM     [place] Level/Cues Needed (Toileting Goal 1, OT) modified independence  -SM     Time Frame (Toileting Goal 1, OT) short term goal (STG);2 weeks  -SM     Progress/Outcome (Toileting Goal 1, OT) goal partially met  -SM       Row Name 02/27/24 1253          Grooming Goal 1 (OT)    Activity/Device (Grooming Goal 1, OT) grooming skills, all  -SM     [place] (Grooming Goal 1, OT) modified independence  -SM     Time Frame (Grooming Goal 1, OT) short term goal (STG);2 weeks  -SM     Progress/Outcome (Grooming Goal 1, OT) goal partially met  -SM               User Key  (r) = Recorded By, (t) = Taken By, (c) = Cosigned By      Initials Name Provider Type    SM Rama Holguin OT Occupational Therapist                   Clinical Impression       Row Name 02/27/24 1249          Pain Assessment    Pretreatment Pain Rating 0/10 - no pain  -SM     Posttreatment Pain Rating 0/10 - no pain  -SM       Row Name 02/27/24 1249          Plan of Care Review    Plan of Care Reviewed With patient  -SM     Progress improving  -SM     Outcome Evaluation Pt is found in bed and is slightly aggitated with OT arrival and his conversation can be confusing at times but overall he is requesting to dc home. OT discussed  importance of engagement in OT to assess current safety for dc home as he has been unsteady and a X2 person assist in past several sessions. Pt appears to have decreased insight into his deficits and overall reports he doesnt think hospitalization is necessary for his current condition despite pt having a SDH and being in ICU bed currently. However pt does show a great improvement in standing balance and is able to complete ADLs and functional mobility SV assistance today. He would benefit from SV at dc due to cogntive concerns. He plans to dc home with his brother. Pt declined further cogntive treatment with SLP this AM. OT to sign off per pt request and now that he has shown improvement and safe to dc home with SV.  -       Row Name 02/27/24 1249          Therapy Plan Review/Discharge Plan (OT)    Anticipated Discharge Disposition (OT) home with 24/7 care  -       Row Name 02/27/24 1249          Positioning and Restraints    Pre-Treatment Position in bed  -SM     Post Treatment Position bed  -SM     In Bed sitting EOB  sitter, RN  -SM               User Key  (r) = Recorded By, (t) = Taken By, (c) = Cosigned By      Initials Name Provider Type    Rama [place], OT Occupational Therapist                   Outcome Measures       Row Name 02/27/24 1254          How much help from another is currently needed...    Putting on and taking off regular lower body clothing? 4  -SM     Bathing (including washing, rinsing, and drying) 3  -SM     Toileting (which includes using toilet bed pan or urinal) 3  -SM     Putting on and taking off regular upper body clothing 4  -SM     Taking care of personal grooming (such as brushing teeth) 4  -SM     Eating meals 4  -SM     AM-PAC 6 Clicks Score (OT) 22  -SM       Row Name 02/27/24 0953 02/27/24 0800       How much help from another person do you currently need...    Turning from your back to your side while in flat bed without using bedrails? 4  -MS (r) MH (t) MS (c) 4   -AB    Moving from lying on back to sitting on the side of a flat bed without bedrails? 4  -MS (r) MH (t) MS (c) 4  -AB    Moving to and from a bed to a chair (including a wheelchair)? 4  -MS (r) MH (t) MS (c) 4  -AB    Standing up from a chair using your arms (e.g., wheelchair, bedside chair)? 4  -MS (r) MH (t) MS (c) 4  -AB    Climbing 3-5 steps with a railing? 3  -MS (r) MH (t) MS (c) 4  -AB    To walk in hospital room? 4  -MS (r) MH (t) MS (c) 4  -AB    AM-PAC 6 Clicks Score (PT) 23  -MS (r) MH (t) 24  -AB    Highest Level of Mobility Goal 7 --> Walk 25 feet or more  -MS (r) MH (t) 8 --> Walked 250 feet or more  -AB      Row Name 02/27/24 1254 02/27/24 0953       Functional Assessment    Outcome Measure Options AM-PAC 6 Clicks Daily Activity (OT)  - AM-PAC 6 Clicks Basic Mobility (PT)  -MS (r) MH (t) MS (c)              User Key  (r) = Recorded By, (t) = Taken By, (c) = Cosigned By      Initials Name Provider Type    MS Barrientos Cecelia LAYTON, PT Physical Therapist     Rama Holguin, OT Occupational Therapist    AB Jeremías Newman, RN Registered Nurse    Orlando Hendricks, PT Student PT Student                    OT Recommendation and Plan     Plan of Care Review  Plan of Care Reviewed With: patient  Progress: improving  Outcome Evaluation: Pt is found in bed and is slightly aggitated with OT arrival and his conversation can be confusing at times but overall he is requesting to dc home. OT discussed importance of engagement in OT to assess current safety for dc home as he has been unsteady and a X2 person assist in past several sessions. Pt appears to have decreased insight into his deficits and overall reports he doesnt think hospitalization is necessary for his current condition despite pt having a SDH and being in ICU bed currently. However pt does show a great improvement in standing balance and is able to complete ADLs and functional mobility SV assistance today. He would benefit from SV at dc due  to cogntive concerns. He plans to dc home with his brother. Pt declined further cogntive treatment with SLP this AM. OT to sign off per pt request and now that he has shown improvement and safe to dc home with SV.  Plan of Care Reviewed With: patient  Outcome Evaluation: Pt is found in bed and is slightly aggitated with OT arrival and his conversation can be confusing at times but overall he is requesting to dc home. OT discussed importance of engagement in OT to assess current safety for dc home as he has been unsteady and a X2 person assist in past several sessions. Pt appears to have decreased insight into his deficits and overall reports he doesnt think hospitalization is necessary for his current condition despite pt having a SDH and being in ICU bed currently. However pt does show a great improvement in standing balance and is able to complete ADLs and functional mobility SV assistance today. He would benefit from SV at dc due to cogntive concerns. He plans to dc home with his brother. Pt declined further cogntive treatment with SLP this AM. OT to sign off per pt request and now that he has shown improvement and safe to dc home with SV.     Time Calculation:         Time Calculation- OT       Row Name 02/27/24 1255             Time Calculation- OT    OT Start Time 1004  -      OT Stop Time 1022  -      OT Time Calculation (min) 18 min  -      Total Timed Code Minutes- OT 18 minute(s)  -      OT Received On 02/27/24  -                User Key  (r) = Recorded By, (t) = Taken By, (c) = Cosigned By      Initials Name Provider Type     Rama Holguin OT Occupational Therapist                  Therapy Charges for Today       Code Description Service Date Service Provider Modifiers Qty    29953099550 HC OT THERAPEUTIC ACT EA 15 MIN 2/26/2024 Rama Holguin OT GO 1    94560552138 HC OT SELF CARE/MGMT/TRAIN EA 15 MIN 2/26/2024 Rama Holguin OT GO 1    74283637082 HC OT THERAPEUTIC ACT EA 15 MIN  2/27/2024 Rama Holguin, OT GO 1               OT Discharge Summary  Anticipated Discharge Disposition (OT): home with 24/7 care    Rama Holguin OT  2/27/2024

## 2024-02-27 NOTE — PROGRESS NOTES
LOS: 4 days   Patient Care Team:  Provider, No Known as PCP - General    Subjective      patient with subarachnoid and subdural hematomas A-fib and rapid rate and alcohol withdrawal.  Patient did require sitter for part of the night he Urinating on the floor he has been off Precedex now for about 24 hours he has no complaints.  Review of Systems:          Objective     Vital Signs  Vital Sign Min/Max for last 24 hours  Temp  Min: 97.3 °F (36.3 °C)  Max: 99.2 °F (37.3 °C)   BP  Min: 122/96  Max: 151/105   Pulse  Min: 65  Max: 100   Resp  Min: 23  Max: 23   SpO2  Min: 97 %  Max: 100 %   No data recorded   No data recorded        Ventilator/Non-Invasive Ventilation Settings (From admission, onward)      None                         Body mass index is 19.52 kg/m².  I/O last 3 completed shifts:  In: 4401.7 [P.O.:120; I.V.:4231.7; IV Piggyback:50]  Out: 2300 [Urine:2300]  I/O this shift:  In: 1916.6 [I.V.:1916.6]  Out: 200 [Urine:200]        Physical Exam:  General Appearance: Well-developed black male looks a decade or 2 at least older than his stated age resting in bed  Eyes: Pupils are about 3 mm equal and reactive to light the right eye is less swollen than yesterday he can open the eye fully now  ENT: He has a laceration over the right brow that is sutured I do not see any significant erythema or drainage associated with the wound.  I do not see any definite facial asymmetry, mucous membranes are little dry  Neck: Trachea midline no palpable adenopathy or thyromegaly no visible jugular venous distention  Lungs: Clear nonlabored symmetric expansion  Cardiac: Regular rate rhythm no murmur no gallop  Abdomen: Soft nontender no palpable hepatosplenomegaly or masses  : Not examined  Musculoskeletal: Grossly normal   Skin: Warm and dry no jaundice no petechiae  Neuro: He is oriented to person place year and month.  He is following commands with all 4 extremities  Extremities/P Vascular: No clubbing no cyanosis  he has palpable radial and dorsalis pedis pulses  MSE: He is answering questions better today still lacks a little paranoid.       Labs:  Results from last 7 days   Lab Units 02/27/24  0330 02/26/24  0445 02/25/24  1451 02/25/24  0355 02/24/24  0521 02/23/24  1708 02/23/24  0300 02/22/24  2217 02/22/24 2013   GLUCOSE mg/dL 91 179*  --  122* 104* 79 89 65 125*   SODIUM mmol/L 139 138  --  140 133* 136 138 135* 137   POTASSIUM mmol/L 3.8 4.1 3.8 3.6 3.7 3.7 3.2* 3.2* 3.2*   MAGNESIUM mg/dL  --   --   --   --   --   --   --  1.8 1.2*   CO2 mmol/L 22.0 17.5*  --  23.0 16.5* 20.5* 20.9* 19.8* 14.9*   CHLORIDE mmol/L 106 109*  --  105 100 103 96* 96* 90*   ANION GAP mmol/L 11.0 11.5  --  12.0 16.5* 12.5 21.1* 19.2* 32.1*   CREATININE mg/dL 0.88 0.67*  --  0.86 0.87 0.68* 0.74* 0.70* 0.88   BUN mg/dL 5* 4*  --  <2* 2* 3* 5* 6 6   BUN / CREAT RATIO  5.7* 6.0*  --   --  2.3* 4.4* 6.8* 8.6 6.8*   CALCIUM mg/dL 8.0* 6.9*  --  7.6* 7.5* 7.4* 8.3* 7.9* 8.5*   ALK PHOS U/L 220* 181*  --  194* 199*  --  244*  --  244*   TOTAL PROTEIN g/dL 5.9* 5.5*  --  6.2 5.9*  --  6.8  --  7.6   ALT (SGPT) U/L 59* 57*  --  65* 71*  --  100*  --  78*   AST (SGOT) U/L 138* 158*  --  156* 284*  --  567*  --  224*   BILIRUBIN mg/dL 0.6 0.6  --  0.9 1.1  --  1.2  --  0.7   ALBUMIN g/dL 3.5 3.3*  --  3.8 3.6 3.3* 4.1  --  4.7   GLOBULIN gm/dL 2.4 2.2  --  2.4 2.3  --  2.7  --  2.9     Estimated Creatinine Clearance: 115.4 mL/min (by C-G formula based on SCr of 0.88 mg/dL).      Results from last 7 days   Lab Units 02/27/24  0330 02/26/24  0445 02/25/24  0355 02/24/24  0521 02/23/24  0300 02/22/24 2013   WBC 10*3/mm3 6.14 5.31 4.83 5.32 5.13 4.98   RBC 10*6/mm3 3.24* 3.18* 3.40* 2.95* 3.43* 3.75*   HEMOGLOBIN g/dL 10.8* 10.5* 11.8* 10.0* 11.5* 12.7*   HEMATOCRIT % 32.3* 30.6* 33.3* 28.8* 33.4* 36.6*   MCV fL 99.7* 96.2 97.9* 97.6* 97.4* 97.6*   MCH pg 33.3* 33.0 34.7* 33.9* 33.5* 33.9*   MCHC g/dL 33.4 34.3 35.4 34.7 34.4 34.7   RDW % 15.1 14.4 15.2  15.4 15.8* 15.2   RDW-SD fl 55.1* 49.7 53.6 53.9 56.1* 53.9   MPV fL 11.1 11.6 11.9 11.4 10.5 10.6   PLATELETS 10*3/mm3 128* 96* 75* 68* 81* 96*   NEUTROPHIL % %  --   --   --   --   --  63.3   LYMPHOCYTE % %  --   --   --   --   --  23.3   MONOCYTES % %  --   --   --   --   --  11.4   EOSINOPHIL % %  --   --   --   --   --  1.0   BASOPHIL % %  --   --   --   --   --  0.4   NEUTROS ABS 10*3/mm3  --   --   --   --   --  3.15   LYMPHS ABS 10*3/mm3  --   --   --   --   --  1.16   MONOS ABS 10*3/mm3  --   --   --   --   --  0.57   EOS ABS 10*3/mm3  --   --   --   --   --  0.05   BASOS ABS 10*3/mm3  --   --   --   --   --  0.02         Results from last 7 days   Lab Units 02/23/24  0511 02/22/24  2217   CK TOTAL U/L 236* 163             Results from last 7 days   Lab Units 02/24/24  0521 02/23/24  0617 02/23/24  0511   LACTATE mmol/L  --  1.5  --    PROCALCITONIN ng/mL 0.42*  --  0.60*     Results from last 7 days   Lab Units 02/23/24  0300   INR  1.19*     Microbiology Results (last 10 days)       ** No results found for the last 240 hours. **                docusate sodium, 100 mg, Oral, Daily  folic acid 1 mg in sodium chloride 0.9 % 50 mL IVPB, 1 mg, Intravenous, Daily  levETIRAcetam, 500 mg, Intravenous, Q12H  metoprolol tartrate, 12.5 mg, Oral, Q12H  nicotine, 1 patch, Transdermal, Q24H  pantoprazole, 40 mg, Intravenous, Q AM  sodium chloride, 10 mL, Intravenous, Q12H  thiamine (B-1) IV, 200 mg, Intravenous, Q8H   Followed by  [START ON 2/28/2024] thiamine, 100 mg, Oral, Daily      dexmedetomidine, 0.2-1.5 mcg/kg/hr, Last Rate: Stopped (02/26/24 5898)  dextrose 5 % and sodium chloride 0.9 %, 100 mL/hr, Last Rate: 100 mL/hr (02/27/24 2623)  niCARdipine, 5-15 mg/hr        Diagnostics:  CT Head Without Contrast    Result Date: 2/24/2024  Patient: JOSÉ HORTON  Time Out: 06:30 Exam(s): CT HEAD Without Contrast EXAM:   CT Head Without Intravenous Contrast CLINICAL HISTORY:    Reason for exam: Follow-up traumatic  subdural hematoma. TECHNIQUE:   Axial computed tomography images of the head brain without intravenous contrast.  CTDI is 54.92 mGy and DLP is 1037.7 mGy-cm.  This CT exam was performed according to the principle of ALARA (As Low As Reasonably Achievable) by using one or more of the following dose reduction techniques: automated exposure control, adjustment of the mA and or kV according to patient size, and or use of iterative reconstruction technique. COMPARISON:   Noncontrast head CT 02 23 2024 0847 hrs. FINDINGS:   Brain:  Stable acute subdural hematoma over the left cerebral hemisphere measuring up to 5 mm is present with interval improvement in superimposed acute subarachnoid hemorrhage best appreciated in the left frontal sulci between the superior and middle frontal gyri.  No significant white matter disease.   Ventricles:  Unremarkable.  No ventriculomegaly.   Bones joints:  Unremarkable.  No acute fracture.   Soft tissues:  Unremarkable.   Sinuses:  Unremarkable as visualized.  No acute sinusitis.   Mastoid air cells:  Unremarkable as visualized.  No mastoid effusion. IMPRESSION:       Stable acute subdural hematoma over the left cerebral hemisphere measuring up to 5 mm is present with interval improvement in superimposed acute subarachnoid hemorrhage best appreciated in the left frontal sulci between the superior and middle frontal gyri.      Electronically signed by Steven Zhao MD on 02-24-24 at 0630    EEG Continuous Monitoring With Video    Result Date: 2/23/2024  Date of onset: 2/23/24 Date of offset: 2/23/24 Indication: seizure like activity, epilepsy vs alcohol withdrawal Technical description:  This is a 21 channel digital EEG recording that began on 1133 to 1159 with 2 minute pause to 1201 then ending 1409.  Background:  The background consists of a posteriorly dominant rhythm that is notably absent.  Anteriorly, there is as mixture of theta, alpha, beta, and delta activity.  There is fair  spontaneous variability.  Hyperventilation and photic stimulation were not performed.  There is no focal slowing.  There are no interictal epileptiform discharges and no electrographic seizures noted during the study.  EKG demonstrates normal rate.  Impression: This is an abnormal 2 to 12 hour EEG study due to the presence of diffuse theta slowing with alpha, beta and some delta slowing seen.  There are no interictal epileptiform discharges and no electrographic seizures.  These electrophysiologic findings are indicative of mild to moderate encephalopathy. In addition, beta activity is seen; this can be due to medication effect, often seen with medications such as benzodiazepines.     CT Head Without Contrast    Result Date: 2/23/2024  CT HEAD WITHOUT CONTRAST  HISTORY: Intracranial hemorrhage, follow-up.  COMPARISON: CT head 02/23/2024.  FINDINGS: A septum cavum pellucidum is noted. A small to moderate-sized supraorbital hematoma is appreciated on the right similar in appearance as compared to the prior examination. A subdural hematoma is appreciated overlying the left cerebral hemisphere anteriorly, laterally and superolaterally which has increased in attenuation as compared to 02/23/2024. It measures approximately 6 mm in thickness as measured perpendicular to the inner table on the coronal reconstructions. Subarachnoid blood is appreciated overlying the anterior and superior aspect of the left frontal lobe, unchanged. There was no evidence of midline shift, acute infarction or of intra-axial hemorrhage.      The pre-existing subdural hematoma overlying the left cerebral hemisphere is unchanged in size measuring approximately 6 mm in thickness. It has increased in attenuation however. Subarachnoid blood overlying the left frontal lobe is noted, present previously and unchanged. Continued surveillance is recommended.  Radiation dose reduction techniques were utilized, including automated exposure control and  exposure modulation based on body size.   This report was finalized on 2/23/2024 4:49 PM by Dr. Cj Cole M.D on Workstation: [id number]      Duplex Portal Hepatic Complete CAR    Result Date: 2/23/2024    All vessels appear normal with normal flow direction and no evidence of thrombus.     CT Abdomen Pelvis Without Contrast    Result Date: 2/23/2024  CT SCAN OF THE ABDOMEN AND PELVIS WITHOUT CONTRAST  HISTORY: Subarachnoid hemorrhage. Nausea and vomiting.  FINDINGS: The CT scan was performed through the abdomen and pelvis without contrast. The following findings are present: 1. The lung bases are clear. There is extensive low density throughout the liver consistent with severe hepatic steatosis. No focal liver lesions are seen. The spleen is not enlarged. The gallbladder shows no gallstones or wall thickening. 2. There is some vague increased density and suggestion of slight haziness in the region of the head of pancreas and suspicious for changes of mild acute pancreatitis and further clinical correlation is recommended. 3. The adrenal glands and both kidneys are unremarkable. There is no aortic aneurysm or retroperitoneal lymphadenopathy. The large and small bowel loops are normal in caliber and show no inflammatory change. In the pelvis, the urinary bladder has a smooth contour.   Radiation dose reduction techniques were utilized, including automated exposure control and exposure modulation based on body size.   This report was finalized on 2/23/2024 12:27 PM by Dr. Sg Price M.D on Workstation: [id number]      CT Head Without Contrast    Result Date: 2/23/2024  CT OF THE HEAD WITHOUT CONTRAST; CT OF THE CERVICAL SPINE  HISTORY: Seizure  COMPARISON: February 22, 2024  TECHNIQUE: Axial CT imaging was obtained through the brain. No IV contrast was administered. Axial CT imaging was obtained through the cervical spine. Coronal and sagittal reformatted images were obtained.  FINDINGS: CT OF THE HEAD: The  patient has a mixed density (although predominantly hyperdense) subdural hematoma overlying the left cerebral convexity. Maximum thickness is 1.1 cm. It was not visible on the earlier study. There is midline shift, measuring up to 6 mm. There is trace left parafalcine subdural hemorrhage, measuring up to 2 mm, likely also a trace amount overlying the left tentorium. The patient also appears to have a small amount of subarachnoid hemorrhage. No calvarial fracture is seen. Mucosal thickening is noted within the ethmoid sinuses. There is a right supraorbital scalp hematoma/laceration. Right globe is intact. There is no post septal extension.  CT OF THE CERVICAL SPINE: No acute fracture or subluxation of the cervical spine is seen. Cervical vertebral body alignment appears within normal limits. There is no prevertebral soft tissue swelling. Images through the lung apices demonstrate emphysematous changes, which is an advanced finding for the age of 29. Thyroid gland and trachea are within normal limits.       1. Left cerebral convexity subdural hematoma with a maximum thickness of 1.1 cm. Midline shift of 6 mm. There is also trace subarachnoid hemorrhage, as well as trace left parafalcine and left tentorial hemorrhage. 2. No acute fracture or subluxation of the cervical spine is seen.  FINDINGS were discussed with Dr. Mckeon at 12:52 a.m.    Radiation dose reduction techniques were utilized, including automated exposure control and exposure modulation based on body size.   This report was finalized on 2/23/2024 12:55 AM by Dr. Alexa Ferrer M.D on Workstation: [id number]      CT Cervical Spine Without Contrast    Result Date: 2/23/2024  CT OF THE HEAD WITHOUT CONTRAST; CT OF THE CERVICAL SPINE  HISTORY: Seizure  COMPARISON: February 22, 2024  TECHNIQUE: Axial CT imaging was obtained through the brain. No IV contrast was administered. Axial CT imaging was obtained through the cervical spine. Coronal and sagittal  reformatted images were obtained.  FINDINGS: CT OF THE HEAD: The patient has a mixed density (although predominantly hyperdense) subdural hematoma overlying the left cerebral convexity. Maximum thickness is 1.1 cm. It was not visible on the earlier study. There is midline shift, measuring up to 6 mm. There is trace left parafalcine subdural hemorrhage, measuring up to 2 mm, likely also a trace amount overlying the left tentorium. The patient also appears to have a small amount of subarachnoid hemorrhage. No calvarial fracture is seen. Mucosal thickening is noted within the ethmoid sinuses. There is a right supraorbital scalp hematoma/laceration. Right globe is intact. There is no post septal extension.  CT OF THE CERVICAL SPINE: No acute fracture or subluxation of the cervical spine is seen. Cervical vertebral body alignment appears within normal limits. There is no prevertebral soft tissue swelling. Images through the lung apices demonstrate emphysematous changes, which is an advanced finding for the age of 29. Thyroid gland and trachea are within normal limits.       1. Left cerebral convexity subdural hematoma with a maximum thickness of 1.1 cm. Midline shift of 6 mm. There is also trace subarachnoid hemorrhage, as well as trace left parafalcine and left tentorial hemorrhage. 2. No acute fracture or subluxation of the cervical spine is seen.  FINDINGS were discussed with Dr. Mckeon at 12:52 a.m.    Radiation dose reduction techniques were utilized, including automated exposure control and exposure modulation based on body size.   This report was finalized on 2/23/2024 12:55 AM by Dr. Aelxa Ferrer M.D on Workstation: [id number]      XR Chest 1 View    Result Date: 2/22/2024  XR CHEST 1 VW-  HISTORY: 29-year-old male with seizure.  FINDINGS: There is no evidence for pneumonia, effusions, CHF, or pneumothorax.  This report was finalized on 2/22/2024 8:45 PM by Dr. Kimmy Carmona M.D on Workstation: [id number]       CT Head Without Contrast    Result Date: 2/22/2024  CT HEAD WO CONTRAST-  INDICATIONS: Seizure  TECHNIQUE: Radiation dose reduction techniques were utilized, including automated exposure control and exposure modulation based on body size. Noncontrast head CT  COMPARISON: None available  FINDINGS:    No acute intracranial hemorrhage, midline shift or mass effect. No acute territorial infarct is identified.   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 Ventricles, cisterns, cerebral sulci are otherwise unremarkable for patient age.  The visualized paranasal sinuses, orbits, mastoid air cells are unremarkable.        No acute intracranial hemorrhage or hydrocephalus. Cyst of the septum pellucidum.  For further evaluation of the cause of patient's seizure, if indicated, enhanced MRI is advised.  This report was finalized on 2/22/2024 8:41 PM by Dr. Albert Moran M.D on Workstation: [id number]              Active Hospital Problems    Diagnosis  POA    **Subdural hematoma [S06.5XAA]  Yes      Resolved Hospital Problems   No resolved problems to display.         Assessment & Plan     Intracranial hemorrhage with both subdural and subarachnoid hemorrhage traumatic left cerebral hemisphere.  Neurosurgery.  Stable from their standpoint for discharge will see him in 2 weeks in our office with follow-up CT scan  Seizure most likely alcohol withdrawal although with the trauma and bleed discussed with neurology and they recommend continuing Keppra for several months and following up with them as an outpatient in a few months.  Neurology has reviewed seizure precautions with the patient and his family  Alcohol abuse and  alcohol withdrawal patient on CIWA protocol and thiamine.  He is improving  Hypertension blood pressures have been creeping up have increased his Coreg we may need further modifications  A-fib with rapid ventricular response reported I do  not see any EKGs with any definite A-fib documented he is sinus now we will continue to monitor  Alcoholic ketoacidosis improving  Acute alcoholic hepatitis as hepatitis panel negative and hepatic Doppler negative.  He is starting to improve.  Discriminant function does not suggest steroids warranted at this time.  Continue to follow  Tobacco abuse apparently patient is a smoker asking for cigarettes will put a NicoDerm patch on.  Anemia macrocytic probably deficient on replacement  Hyperglycemia better  Fluids/electrolytes/nutrition hyponatremia resolved  Thrombocytopenia suspect alcoholic marrow suppression improving.  Elevated procalcitonin it is falling normal white count ,afebrile no clear infection source we will continue to follow.  Infection.  Laceration over right eye with multiple sutures skin a large ecchymoses no signs of infection the edema has gone down significantly.  Weakness patient unsteady a little bit yesterday with physical therapy he is going to need to be stable to go home    It is improving he needs his blood pressure better controlled he needs to be stronger and little better cognitive function prior to discharge I do not think he needs the ICU he has not required any drips for his alcohol withdrawal now and 24 hours.  Ongoing asked the hospitalist to help with managing his hypertension strengthening and see if they would consider taking in transfer to their service.      Plan for disposition:    Caleb Tena Jr, MD  02/27/24  06:37 EST    Time:

## 2024-02-27 NOTE — PAYOR COMM NOTE
"Nida Peck (29 y.o. Male)                           ATTENTION; CONTINUED CLINICALS CASE REF #[id number] FOR 2/26                        REPLY TO [facility] DEPT  [phone] OR CALL   FREDERIC SIMON LPKAMERON           Date of Birth   1995    Social Security Number       Address   [address] [facility] [address]    Home Phone   [phone]    MRN   [id number]       Holiness   Episcopalian    Marital Status   Single                            Admission Date   2/22/24    Admission Type   Emergency    Admitting Provider   Agus Baltazar MD    Attending Provider   Calbe Tena Jr., MD    Department, Room/Bed   [facility] INTENSIVE CARE, I377/1       Discharge Date       Discharge Disposition       Discharge Destination                                 Attending Provider: Caleb Tena Jr., MD    Allergies: Penicillins, Sulfa Antibiotics    Isolation: None   Infection: None   Code Status: CPR    Ht: 185.4 cm (73\")   Wt: 67.1 kg (147 lb 14.9 oz)    Admission Cmt: None   Principal Problem: Subdural hematoma [S06.5XAA]                   Active Insurance as of 2/22/2024       Primary Coverage       Payor Plan Insurance Group Employer/Plan Group    [employer] [facility] BY [employer]        Payor Plan Address Payor Plan Phone Number Payor Plan Fax Number Effective Dates    [address] [address]   2/22/2024 - None Entered    [facility] [id number]         Subscriber Name Subscriber Birth Date Member ID       NIDA PECK 1995 [id number]                     Emergency Contacts        (Rel.) Home Phone Work Phone Mobile Phone    CHINMAY BRASHER (Friend) -- -- [phone]    Margoth Wagoner (Mother) -- -- [phone]              Vital Signs (last day)       Date/Time Temp Temp src Pulse Resp BP Patient Position SpO2    02/27/24 0828 -- -- 75 -- 124/93 -- --    02/27/24 0748 98.9 (37.2) Oral -- -- -- -- --    02/27/24 0600 -- -- 64 -- 125/91 -- 98    02/27/24 0500 -- " -- 69 -- 132/94 -- 100    02/27/24 0400 -- -- 78 -- -- -- 100    02/27/24 0300 -- -- 87 -- -- -- 100    02/27/24 0200 -- -- 75 -- 133/93 -- 99    02/27/24 0100 -- -- -- -- 144/96 -- --    02/27/24 0001 99.2 (37.3) Oral 70 23 151/105 Lying 100    02/27/24 0000 -- -- 68 -- -- -- --    02/26/24 2300 99 (37.2) -- 71 23 145/103 -- 100    02/26/24 2100 -- -- 71 -- 146/102 -- 100    02/26/24 2000 -- -- 73 -- 147/110 -- 100    02/26/24 1900 98.8 (37.1) Oral 75 23 137/99 Lying 100    02/26/24 1800 -- -- 78 -- 150/117 -- 100    02/26/24 1745 -- -- 76 -- -- -- 99    02/26/24 1730 -- -- 74 -- -- -- 100    02/26/24 1715 -- -- 71 -- -- -- 99    02/26/24 1700 98.9 (37.2) Oral 74 -- 140/102 -- 100    02/26/24 1645 -- -- 70 -- -- -- 99    02/26/24 1630 -- -- 72 -- -- -- 98    02/26/24 1600 -- -- 79 -- 141/111 -- 100    02/26/24 1530 -- -- 81 -- -- -- 99    02/26/24 1515 -- -- 84 -- -- -- 99    02/26/24 1514 -- -- 78 -- 148/109 -- --    02/26/24 1500 -- -- 80 -- 146/103 -- 99    02/26/24 1445 -- -- 81 -- -- -- 100    02/26/24 1430 -- -- 81 -- -- -- 100    02/26/24 1415 -- -- 78 -- -- -- 100    02/26/24 1400 -- -- 100 -- -- -- 97    02/26/24 1345 -- -- 86 -- -- -- 99    02/26/24 1330 -- -- 85 -- -- -- 100    02/26/24 1300 -- -- 82 -- 128/95 -- 100    02/26/24 1245 -- -- 81 -- -- -- 99    02/26/24 1230 -- -- 75 -- -- -- 99    02/26/24 1215 -- -- 87 -- -- -- 100    02/26/24 1200 -- -- 76 -- 129/92 -- 99    02/26/24 1145 97.3 (36.3) Oral 78 -- -- -- 99    02/26/24 1130 -- -- 78 -- -- -- 100    02/26/24 1115 -- -- 76 -- -- -- 99    02/26/24 1100 -- -- 69 -- 134/96 -- 100    02/26/24 1045 -- -- 73 -- -- -- 100    02/26/24 1030 -- -- 73 -- 137/102 -- 100    02/26/24 1015 -- -- 74 -- -- -- 100    02/26/24 1000 -- -- 74 -- 134/111 -- 100    02/26/24 0945 -- -- 75 -- -- -- 100    02/26/24 0934 -- -- 72 -- 124/93 -- --    02/26/24 0930 -- -- 73 -- -- -- 100    02/26/24 0915 -- -- 72 -- -- -- --    02/26/24 0900 -- -- 73 -- 138/106 -- 100     02/26/24 0845 -- -- 73 -- -- -- 100    02/26/24 0830 -- -- 73 -- -- -- 100    02/26/24 0815 -- -- 72 -- -- -- 98    02/26/24 0809 -- -- 72 -- 122/96 -- --    02/26/24 0800 -- -- 70 -- 131/102 -- 97    02/26/24 0745 97.4 (36.3) Oral 68 -- -- -- 99    02/26/24 0730 -- -- 65 -- -- -- 98    02/26/24 0715 -- -- 66 -- -- -- 98    02/26/24 0700 -- -- 66 -- 122/96 -- 98    02/26/24 0600 -- -- 70 -- 126/96 -- 98    02/26/24 0500 -- -- 74 -- 141/107 -- 99    02/26/24 0400 -- -- 76 -- 101/71 -- 100    02/26/24 0359 97.5 (36.4) Oral -- -- -- -- --    02/26/24 0300 -- -- 74 -- 135/105 -- 99    02/26/24 0200 -- -- 75 -- 133/103 -- 91    02/26/24 0100 -- -- 75 -- 106/83 -- 95    02/26/24 0000 -- -- 76 -- 123/82 -- 99          Oxygen Therapy (last day)       Date/Time SpO2 Device (Oxygen Therapy) Flow (L/min) Oxygen Concentration (%) ETCO2 (mmHg)    02/27/24 0600 98 -- -- -- --    02/27/24 0500 100 -- -- -- --    02/27/24 0400 100 -- -- -- --    02/27/24 0300 100 -- -- -- --    02/27/24 0200 99 -- -- -- --    02/27/24 0001 100 room air -- -- --    02/26/24 2300 100 -- -- -- --    02/26/24 2100 100 -- -- -- --    02/26/24 2000 100 -- -- -- --    02/26/24 1900 100 room air -- -- --    02/26/24 1800 100 -- -- -- --    02/26/24 1745 99 -- -- -- --    02/26/24 1730 100 -- -- -- --    02/26/24 1715 99 -- -- -- --    02/26/24 1700 100 -- -- -- --    02/26/24 1645 99 -- -- -- --    02/26/24 1630 98 -- -- -- --    02/26/24 1600 100 room air -- -- --    02/26/24 1530 99 -- -- -- --    02/26/24 1515 99 -- -- -- --    02/26/24 1500 99 -- -- -- --    02/26/24 1445 100 -- -- -- --    02/26/24 1430 100 -- -- -- --    02/26/24 1415 100 -- -- -- --    02/26/24 1400 97 -- -- -- --    02/26/24 1345 99 -- -- -- --    02/26/24 1330 100 -- -- -- --    02/26/24 1300 100 -- -- -- --    02/26/24 1245 99 -- -- -- --    02/26/24 1230 99 -- -- -- --    02/26/24 1215 100 -- -- -- --    02/26/24 1200 99 room air -- -- --    02/26/24 1145 99 -- -- -- --     02/26/24 1130 100 -- -- -- --    02/26/24 1115 99 -- -- -- --    02/26/24 1100 100 -- -- -- --    02/26/24 1045 100 -- -- -- --    02/26/24 1030 100 -- -- -- --    02/26/24 1015 100 -- -- -- --    02/26/24 1000 100 -- -- -- --    02/26/24 0945 100 -- -- -- --    02/26/24 0930 100 -- -- -- --    02/26/24 0900 100 -- -- -- --    02/26/24 0845 100 -- -- -- --    02/26/24 0830 100 -- -- -- --    02/26/24 0815 98 -- -- -- --    02/26/24 0800 97 room air -- -- --    02/26/24 0745 99 -- -- -- --    02/26/24 0730 98 -- -- -- --    02/26/24 0715 98 -- -- -- --    02/26/24 0700 98 -- -- -- --    02/26/24 0600 98 -- -- -- --    02/26/24 0500 99 -- -- -- --    02/26/24 0400 100 -- -- -- --    02/26/24 0300 99 -- -- -- --    02/26/24 0200 91 -- -- -- --    02/26/24 0100 95 -- -- -- --    02/26/24 0000 99 -- -- -- --          Lines, Drains & Airways       Active LDAs       Name Placement date Placement time Site Days    Peripheral IV 02/26/24 2308 Left;Posterior Forearm 02/26/24 2308  Forearm  less than 1                  Orders (last 24 hrs)        Start     Ordered    02/28/24 0900  thiamine (VITAMIN B-1) tablet 100 mg  Daily        See Jameel for full Linked Orders Report.    02/23/24 0133    02/27/24 2100  metoprolol tartrate (LOPRESSOR) tablet 25 mg  Every 12 Hours Scheduled         02/27/24 0838    02/27/24 0844  Inpatient Hospitalist Consult  Once        Specialty:  Hospitalist  Provider:  Libra Jasso MD    02/27/24 0843    02/27/24 0600  Calcium  Morning Draw,   Status:  Canceled         02/26/24 0753    02/27/24 0600  Phosphorus  PROCEDURE ONCE         02/26/24 2201 02/26/24 2225  loperamide (IMODIUM) capsule 2 mg  3 Times Daily PRN         02/26/24 2225    02/26/24 1743  POC Glucose Once  PROCEDURE ONCE        Comments: Complete no more than 45 minutes prior to patient eating      02/26/24 1740    02/26/24 1139  POC Glucose Once  PROCEDURE ONCE        Comments: Complete no more than 45 minutes prior to  patient eating      02/26/24 1130    02/26/24 0945  calcium gluconate 2000-675 MG/100ML NACL IVPB  Every 2 Hours        See Hyperspace for full Linked Orders Report.    02/26/24 0753    02/26/24 0845  calcium gluconate 1000 Mg/50ml 0.675% NaCl IV SOLN  Every 1 Hour        See Hyperspace for full Linked Orders Report.    02/26/24 0753    02/25/24 1045  nicotine (NICODERM CQ) 21 MG/24HR patch 1 patch  Every 24 Hours Scheduled         02/25/24 0949    02/25/24 0045  dexmedetomidine (PRECEDEX) 400 mcg in 100 mL NS infusion  Titrated         02/24/24 2346    02/23/24 2100  metoprolol tartrate (LOPRESSOR) tablet 12.5 mg  Every 12 Hours Scheduled,   Status:  Discontinued         02/23/24 1641    02/23/24 1815  dextrose 5 % and sodium chloride 0.9 % infusion  Continuous         02/23/24 1715    02/23/24 1643  Renal Function Panel  Daily       02/23/24 1642    02/23/24 1015  levETIRAcetam (KEPPRA) injection 500 mg  Every 12 Hours Scheduled         02/23/24 0925    02/23/24 0900  docusate sodium (COLACE) capsule 100 mg  Daily         02/23/24 0122    02/23/24 0900  folic acid 1 mg in sodium chloride 0.9 % 50 mL IVPB  Daily         02/23/24 0133    02/23/24 0731  influenza vac split quad (FLUZONE,FLUARIX,AFLURIA,FLULAVAL) injection 0.5 mL  During Hospitalization         02/23/24 0731    02/23/24 0600  POC Glucose Q6H  Every 6 Hours,   Status:  Canceled      Comments: May Discontinue After 2 Consecutive Readings Less Than 140Notify Provider if 2 Readings Greater Than 140      02/23/24 0122    02/23/24 0600  CBC (No Diff)  Daily       02/23/24 0122    02/23/24 0600  Comprehensive Metabolic Panel  Daily       02/23/24 0122    02/23/24 0600  thiamine (B-1) injection 200 mg  Every 8 Hours Scheduled        See Hyperspace for full Linked Orders Report.    02/23/24 0133    02/23/24 0600  POC Glucose Finger Q6H  Every 6 Hours,   Status:  Canceled      Comments: Complete no more than 45 minutes prior to patient eating      02/23/24 0136     02/23/24 0600  pantoprazole (PROTONIX) injection 40 mg  Every Early Morning         02/23/24 0459    02/23/24 0400  Intake and Output  Every 4 Hours       02/23/24 0122    02/23/24 0138  sodium chloride 0.9 % flush 10 mL  Every 12 Hours Scheduled         02/23/24 0122    02/23/24 0138  niCARdipine (CARDENE) 25 mg in 250 mL NS infusion kit  Titrated         02/23/24 0122    02/23/24 0132  LORazepam (ATIVAN) tablet 1 mg  Every 1 Hour PRN        See Hyperspace for full Linked Orders Report.    02/23/24 0133    02/23/24 0132  LORazepam (ATIVAN) injection 1 mg  Every 1 Hour PRN        See Hyperspace for full Linked Orders Report.    02/23/24 0133    02/23/24 0132  LORazepam (ATIVAN) tablet 2 mg  Every 1 Hour PRN        See Hyperspace for full Linked Orders Report.    02/23/24 0133    02/23/24 0132  LORazepam (ATIVAN) injection 2 mg  Every 1 Hour PRN        See Hyperspace for full Linked Orders Report.    02/23/24 0133    02/23/24 0132  LORazepam (ATIVAN) injection 2 mg  Every 15 Minutes PRN        See Hyperspace for full Linked Orders Report.    02/23/24 0133    02/23/24 0132  LORazepam (ATIVAN) injection 2 mg  Every 15 Minutes PRN        See Hyperspace for full Linked Orders Report.    02/23/24 0133    02/23/24 0132  LORazepam (ATIVAN) tablet 4 mg  Every 1 Hour PRN        See Hyperspace for full Linked Orders Report.    02/23/24 0133    02/23/24 0132  LORazepam (ATIVAN) injection 4 mg  Every 1 Hour PRN        See Hyperspace for full Linked Orders Report.    02/23/24 0133    02/23/24 0129  cloNIDine (CATAPRES) tablet 0.1 mg  Every 4 Hours PRN         02/23/24 0133    02/23/24 0123  Potassium Replacement - Follow Nurse / BPA Driven Protocol  As Needed         02/23/24 0124    02/23/24 0123  Magnesium Standard Dose Replacement - Follow Nurse / BPA Driven Protocol  As Needed         02/23/24 0124    02/23/24 0123  Phosphorus Replacement - Follow Nurse / BPA Driven Protocol  As Needed         02/23/24 0124    02/23/24  0123  Calcium Replacement - Follow Nurse / BPA Driven Protocol  As Needed         02/23/24 0124    02/23/24 0121  NIHSS Assessment  Every Shift      Comments: Turn off all sedation medications prior to performing assessment. Assessment to be performed upon admission, transfer to another unit, discharge, and with neurological decline. If NIHSS change is greater than or equal to 4 and/or neurological decline is noted notify physician.    02/23/24 0122    02/23/24 0120  sodium chloride 0.9 % flush 10 mL  As Needed         02/23/24 0122    02/23/24 0120  sodium chloride 0.9 % infusion 40 mL  As Needed         02/23/24 0122    02/23/24 0120  acetaminophen (TYLENOL) tablet 650 mg  Every 4 Hours PRN        See Hyperspace for full Linked Orders Report.    02/23/24 0122 02/23/24 0120  acetaminophen (TYLENOL) suppository 650 mg  Every 4 Hours PRN        See Hyperspace for full Linked Orders Report.    02/23/24 0122    02/23/24 0120  ondansetron (ZOFRAN) injection 4 mg  Every 6 Hours PRN         02/23/24 0122    02/23/24 0120  bisacodyl (DULCOLAX) suppository 10 mg  Daily PRN         02/23/24 0122    02/23/24 0120  aluminum-magnesium hydroxide-simethicone (MAALOX MAX) 400-400-40 MG/5ML suspension 7.5 mL  Every 4 Hours PRN         02/23/24 0122    Unscheduled  Order CT Head Without Contrast for Neurological Decline  As Needed       02/23/24 0122                     Physician Progress Notes (last 48 hours)        Caleb Tena Jr., MD at 02/27/24 0637                   LOS: 4 days   Patient Care Team:  Provider, No Known as PCP - General    Subjective      patient with subarachnoid and subdural hematomas A-fib and rapid rate and alcohol withdrawal.  Patient did require sitter for part of the night he Urinating on the floor he has been off Precedex now for about 24 hours he has no complaints.  Review of Systems:          Objective     Vital Signs  Vital Sign Min/Max for last 24 hours  Temp  Min: 97.3 °F (36.3 °C)  Max:  99.2 °F (37.3 °C)   BP  Min: 122/96  Max: 151/105   Pulse  Min: 65  Max: 100   Resp  Min: 23  Max: 23   SpO2  Min: 97 %  Max: 100 %   No data recorded   No data recorded        Ventilator/Non-Invasive Ventilation Settings (From admission, onward)      None                         Body mass index is 19.52 kg/m².  I/O last 3 completed shifts:  In: 4401.7 [P.O.:120; I.V.:4231.7; IV Piggyback:50]  Out: 2300 [Urine:2300]  I/O this shift:  In: 1916.6 [I.V.:1916.6]  Out: 200 [Urine:200]        Physical Exam:  General Appearance: Well-developed black male looks a decade or 2 at least older than his stated age resting in bed  Eyes: Pupils are about 3 mm equal and reactive to light the right eye is less swollen than yesterday he can open the eye fully now  ENT: He has a laceration over the right brow that is sutured I do not see any significant erythema or drainage associated with the wound.  I do not see any definite facial asymmetry, mucous membranes are little dry  Neck: Trachea midline no palpable adenopathy or thyromegaly no visible jugular venous distention  Lungs: Clear nonlabored symmetric expansion  Cardiac: Regular rate rhythm no murmur no gallop  Abdomen: Soft nontender no palpable hepatosplenomegaly or masses  : Not examined  Musculoskeletal: Grossly normal   Skin: Warm and dry no jaundice no petechiae  Neuro: He is oriented to person place year and month.  He is following commands with all 4 extremities  Extremities/P Vascular: No clubbing no cyanosis he has palpable radial and dorsalis pedis pulses  MSE: He is answering questions better today still lacks a little paranoid.       Labs:  Results from last 7 days   Lab Units 02/27/24  0330 02/26/24  0445 02/25/24  1451 02/25/24  0355 02/24/24  0521 02/23/24  1708 02/23/24  0300 02/22/24  2217 02/22/24 2013   GLUCOSE mg/dL 91 179*  --  122* 104* 79 89 65 125*   SODIUM mmol/L 139 138  --  140 133* 136 138 135* 137   POTASSIUM mmol/L 3.8 4.1 3.8 3.6 3.7 3.7 3.2*  3.2* 3.2*   MAGNESIUM mg/dL  --   --   --   --   --   --   --  1.8 1.2*   CO2 mmol/L 22.0 17.5*  --  23.0 16.5* 20.5* 20.9* 19.8* 14.9*   CHLORIDE mmol/L 106 109*  --  105 100 103 96* 96* 90*   ANION GAP mmol/L 11.0 11.5  --  12.0 16.5* 12.5 21.1* 19.2* 32.1*   CREATININE mg/dL 0.88 0.67*  --  0.86 0.87 0.68* 0.74* 0.70* 0.88   BUN mg/dL 5* 4*  --  <2* 2* 3* 5* 6 6   BUN / CREAT RATIO  5.7* 6.0*  --   --  2.3* 4.4* 6.8* 8.6 6.8*   CALCIUM mg/dL 8.0* 6.9*  --  7.6* 7.5* 7.4* 8.3* 7.9* 8.5*   ALK PHOS U/L 220* 181*  --  194* 199*  --  244*  --  244*   TOTAL PROTEIN g/dL 5.9* 5.5*  --  6.2 5.9*  --  6.8  --  7.6   ALT (SGPT) U/L 59* 57*  --  65* 71*  --  100*  --  78*   AST (SGOT) U/L 138* 158*  --  156* 284*  --  567*  --  224*   BILIRUBIN mg/dL 0.6 0.6  --  0.9 1.1  --  1.2  --  0.7   ALBUMIN g/dL 3.5 3.3*  --  3.8 3.6 3.3* 4.1  --  4.7   GLOBULIN gm/dL 2.4 2.2  --  2.4 2.3  --  2.7  --  2.9     Estimated Creatinine Clearance: 115.4 mL/min (by C-G formula based on SCr of 0.88 mg/dL).      Results from last 7 days   Lab Units 02/27/24  0330 02/26/24  0445 02/25/24  0355 02/24/24  0521 02/23/24  0300 02/22/24 2013   WBC 10*3/mm3 6.14 5.31 4.83 5.32 5.13 4.98   RBC 10*6/mm3 3.24* 3.18* 3.40* 2.95* 3.43* 3.75*   HEMOGLOBIN g/dL 10.8* 10.5* 11.8* 10.0* 11.5* 12.7*   HEMATOCRIT % 32.3* 30.6* 33.3* 28.8* 33.4* 36.6*   MCV fL 99.7* 96.2 97.9* 97.6* 97.4* 97.6*   MCH pg 33.3* 33.0 34.7* 33.9* 33.5* 33.9*   MCHC g/dL 33.4 34.3 35.4 34.7 34.4 34.7   RDW % 15.1 14.4 15.2 15.4 15.8* 15.2   RDW-SD fl 55.1* 49.7 53.6 53.9 56.1* 53.9   MPV fL 11.1 11.6 11.9 11.4 10.5 10.6   PLATELETS 10*3/mm3 128* 96* 75* 68* 81* 96*   NEUTROPHIL % %  --   --   --   --   --  63.3   LYMPHOCYTE % %  --   --   --   --   --  23.3   MONOCYTES % %  --   --   --   --   --  11.4   EOSINOPHIL % %  --   --   --   --   --  1.0   BASOPHIL % %  --   --   --   --   --  0.4   NEUTROS ABS 10*3/mm3  --   --   --   --   --  3.15   LYMPHS ABS 10*3/mm3  --   --   --    --   --  1.16   MONOS ABS 10*3/mm3  --   --   --   --   --  0.57   EOS ABS 10*3/mm3  --   --   --   --   --  0.05   BASOS ABS 10*3/mm3  --   --   --   --   --  0.02         Results from last 7 days   Lab Units 02/23/24  0511 02/22/24  2217   CK TOTAL U/L 236* 163             Results from last 7 days   Lab Units 02/24/24  0521 02/23/24  0617 02/23/24  0511   LACTATE mmol/L  --  1.5  --    PROCALCITONIN ng/mL 0.42*  --  0.60*     Results from last 7 days   Lab Units 02/23/24  0300   INR  1.19*     Microbiology Results (last 10 days)       ** No results found for the last 240 hours. **                docusate sodium, 100 mg, Oral, Daily  folic acid 1 mg in sodium chloride 0.9 % 50 mL IVPB, 1 mg, Intravenous, Daily  levETIRAcetam, 500 mg, Intravenous, Q12H  metoprolol tartrate, 12.5 mg, Oral, Q12H  nicotine, 1 patch, Transdermal, Q24H  pantoprazole, 40 mg, Intravenous, Q AM  sodium chloride, 10 mL, Intravenous, Q12H  thiamine (B-1) IV, 200 mg, Intravenous, Q8H   Followed by  [START ON 2/28/2024] thiamine, 100 mg, Oral, Daily      dexmedetomidine, 0.2-1.5 mcg/kg/hr, Last Rate: Stopped (02/26/24 0952)  dextrose 5 % and sodium chloride 0.9 %, 100 mL/hr, Last Rate: 100 mL/hr (02/27/24 0506)  niCARdipine, 5-15 mg/hr        Diagnostics:  CT Head Without Contrast    Result Date: 2/24/2024  Patient: JOSÉ HORTON  Time Out: 06:30 Exam(s): CT HEAD Without Contrast EXAM:   CT Head Without Intravenous Contrast CLINICAL HISTORY:    Reason for exam: Follow-up traumatic subdural hematoma. TECHNIQUE:   Axial computed tomography images of the head brain without intravenous contrast.  CTDI is 54.92 mGy and DLP is 1037.7 mGy-cm.  This CT exam was performed according to the principle of ALARA (As Low As Reasonably Achievable) by using one or more of the following dose reduction techniques: automated exposure control, adjustment of the mA and or kV according to patient size, and or use of iterative reconstruction technique. COMPARISON:    Noncontrast head CT 02 23 2024 0847 hrs. FINDINGS:   Brain:  Stable acute subdural hematoma over the left cerebral hemisphere measuring up to 5 mm is present with interval improvement in superimposed acute subarachnoid hemorrhage best appreciated in the left frontal sulci between the superior and middle frontal gyri.  No significant white matter disease.   Ventricles:  Unremarkable.  No ventriculomegaly.   Bones joints:  Unremarkable.  No acute fracture.   Soft tissues:  Unremarkable.   Sinuses:  Unremarkable as visualized.  No acute sinusitis.   Mastoid air cells:  Unremarkable as visualized.  No mastoid effusion. IMPRESSION:       Stable acute subdural hematoma over the left cerebral hemisphere measuring up to 5 mm is present with interval improvement in superimposed acute subarachnoid hemorrhage best appreciated in the left frontal sulci between the superior and middle frontal gyri.      Electronically signed by Steven Zhao MD on 02-24-24 at 0630    EEG Continuous Monitoring With Video    Result Date: 2/23/2024  Date of onset: 2/23/24 Date of offset: 2/23/24 Indication: seizure like activity, epilepsy vs alcohol withdrawal Technical description:  This is a 21 channel digital EEG recording that began on 1133 to 1159 with 2 minute pause to 1201 then ending 1409.  Background:  The background consists of a posteriorly dominant rhythm that is notably absent.  Anteriorly, there is as mixture of theta, alpha, beta, and delta activity.  There is fair spontaneous variability.  Hyperventilation and photic stimulation were not performed.  There is no focal slowing.  There are no interictal epileptiform discharges and no electrographic seizures noted during the study.  EKG demonstrates normal rate.  Impression: This is an abnormal 2 to 12 hour EEG study due to the presence of diffuse theta slowing with alpha, beta and some delta slowing seen.  There are no interictal epileptiform discharges and no electrographic  seizures.  These electrophysiologic findings are indicative of mild to moderate encephalopathy. In addition, beta activity is seen; this can be due to medication effect, often seen with medications such as benzodiazepines.     CT Head Without Contrast    Result Date: 2/23/2024  CT HEAD WITHOUT CONTRAST  HISTORY: Intracranial hemorrhage, follow-up.  COMPARISON: CT head 02/23/2024.  FINDINGS: A septum cavum pellucidum is noted. A small to moderate-sized supraorbital hematoma is appreciated on the right similar in appearance as compared to the prior examination. A subdural hematoma is appreciated overlying the left cerebral hemisphere anteriorly, laterally and superolaterally which has increased in attenuation as compared to 02/23/2024. It measures approximately 6 mm in thickness as measured perpendicular to the inner table on the coronal reconstructions. Subarachnoid blood is appreciated overlying the anterior and superior aspect of the left frontal lobe, unchanged. There was no evidence of midline shift, acute infarction or of intra-axial hemorrhage.      The pre-existing subdural hematoma overlying the left cerebral hemisphere is unchanged in size measuring approximately 6 mm in thickness. It has increased in attenuation however. Subarachnoid blood overlying the left frontal lobe is noted, present previously and unchanged. Continued surveillance is recommended.  Radiation dose reduction techniques were utilized, including automated exposure control and exposure modulation based on body size.   This report was finalized on 2/23/2024 4:49 PM by Dr. Cj Cole M.D on Workstation: [id number]      Duplex Portal Hepatic Complete CAR    Result Date: 2/23/2024    All vessels appear normal with normal flow direction and no evidence of thrombus.     CT Abdomen Pelvis Without Contrast    Result Date: 2/23/2024  CT SCAN OF THE ABDOMEN AND PELVIS WITHOUT CONTRAST  HISTORY: Subarachnoid hemorrhage. Nausea and vomiting.   FINDINGS: The CT scan was performed through the abdomen and pelvis without contrast. The following findings are present: 1. The lung bases are clear. There is extensive low density throughout the liver consistent with severe hepatic steatosis. No focal liver lesions are seen. The spleen is not enlarged. The gallbladder shows no gallstones or wall thickening. 2. There is some vague increased density and suggestion of slight haziness in the region of the head of pancreas and suspicious for changes of mild acute pancreatitis and further clinical correlation is recommended. 3. The adrenal glands and both kidneys are unremarkable. There is no aortic aneurysm or retroperitoneal lymphadenopathy. The large and small bowel loops are normal in caliber and show no inflammatory change. In the pelvis, the urinary bladder has a smooth contour.   Radiation dose reduction techniques were utilized, including automated exposure control and exposure modulation based on body size.   This report was finalized on 2/23/2024 12:27 PM by Dr. Sg Price M.D on Workstation: [id number]      CT Head Without Contrast    Result Date: 2/23/2024  CT OF THE HEAD WITHOUT CONTRAST; CT OF THE CERVICAL SPINE  HISTORY: Seizure  COMPARISON: February 22, 2024  TECHNIQUE: Axial CT imaging was obtained through the brain. No IV contrast was administered. Axial CT imaging was obtained through the cervical spine. Coronal and sagittal reformatted images were obtained.  FINDINGS: CT OF THE HEAD: The patient has a mixed density (although predominantly hyperdense) subdural hematoma overlying the left cerebral convexity. Maximum thickness is 1.1 cm. It was not visible on the earlier study. There is midline shift, measuring up to 6 mm. There is trace left parafalcine subdural hemorrhage, measuring up to 2 mm, likely also a trace amount overlying the left tentorium. The patient also appears to have a small amount of subarachnoid hemorrhage. No calvarial fracture is  seen. Mucosal thickening is noted within the ethmoid sinuses. There is a right supraorbital scalp hematoma/laceration. Right globe is intact. There is no post septal extension.  CT OF THE CERVICAL SPINE: No acute fracture or subluxation of the cervical spine is seen. Cervical vertebral body alignment appears within normal limits. There is no prevertebral soft tissue swelling. Images through the lung apices demonstrate emphysematous changes, which is an advanced finding for the age of 29. Thyroid gland and trachea are within normal limits.       1. Left cerebral convexity subdural hematoma with a maximum thickness of 1.1 cm. Midline shift of 6 mm. There is also trace subarachnoid hemorrhage, as well as trace left parafalcine and left tentorial hemorrhage. 2. No acute fracture or subluxation of the cervical spine is seen.  FINDINGS were discussed with Dr. Mckeon at 12:52 a.m.    Radiation dose reduction techniques were utilized, including automated exposure control and exposure modulation based on body size.   This report was finalized on 2/23/2024 12:55 AM by Dr. Alexa Ferrer M.D on Workstation: [id number]      CT Cervical Spine Without Contrast    Result Date: 2/23/2024  CT OF THE HEAD WITHOUT CONTRAST; CT OF THE CERVICAL SPINE  HISTORY: Seizure  COMPARISON: February 22, 2024  TECHNIQUE: Axial CT imaging was obtained through the brain. No IV contrast was administered. Axial CT imaging was obtained through the cervical spine. Coronal and sagittal reformatted images were obtained.  FINDINGS: CT OF THE HEAD: The patient has a mixed density (although predominantly hyperdense) subdural hematoma overlying the left cerebral convexity. Maximum thickness is 1.1 cm. It was not visible on the earlier study. There is midline shift, measuring up to 6 mm. There is trace left parafalcine subdural hemorrhage, measuring up to 2 mm, likely also a trace amount overlying the left tentorium. The patient also appears to have a  small amount of subarachnoid hemorrhage. No calvarial fracture is seen. Mucosal thickening is noted within the ethmoid sinuses. There is a right supraorbital scalp hematoma/laceration. Right globe is intact. There is no post septal extension.  CT OF THE CERVICAL SPINE: No acute fracture or subluxation of the cervical spine is seen. Cervical vertebral body alignment appears within normal limits. There is no prevertebral soft tissue swelling. Images through the lung apices demonstrate emphysematous changes, which is an advanced finding for the age of 29. Thyroid gland and trachea are within normal limits.       1. Left cerebral convexity subdural hematoma with a maximum thickness of 1.1 cm. Midline shift of 6 mm. There is also trace subarachnoid hemorrhage, as well as trace left parafalcine and left tentorial hemorrhage. 2. No acute fracture or subluxation of the cervical spine is seen.  FINDINGS were discussed with Dr. Mckeon at 12:52 a.m.    Radiation dose reduction techniques were utilized, including automated exposure control and exposure modulation based on body size.   This report was finalized on 2/23/2024 12:55 AM by Dr. Alexa Ferrer M.D on Workstation: [id number]      XR Chest 1 View    Result Date: 2/22/2024  XR CHEST 1 VW-  HISTORY: 29-year-old male with seizure.  FINDINGS: There is no evidence for pneumonia, effusions, CHF, or pneumothorax.  This report was finalized on 2/22/2024 8:45 PM by Dr. Kimmy Carmona M.D on Workstation: [id number]      CT Head Without Contrast    Result Date: 2/22/2024  CT HEAD WO CONTRAST-  INDICATIONS: Seizure  TECHNIQUE: Radiation dose reduction techniques were utilized, including automated exposure control and exposure modulation based on body size. Noncontrast head CT  COMPARISON: None available  FINDINGS:    No acute intracranial hemorrhage, midline shift or mass effect. No acute territorial infarct is identified.   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 Ventricles, cisterns, cerebral sulci are otherwise unremarkable for patient age.  The visualized paranasal sinuses, orbits, mastoid air cells are unremarkable.        No acute intracranial hemorrhage or hydrocephalus. Cyst of the septum pellucidum.  For further evaluation of the cause of patient's seizure, if indicated, enhanced MRI is advised.  This report was finalized on 2/22/2024 8:41 PM by Dr. Albert Moran M.D on Workstation: [id number]              Active Hospital Problems    Diagnosis  POA    **Subdural hematoma [S06.5XAA]  Yes      Resolved Hospital Problems   No resolved problems to display.         Assessment & Plan     Intracranial hemorrhage with both subdural and subarachnoid hemorrhage traumatic left cerebral hemisphere.  Neurosurgery.  Stable from their standpoint for discharge will see him in 2 weeks in our office with follow-up CT scan  Seizure most likely alcohol withdrawal although with the trauma and bleed discussed with neurology and they recommend continuing Keppra for several months and following up with them as an outpatient in a few months.  Neurology has reviewed seizure precautions with the patient and his family  Alcohol abuse and  alcohol withdrawal patient on CIWA protocol and thiamine.  He is improving  Hypertension blood pressures have been creeping up have increased his Coreg we may need further modifications  A-fib with rapid ventricular response reported I do not see any EKGs with any definite A-fib documented he is sinus now we will continue to monitor  Alcoholic ketoacidosis improving  Acute alcoholic hepatitis as hepatitis panel negative and hepatic Doppler negative.  He is starting to improve.  Discriminant function does not suggest steroids warranted at this time.  Continue to follow  Tobacco abuse apparently patient is a smoker asking for cigarettes will put a NicoDerm patch on.  Anemia macrocytic probably  deficient on replacement  Hyperglycemia better  Fluids/electrolytes/nutrition hyponatremia resolved  Thrombocytopenia suspect alcoholic marrow suppression improving.  Elevated procalcitonin it is falling normal white count ,afebrile no clear infection source we will continue to follow.  Infection.  Laceration over right eye with multiple sutures skin a large ecchymoses no signs of infection the edema has gone down significantly.  Weakness patient unsteady a little bit yesterday with physical therapy he is going to need to be stable to go home    It is improving he needs his blood pressure better controlled he needs to be stronger and little better cognitive function prior to discharge I do not think he needs the ICU he has not required any drips for his alcohol withdrawal now and 24 hours.  Ongoing asked the hospitalist to help with managing his hypertension strengthening and see if they would consider taking in transfer to their service.      Plan for disposition:    Caleb Tena Jr, MD  02/27/24  06:37 EST    Time:       Electronically signed by Caleb Tena Jr., MD at 02/27/24 0843       Caleb Tena Jr., MD at 02/26/24 0701                   LOS: 3 days   Patient Care Team:  Provider, No Known as PCP - General    Subjective      patient with subarachnoid and subdural hematomas A-fib and rapid rate and alcohol withdrawal.  Patient just got up to the chair with physical therapy he did reasonably well he is still on Precedex at point 6 mics per kilogram per hour nursing reports he was pretty good last night he is still really elusive with his answers    Review of Systems:          Objective     Vital Signs  Vital Sign Min/Max for last 24 hours  Temp  Min: 97.5 °F (36.4 °C)  Max: 97.9 °F (36.6 °C)   BP  Min: 94/83  Max: 141/107   Pulse  Min: 66  Max: 79   No data recorded   SpO2  Min: 80 %  Max: 100 %   No data recorded   No data recorded        Ventilator/Non-Invasive Ventilation Settings (From  admission, onward)      None                         Body mass index is 19.52 kg/m².  I/O last 3 completed shifts:  In: 4298.6 [I.V.:4048.6; IV Piggyback:250]  Out: 2200 [Urine:2200]  No intake/output data recorded.        Physical Exam:  General Appearance: Well-developed black male looks a decade or 2 at least older than his stated age he is sitting up in a chair  Eyes: Pupils are about 3 mm equal and reactive to light the right eye is less swollen than yesterday he can open the eye fully now  ENT: He has a laceration over the right brow that is sutured I do not see any significant erythema or drainage associated with the wound.  I do not see any definite facial asymmetry, mucous membranes are little dry  Neck: Trachea midline no palpable adenopathy or thyromegaly no visible jugular venous distention  Lungs: Clear nonlabored symmetric expansion  Cardiac: Regular rate rhythm no murmur no gallop  Abdomen: Soft nontender no palpable hepatosplenomegaly or masses  : Not examined  Musculoskeletal: Grossly normal   Skin: Warm and dry no jaundice no petechiae  Neuro: Chair he is moving his extremities pretty well  Extremities/P Vascular: No clubbing no cyanosis he has palpable radial and dorsalis pedis pulses  MSE: He is certainly a little more conversant today but sort of very elusive almost paranoid in his responses       Labs:  Results from last 7 days   Lab Units 02/26/24  0445 02/25/24  1451 02/25/24  0355 02/24/24  0521 02/23/24  1708 02/23/24  0300 02/22/24  2217 02/22/24 2013   GLUCOSE mg/dL 179*  --  122* 104* 79 89 65 125*   SODIUM mmol/L 138  --  140 133* 136 138 135* 137   POTASSIUM mmol/L 4.1 3.8 3.6 3.7 3.7 3.2* 3.2* 3.2*   MAGNESIUM mg/dL  --   --   --   --   --   --  1.8 1.2*   CO2 mmol/L 17.5*  --  23.0 16.5* 20.5* 20.9* 19.8* 14.9*   CHLORIDE mmol/L 109*  --  105 100 103 96* 96* 90*   ANION GAP mmol/L 11.5  --  12.0 16.5* 12.5 21.1* 19.2* 32.1*   CREATININE mg/dL 0.67*  --  0.86 0.87 0.68* 0.74* 0.70*  0.88   BUN mg/dL 4*  --  <2* 2* 3* 5* 6 6   BUN / CREAT RATIO  6.0*  --   --  2.3* 4.4* 6.8* 8.6 6.8*   CALCIUM mg/dL 6.9*  --  7.6* 7.5* 7.4* 8.3* 7.9* 8.5*   ALK PHOS U/L 181*  --  194* 199*  --  244*  --  244*   TOTAL PROTEIN g/dL 5.5*  --  6.2 5.9*  --  6.8  --  7.6   ALT (SGPT) U/L 57*  --  65* 71*  --  100*  --  78*   AST (SGOT) U/L 158*  --  156* 284*  --  567*  --  224*   BILIRUBIN mg/dL 0.6  --  0.9 1.1  --  1.2  --  0.7   ALBUMIN g/dL 3.3*  --  3.8 3.6 3.3* 4.1  --  4.7   GLOBULIN gm/dL 2.2  --  2.4 2.3  --  2.7  --  2.9     Estimated Creatinine Clearance: 151.6 mL/min (A) (by C-G formula based on SCr of 0.67 mg/dL (L)).      Results from last 7 days   Lab Units 02/26/24  0445 02/25/24  0355 02/24/24  0521 02/23/24  0300 02/22/24 2013   WBC 10*3/mm3 5.31 4.83 5.32 5.13 4.98   RBC 10*6/mm3 3.18* 3.40* 2.95* 3.43* 3.75*   HEMOGLOBIN g/dL 10.5* 11.8* 10.0* 11.5* 12.7*   HEMATOCRIT % 30.6* 33.3* 28.8* 33.4* 36.6*   MCV fL 96.2 97.9* 97.6* 97.4* 97.6*   MCH pg 33.0 34.7* 33.9* 33.5* 33.9*   MCHC g/dL 34.3 35.4 34.7 34.4 34.7   RDW % 14.4 15.2 15.4 15.8* 15.2   RDW-SD fl 49.7 53.6 53.9 56.1* 53.9   MPV fL 11.6 11.9 11.4 10.5 10.6   PLATELETS 10*3/mm3 96* 75* 68* 81* 96*   NEUTROPHIL % %  --   --   --   --  63.3   LYMPHOCYTE % %  --   --   --   --  23.3   MONOCYTES % %  --   --   --   --  11.4   EOSINOPHIL % %  --   --   --   --  1.0   BASOPHIL % %  --   --   --   --  0.4   NEUTROS ABS 10*3/mm3  --   --   --   --  3.15   LYMPHS ABS 10*3/mm3  --   --   --   --  1.16   MONOS ABS 10*3/mm3  --   --   --   --  0.57   EOS ABS 10*3/mm3  --   --   --   --  0.05   BASOS ABS 10*3/mm3  --   --   --   --  0.02         Results from last 7 days   Lab Units 02/23/24  0511 02/22/24  2217   CK TOTAL U/L 236* 163             Results from last 7 days   Lab Units 02/24/24  0521 02/23/24  0617 02/23/24  0511   LACTATE mmol/L  --  1.5  --    PROCALCITONIN ng/mL 0.42*  --  0.60*     Results from last 7 days   Lab Units 02/23/24  0300    INR  1.19*     Microbiology Results (last 10 days)       ** No results found for the last 240 hours. **                docusate sodium, 100 mg, Oral, Daily  folic acid 1 mg in sodium chloride 0.9 % 50 mL IVPB, 1 mg, Intravenous, Daily  levETIRAcetam, 500 mg, Intravenous, Q12H  metoprolol tartrate, 12.5 mg, Oral, Q12H  nicotine, 1 patch, Transdermal, Q24H  pantoprazole, 40 mg, Intravenous, Q AM  sodium chloride, 10 mL, Intravenous, Q12H  thiamine (B-1) IV, 200 mg, Intravenous, Q8H   Followed by  [START ON 2/28/2024] thiamine, 100 mg, Oral, Daily      dexmedetomidine, 0.2-1.5 mcg/kg/hr, Last Rate: 0.6 mcg/kg/hr (02/26/24 0617)  dextrose 5 % and sodium chloride 0.9 %, 100 mL/hr, Last Rate: 100 mL/hr (02/26/24 0617)  niCARdipine, 5-15 mg/hr        Diagnostics:  CT Head Without Contrast    Result Date: 2/24/2024  Patient: JOSÉ HORTON  Time Out: 06:30 Exam(s): CT HEAD Without Contrast EXAM:   CT Head Without Intravenous Contrast CLINICAL HISTORY:    Reason for exam: Follow-up traumatic subdural hematoma. TECHNIQUE:   Axial computed tomography images of the head brain without intravenous contrast.  CTDI is 54.92 mGy and DLP is 1037.7 mGy-cm.  This CT exam was performed according to the principle of ALARA (As Low As Reasonably Achievable) by using one or more of the following dose reduction techniques: automated exposure control, adjustment of the mA and or kV according to patient size, and or use of iterative reconstruction technique. COMPARISON:   Noncontrast head CT 02 23 2024 0847 hrs. FINDINGS:   Brain:  Stable acute subdural hematoma over the left cerebral hemisphere measuring up to 5 mm is present with interval improvement in superimposed acute subarachnoid hemorrhage best appreciated in the left frontal sulci between the superior and middle frontal gyri.  No significant white matter disease.   Ventricles:  Unremarkable.  No ventriculomegaly.   Bones joints:  Unremarkable.  No acute fracture.   Soft tissues:   Unremarkable.   Sinuses:  Unremarkable as visualized.  No acute sinusitis.   Mastoid air cells:  Unremarkable as visualized.  No mastoid effusion. IMPRESSION:       Stable acute subdural hematoma over the left cerebral hemisphere measuring up to 5 mm is present with interval improvement in superimposed acute subarachnoid hemorrhage best appreciated in the left frontal sulci between the superior and middle frontal gyri.      Electronically signed by Steven Zhao MD on 02-24-24 at 0630    EEG Continuous Monitoring With Video    Result Date: 2/23/2024  Date of onset: 2/23/24 Date of offset: 2/23/24 Indication: seizure like activity, epilepsy vs alcohol withdrawal Technical description:  This is a 21 channel digital EEG recording that began on 1133 to 1159 with 2 minute pause to 1201 then ending 1409.  Background:  The background consists of a posteriorly dominant rhythm that is notably absent.  Anteriorly, there is as mixture of theta, alpha, beta, and delta activity.  There is fair spontaneous variability.  Hyperventilation and photic stimulation were not performed.  There is no focal slowing.  There are no interictal epileptiform discharges and no electrographic seizures noted during the study.  EKG demonstrates normal rate.  Impression: This is an abnormal 2 to 12 hour EEG study due to the presence of diffuse theta slowing with alpha, beta and some delta slowing seen.  There are no interictal epileptiform discharges and no electrographic seizures.  These electrophysiologic findings are indicative of mild to moderate encephalopathy. In addition, beta activity is seen; this can be due to medication effect, often seen with medications such as benzodiazepines.     CT Head Without Contrast    Result Date: 2/23/2024  CT HEAD WITHOUT CONTRAST  HISTORY: Intracranial hemorrhage, follow-up.  COMPARISON: CT head 02/23/2024.  FINDINGS: A septum cavum pellucidum is noted. A small to moderate-sized supraorbital hematoma is  appreciated on the right similar in appearance as compared to the prior examination. A subdural hematoma is appreciated overlying the left cerebral hemisphere anteriorly, laterally and superolaterally which has increased in attenuation as compared to 02/23/2024. It measures approximately 6 mm in thickness as measured perpendicular to the inner table on the coronal reconstructions. Subarachnoid blood is appreciated overlying the anterior and superior aspect of the left frontal lobe, unchanged. There was no evidence of midline shift, acute infarction or of intra-axial hemorrhage.      The pre-existing subdural hematoma overlying the left cerebral hemisphere is unchanged in size measuring approximately 6 mm in thickness. It has increased in attenuation however. Subarachnoid blood overlying the left frontal lobe is noted, present previously and unchanged. Continued surveillance is recommended.  Radiation dose reduction techniques were utilized, including automated exposure control and exposure modulation based on body size.   This report was finalized on 2/23/2024 4:49 PM by Dr. Cj Cole M.D on Workstation: [id number]      Duplex Portal Hepatic Complete CAR    Result Date: 2/23/2024    All vessels appear normal with normal flow direction and no evidence of thrombus.     CT Abdomen Pelvis Without Contrast    Result Date: 2/23/2024  CT SCAN OF THE ABDOMEN AND PELVIS WITHOUT CONTRAST  HISTORY: Subarachnoid hemorrhage. Nausea and vomiting.  FINDINGS: The CT scan was performed through the abdomen and pelvis without contrast. The following findings are present: 1. The lung bases are clear. There is extensive low density throughout the liver consistent with severe hepatic steatosis. No focal liver lesions are seen. The spleen is not enlarged. The gallbladder shows no gallstones or wall thickening. 2. There is some vague increased density and suggestion of slight haziness in the region of the head of pancreas and suspicious  for changes of mild acute pancreatitis and further clinical correlation is recommended. 3. The adrenal glands and both kidneys are unremarkable. There is no aortic aneurysm or retroperitoneal lymphadenopathy. The large and small bowel loops are normal in caliber and show no inflammatory change. In the pelvis, the urinary bladder has a smooth contour.   Radiation dose reduction techniques were utilized, including automated exposure control and exposure modulation based on body size.   This report was finalized on 2/23/2024 12:27 PM by Dr. Sg Price M.D on Workstation: [id number]      CT Head Without Contrast    Result Date: 2/23/2024  CT OF THE HEAD WITHOUT CONTRAST; CT OF THE CERVICAL SPINE  HISTORY: Seizure  COMPARISON: February 22, 2024  TECHNIQUE: Axial CT imaging was obtained through the brain. No IV contrast was administered. Axial CT imaging was obtained through the cervical spine. Coronal and sagittal reformatted images were obtained.  FINDINGS: CT OF THE HEAD: The patient has a mixed density (although predominantly hyperdense) subdural hematoma overlying the left cerebral convexity. Maximum thickness is 1.1 cm. It was not visible on the earlier study. There is midline shift, measuring up to 6 mm. There is trace left parafalcine subdural hemorrhage, measuring up to 2 mm, likely also a trace amount overlying the left tentorium. The patient also appears to have a small amount of subarachnoid hemorrhage. No calvarial fracture is seen. Mucosal thickening is noted within the ethmoid sinuses. There is a right supraorbital scalp hematoma/laceration. Right globe is intact. There is no post septal extension.  CT OF THE CERVICAL SPINE: No acute fracture or subluxation of the cervical spine is seen. Cervical vertebral body alignment appears within normal limits. There is no prevertebral soft tissue swelling. Images through the lung apices demonstrate emphysematous changes, which is an advanced finding for the age of  29. Thyroid gland and trachea are within normal limits.       1. Left cerebral convexity subdural hematoma with a maximum thickness of 1.1 cm. Midline shift of 6 mm. There is also trace subarachnoid hemorrhage, as well as trace left parafalcine and left tentorial hemorrhage. 2. No acute fracture or subluxation of the cervical spine is seen.  FINDINGS were discussed with Dr. Mckeon at 12:52 a.m.    Radiation dose reduction techniques were utilized, including automated exposure control and exposure modulation based on body size.   This report was finalized on 2/23/2024 12:55 AM by Dr. Alexa Ferrer M.D on Workstation: [id number]      CT Cervical Spine Without Contrast    Result Date: 2/23/2024  CT OF THE HEAD WITHOUT CONTRAST; CT OF THE CERVICAL SPINE  HISTORY: Seizure  COMPARISON: February 22, 2024  TECHNIQUE: Axial CT imaging was obtained through the brain. No IV contrast was administered. Axial CT imaging was obtained through the cervical spine. Coronal and sagittal reformatted images were obtained.  FINDINGS: CT OF THE HEAD: The patient has a mixed density (although predominantly hyperdense) subdural hematoma overlying the left cerebral convexity. Maximum thickness is 1.1 cm. It was not visible on the earlier study. There is midline shift, measuring up to 6 mm. There is trace left parafalcine subdural hemorrhage, measuring up to 2 mm, likely also a trace amount overlying the left tentorium. The patient also appears to have a small amount of subarachnoid hemorrhage. No calvarial fracture is seen. Mucosal thickening is noted within the ethmoid sinuses. There is a right supraorbital scalp hematoma/laceration. Right globe is intact. There is no post septal extension.  CT OF THE CERVICAL SPINE: No acute fracture or subluxation of the cervical spine is seen. Cervical vertebral body alignment appears within normal limits. There is no prevertebral soft tissue swelling. Images through the lung apices demonstrate  emphysematous changes, which is an advanced finding for the age of 29. Thyroid gland and trachea are within normal limits.       1. Left cerebral convexity subdural hematoma with a maximum thickness of 1.1 cm. Midline shift of 6 mm. There is also trace subarachnoid hemorrhage, as well as trace left parafalcine and left tentorial hemorrhage. 2. No acute fracture or subluxation of the cervical spine is seen.  FINDINGS were discussed with Dr. Mckeon at 12:52 a.m.    Radiation dose reduction techniques were utilized, including automated exposure control and exposure modulation based on body size.   This report was finalized on 2/23/2024 12:55 AM by Dr. Alexa Ferrer M.D on Workstation: [id number]      XR Chest 1 View    Result Date: 2/22/2024  XR CHEST 1 VW-  HISTORY: 29-year-old male with seizure.  FINDINGS: There is no evidence for pneumonia, effusions, CHF, or pneumothorax.  This report was finalized on 2/22/2024 8:45 PM by Dr. Kimmy Carmona M.D on Workstation: [id number]      CT Head Without Contrast    Result Date: 2/22/2024  CT HEAD WO CONTRAST-  INDICATIONS: Seizure  TECHNIQUE: Radiation dose reduction techniques were utilized, including automated exposure control and exposure modulation based on body size. Noncontrast head CT  COMPARISON: None available  FINDINGS:    No acute intracranial hemorrhage, midline shift or mass effect. No acute territorial infarct is identified.   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 Ventricles, cisterns, cerebral sulci are otherwise unremarkable for patient age.  The visualized paranasal sinuses, orbits, mastoid air cells are unremarkable.        No acute intracranial hemorrhage or hydrocephalus. Cyst of the septum pellucidum.  For further evaluation of the cause of patient's seizure, if indicated, enhanced MRI is advised.  This report was finalized on 2/22/2024 8:41 PM by Dr. Albert Moran M.D on  Workstation: [id number]              Active Hospital Problems    Diagnosis  POA    **Subdural hematoma [S06.5XAA]  Yes      Resolved Hospital Problems   No resolved problems to display.         Assessment & Plan     Intracranial hemorrhage with both subdural and subarachnoid hemorrhage traumatic left cerebral hemisphere.  Neurosurgery.  Stable from their standpoint for discharge will see him in 2 weeks in our office with follow-up CT scan  Seizure most likely alcohol withdrawal although with the trauma and bleed discussed with neurology and they recommend continuing Keppra for several months and following up with them as an outpatient in a few months.  Neurology has reviewed seizure precautions with the patient and his family  Alcohol abuse and  alcohol withdrawal patient on CIWA protocol and thiamine.  Patient is pretty confused today in addition to benzodiazepines they had to add dexmedetomidine drip last night.  He will need to look into outpatient treatment when he is ready for discharge.  A-fib with rapid ventricular response reported I do not see any EKGs with any definite A-fib documented he is sinus now we will continue to monitor  Alcoholic ketoacidosis improving  Acute alcoholic hepatitis as hepatitis panel negative and hepatic Doppler negative.  He is starting to improve.  Discriminant function does not suggest steroids warranted at this time.  Continue to follow  Tobacco abuse apparently patient is a smoker asking for cigarettes will put a NicoDerm patch on.  Anemia macrocytic probably deficient on replacement  Hyperglycemia  Fluids/electrolytes/nutrition hyponatremia resolved  Thrombocytopenia suspect alcoholic marrow suppression improving.  Elevated procalcitonin it is falling normal white count ,afebrile no clear infection source we will continue to follow.  Infection.  Laceration over right eye with multiple sutures skin a large ecchymoses no signs of infection the edema has gone down  significantly.    Plan for disposition:    Caleb Tena Jr, MD  02/26/24  07:01 EST    Time: Critical care time 34 minutes      Electronically signed by Caleb Tena Jr., MD at 02/26/24 0911          All medication doses during the admission are shown, including meds that are no longer on order.  Scheduled Meds Sorted by Name  for Nida Peck as of 2/25/24 through 2/27/24    1 Day 3 Days 7 Days 10 Days < Today >   Legend:       Medications 02/25/24 02/26/24 02/27/24    calcium gluconate 1000 Mg/50ml 0.675% NaCl IV SOLN  Dose: 1,000 mg  Freq: Every 1 Hour Route: IV  Start: 02/26/24 0845 End: 02/26/24 0922     0822           Followed by  calcium gluconate 2000-675 MG/100ML NACL IVPB  Dose: 2,000 mg  Freq: Every 2 Hours Route: IV  Start: 02/26/24 0945 End: 02/26/24 1631     0923   1123   1431         docusate sodium (COLACE) capsule 100 mg  Dose: 100 mg  Freq: Daily Route: PO  Start: 02/23/24 0900   Admin Instructions:       [phone] (0092) [C]          folic acid 1 mg in sodium chloride 0.9 % 50 mL IVPB  Dose: 1 mg  Freq: Daily Route: IV  Last Dose: 1 mg (02/27/24 0829)  Start: 02/23/24 0900   Admin Instructions:       0943        0923        0829          levETIRAcetam (KEPPRA) injection 1,000 mg  Dose: 1,000 mg  Freq: Once Route: IV  Start: 02/23/24 0109 End: 02/23/24 0110   Admin Instructions:            levETIRAcetam (KEPPRA) injection 500 mg  Dose: 500 mg  Freq: Every 12 Hours Scheduled Route: IV  Start: 02/23/24 1015   Admin Instructions:       0832   2005       0808   2147       0830   2100         lidocaine 1% - EPINEPHrine 1:205508 (XYLOCAINE W/EPI) 1 %-1:136717 injection 10 mL  Dose: 10 mL  Freq: Once Route: IJ  Start: 02/23/24 0002 End: 02/23/24 0124   Admin Instructions:             LORazepam (ATIVAN) injection 2 mg  Dose: 2 mg  Freq: Every 6 Hours Route: IV  Start: 02/23/24 0148 End: 02/23/24 2045   Admin Instructions:            Followed by  LORazepam (ATIVAN) injection 1  mg  Dose: 1 mg  Freq: Every 6 Hours Route: IV  Start: 02/24/24 0149 End: 02/26/24 0148   Admin Instructions:       (8140) [C]   (5912) [C]   (2807) [C]     2006 0148-D/C'd         magnesium oxide (MAG-OX) tablet 800 mg  Dose: 800 mg  Freq: Once Route: PO  Start: 02/22/24 2121 End: 02/22/24 2118   Admin Instructions:            magnesium sulfate 2g/50 mL (PREMIX) infusion  Dose: 2 g  Freq: Once Route: IV  Last Dose: Stopped (02/22/24 2312)  Start: 02/22/24 2121 End: 02/22/24 2312         metoprolol tartrate (LOPRESSOR) tablet 12.5 mg  Dose: 12.5 mg  Freq: Every 12 Hours Scheduled Route: PO  Start: 02/23/24 2100 End: 02/27/24 0838   Admin Instructions:       0832   2005       0809   2147       0828   0838-D/C'd       metoprolol tartrate (LOPRESSOR) tablet 25 mg  Dose: 25 mg  Freq: Every 12 Hours Scheduled Route: PO  Start: 02/27/24 2100   Admin Instructions:         2100          nicotine (NICODERM CQ) 21 MG/24HR patch 1 patch  Dose: 1 patch  Freq: Every 24 Hours Scheduled Route: TD  Start: 02/25/24 1045   Admin Instructions:       1032        0808   0814       0755   0828         ondansetron (ZOFRAN) injection 4 mg  Dose: 4 mg  Freq: Once Route: IV  Start: 02/23/24 0028 End: 02/23/24 0017   Admin Instructions:            pantoprazole (PROTONIX) injection 40 mg  Dose: 40 mg  Freq: Every Early Morning Route: IV  Indications of Use: STRESS ULCER PROPHYLAXIS  Start: 02/23/24 0600   Admin Instructions:       0518        0611        0610          potassium chloride (K-DUR,KLOR-CON) ER tablet 40 mEq  Dose: 40 mEq  Freq: Once Route: PO  Start: 02/22/24 2121 End: 02/22/24 2118   Admin Instructions:            potassium chloride (KLOR-CON) packet 40 mEq  Dose: 40 mEq  Freq: Every 4 Hours Route: PO  Start: 02/23/24 0500 End: 02/23/24 0748   Admin Instructions:            potassium chloride 10 mEq in 100 mL IVPB  Dose: 10 mEq  Freq: Every 1 Hour Route: IV  Last Dose: 10 mEq (02/25/24 0945)  Start: 02/25/24 0615 End:  02/25/24 1045   Admin Instructions:       0537   0630   0832     0945            potassium phosphate 15 mmol in 0.9% normal saline 250 mL IVPB  Dose: 15 mmol  Freq: Once Route: IV  Start: 02/24/24 0745 End: 02/24/24 1207         potassium phosphate 15 mmol in 0.9% normal saline 250 mL IVPB  Dose: 15 mmol  Freq: Once Route: IV  Start: 02/23/24 2145 End: 02/24/24 0229         potassium phosphate 15 mmol in 0.9% normal saline 250 mL IVPB  Dose: 15 mmol  Freq: Every 3 Hours Route: IV  Start: 02/23/24 0445 End: 02/23/24 1213         sodium chloride 0.9 % bolus 1,000 mL  Dose: 1,000 mL  Freq: Once Route: IV  Last Dose: 1,000 mL (02/23/24 0431)  Start: 02/23/24 0515 End: 02/23/24 0531         sodium chloride 0.9 % bolus 1,000 mL  Dose: 1,000 mL  Freq: Once Route: IV  Last Dose: Stopped (02/22/24 2117)  Start: 02/22/24 2018 End: 02/22/24 2117         sodium chloride 0.9 % flush 10 mL  Dose: 10 mL  Freq: Every 12 Hours Scheduled Route: IV  Start: 02/23/24 0138    0833   2006 0809 (6195) 7422   2100         Tetanus-Diphth-Acell Pertussis (BOOSTRIX) injection 0.5 mL  Dose: 0.5 mL  Freq: Once Route: IM  Start: 02/23/24 0002 End: 02/23/24 0027   Admin Instructions:             thiamine (B-1) injection 200 mg  Dose: 200 mg  Freq: Every 8 Hours Scheduled Route: IV  Start: 02/23/24 0600 End: 02/28/24 0559   Admin Instructions:       0518   1411   2119      0611   1434   2147      0610   1400   2200        Followed by  thiamine (VITAMIN B-1) tablet 100 mg  Dose: 100 mg  Freq: Daily Route: PO  Start: 02/28/24 0900                     Continuous Meds Sorted by Name  for Nida Peck as of 2/25/24 through 2/27/24  Legend:       Medications 02/25/24 02/26/24 02/27/24   dexmedetomidine (PRECEDEX) 400 mcg in 100 mL NS infusion  Rate: 3.3-24.6 mL/hr Dose: 0.2-1.5 mcg/kg/hr  Weight Dosing Info: 65.7 kg  Freq: Titrated Route: IV  Last Dose: Stopped (02/26/24 0952)  Start: 02/25/24 0045   Admin Instructions:      Order specific  questions:       0001   0009   0024     1410   1619   2127      0315   0617   0900     0934   0952          dextrose 5 % and sodium chloride 0.9 % infusion  Rate: 100 mL/hr Dose: 100 mL/hr  Freq: Continuous Route: IV  Last Dose: 100 mL/hr (02/27/24 0506)  Start: 02/23/24 1815    2127        0617   1123   2000 [C]     1617 1996 6546         niCARdipine (CARDENE) 25 mg in 250 mL NS infusion kit  Rate:  mL/hr Dose: 5-15 mg/hr  Freq: Titrated Route: IV  Start: 02/23/24 0138   Admin Instructions:            sodium chloride 0.9 % infusion  Rate: 150 mL/hr Dose: 150 mL/hr  Freq: Continuous Route: IV  Last Dose: Stopped (02/23/24 1718)  Start: 02/23/24 0138 End: 02/23/24 1715                     PRN Meds Sorted by Name  for CiscoNida as of 2/25/24 through 2/27/24  Legend:       Medications 02/25/24 02/26/24 02/27/24    acetaminophen (TYLENOL) tablet 650 mg  Dose: 650 mg  Freq: Every 4 Hours PRN Route: PO  PRN Reasons: Mild Pain,Fever  PRN Comment: Temperature greater than or equal to 37 C  Start: 02/23/24 0120   Admin Instructions:            Or  acetaminophen (TYLENOL) suppository 650 mg  Dose: 650 mg  Freq: Every 4 Hours PRN Route: RE  PRN Reasons: Mild Pain,Fever  PRN Comment: Temperature greater than or equal to 37 C  Start: 02/23/24 0120   Admin Instructions:            aluminum-magnesium hydroxide-simethicone (MAALOX MAX) 400-400-40 MG/5ML suspension 7.5 mL  Dose: 7.5 mL  Freq: Every 4 Hours PRN Route: PO  PRN Reasons: Indigestion,Heartburn  Start: 02/23/24 0120   Admin Instructions:            bisacodyl (DULCOLAX) suppository 10 mg  Dose: 10 mg  Freq: Daily PRN Route: RE  PRN Reason: Constipation  Start: 02/23/24 0120   Admin Instructions:            Calcium Replacement - Follow Nurse / BPA Driven Protocol  Freq: As Needed Route: XX  PRN Reason: Other  Start: 02/23/24 0123   Admin Instructions:            cloNIDine (CATAPRES) tablet 0.1 mg  Dose: 0.1 mg  Freq: Every 4 Hours PRN Route: PO  PRN  Reason: High Blood Pressure  PRN Comment: SBP Greater Than 160  Start: 02/23/24 0129   Admin Instructions:        1514 [C]           influenza vac split quad (FLUZONE,FLUARIX,AFLURIA,FLULAVAL) injection 0.5 mL  Dose: 0.5 mL  Freq: During Hospitalization Route: IM  PRN Reason: Immunization  Start: 02/23/24 0731   Admin Instructions:            loperamide (IMODIUM) capsule 2 mg  Dose: 2 mg  Freq: 3 Times Daily PRN Route: PO  PRN Reason: Diarrhea  Start: 02/26/24 2225   Admin Instructions:             LORazepam (ATIVAN) tablet 1 mg  Dose: 1 mg  Freq: Every 1 Hour PRN Route: PO  PRN Reason: Withdrawal  PRN Comment: For CIWA-Ar 8-10  Start: 02/23/24 0132 End: 02/28/24 0131   Admin Instructions:                                                Or  LORazepam (ATIVAN) injection 1 mg  Dose: 1 mg  Freq: Every 1 Hour PRN Route: IV  PRN Reason: Withdrawal  PRN Comment: For CIWA-Ar 8-10  Start: 02/23/24 0132 End: 02/28/24 0131   Admin Instructions:                                                Or  LORazepam (ATIVAN) tablet 2 mg  Dose: 2 mg  Freq: Every 1 Hour PRN Route: PO  PRN Reason: Withdrawal  PRN Comment: For CIWA-Ar 11-15 or  -160, DBP , or -125  Start: 02/23/24 0132 End: 02/28/24 0131   Admin Instructions:                                                Or  LORazepam (ATIVAN) injection 2 mg  Dose: 2 mg  Freq: Every 1 Hour PRN Route: IV  PRN Reason: Withdrawal  PRN Comment: 11-15 or  -160, DBP , or -125  Start: 02/23/24 0132 End: 02/28/24 0131   Admin Instructions:                        1411     2119        0440           Or  LORazepam (ATIVAN) injection 2 mg  Dose: 2 mg  Freq: Every 15 Minutes PRN Route: IV  PRN Reason: Withdrawal  PRN Comment: For CIWA-Ar Greater Than 15 or SBP >160, DBP >110, or HR >125. Repeat Dose in 15 Minutes if CIWA-Ar Does Not Decrease  Start: 02/23/24 0132 End: 02/28/24 0131   Admin Instructions:       0015   0137   0401     0724   1039                            Or  LORazepam (ATIVAN) injection 2 mg  Dose: 2 mg  Freq: Every 15 Minutes PRN Route: IM  PRN Reason: Withdrawal  PRN Comment: If Unable to Administer IV - For CIWA-Ar Greater Than 15 or SBP >160, DBP >110, or HR >125. Repeat Dose in 15 Minutes if CIWA-Ar Does Not Decrease Repeat Dose in 15 Minutes if CIWA-Ar Does Not Decrease  Start: 02/23/24 0132 End: 02/28/24 0131   Admin Instructions:                                                Or  LORazepam (ATIVAN) tablet 4 mg  Dose: 4 mg  Freq: Every 1 Hour PRN Route: PO  PRN Reason: Withdrawal  PRN Comment: For CIWA-Ar Greater Than 15 After 2 Doses of 2 mg IV/IM.  Repeat Dose in 1 Hour if CIWA-Ar Does Not Decrease  Start: 02/23/24 0132 End: 02/28/24 0131   Admin Instructions:                                                Or  LORazepam (ATIVAN) injection 4 mg  Dose: 4 mg  Freq: Every 1 Hour PRN Route: IV  PRN Reason: Withdrawal  PRN Comment: If Unable to Administer IV - For CIWA-Ar Greater Than 15 After 2 Doses of 2 mg IV/IM.  Repeat Dose in 1 Hour if CIWA-Ar Does Not Decrease  Start: 02/23/24 0132 End: 02/28/24 0131   Admin Instructions:                                                Magnesium Standard Dose Replacement - Follow Nurse / BPA Driven Protocol  Freq: As Needed Route: XX  PRN Reason: Other  Start: 02/23/24 0123   Admin Instructions:            ondansetron (ZOFRAN) injection 4 mg  Dose: 4 mg  Freq: Every 6 Hours PRN Route: IV  PRN Reasons: Nausea,Vomiting  Start: 02/23/24 0120         Phosphorus Replacement - Follow Nurse / BPA Driven Protocol  Freq: As Needed Route: XX  PRN Reason: Other  Start: 02/23/24 0123   Admin Instructions:            Potassium Replacement - Follow Nurse / BPA Driven Protocol  Freq: As Needed Route: XX  PRN Reason: Other  Start: 02/23/24 0123   Admin Instructions:            sodium chloride 0.9 % flush 10 mL  Dose: 10 mL  Freq: As Needed Route: IV  PRN Reason: Line Care  Start: 02/23/24 0120         sodium chloride 0.9 % infusion 40  mL  Dose: 40 mL  Freq: As Needed Route: IV  PRN Reason: Line Care  Start: 02/23/24 0120   Admin Instructions:

## 2024-02-27 NOTE — THERAPY DISCHARGE NOTE
"Acute Care - Speech Language Pathology Initial Evaluation/Discharge  [facility]     Patient Name: Nida Peck  : 1995  MRN: [id number]  Today's Date: 2024               Admit Date: 2024     Visit Dx:    ICD-10-CM ICD-9-CM   1. Subdural hematoma  S06.5XAA 432.1   2. Seizure-like activity  R56.9 780.39   3. Hypomagnesemia  E83.42 275.2   4. Hypokalemia  E87.6 276.8   5. Elevated liver enzymes  R74.8 790.5   6. Macrocytic anemia  D53.9 281.9   7. Hyperglycemia  R73.9 790.29   8. Thrombocytopenia  D69.6 287.5   9. Laceration of forehead, initial encounter  S01.81XA 873.42   10. Subarachnoid bleed  I60.9 430     Patient Active Problem List   Diagnosis    Subdural hematoma     History reviewed. No pertinent past medical history.  History reviewed. No pertinent surgical history.    SLP Recommendation and Plan  SLP Diagnosis: mild, cognitive-linguistic disorder (24 1100)        [facility] Sk[place] Interventions Met: other (see comments) (pt refused tx) (24 1100)        Therapy Frequency (SLP SLC): evaluation only, other (see comments) (pt refused treatment, RN aware) (24 1100)                                     Plan of Care Reviewed With: patient (24 09)  Outcome Evaluation: Patient seen for cognitive-linguistic assessment. Testing revealed mild deficits with working memory and functional math. Pt feels he is at his neurological baseline and refused cognitive therapy despite encouragement and education. SLP to sign off. (24 0953)    SLP EVALUATION (last 72 hours)       SLP SLC Evaluation       Row Name 24       Communication Assessment/Intervention    Document Type evaluation  -SH    Patient Effort adequate  -SH       General Information    Patient Profile Reviewed yes  -SH    Pertinent History Of Current Problem \"1. Intracranial hemorrhage with both subdural and subarachnoid hemorrhage traumatic left cerebral hemisphere.  Neurosurgery.  Stable " "from their standpoint for discharge will see him in 2 weeks in our office with follow-up CT scan  2. Seizure most likely alcohol withdrawal although with the trauma and bleed discussed with neurology and they recommend continuing Keppra for several months and following up with them as an outpatient in a few months.  Neurology has reviewed seizure precautions with the patient and his family  3. Alcohol abuse and  alcohol withdrawal patient on CIWA protocol and thiamine.  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  -    Prior Level of Function-Communication WFL  -    Plans/Goals Discussed with patient;agreed upon  -    Barriers to Rehab cognitive status  -       Pain    Additional Documentation Pain Scale: FACES Pre/Post-Treatment (Group)  -       Pain Scale: FACES Pre/Post-Treatment    Pain: FACES Scale, Pretreatment 0-->no hurt  -       Cognitive Assessment Intervention- SLP    Cognitive Function (Cognition) mild impairment  -SH    Orientation Status (Cognition) awareness of basic personal information;person;place;time;WFL  -SH    Memory (Cognitive) simple;immediate;WFL;delayed;mild impairment  -SH    Attention (Cognitive) sustained;mild impairment  -SH    Thought Organization (Cognitive) concrete divergent;WFL  -SH    Reasoning (Cognitive) simple;WFL  -SH    Problem Solving (Cognitive) multifactorial;mild impairment  -SH    Functional Math (Cognitive) money calculation;mild impairment  -SH    Executive Function (Cognition) deficit awareness;mild impairment  -SH    Pragmatics (Communication) affect;mild impairment  -SH    Cognition, Comment Patient seen for cognitive-linguistic assessment. Testing revealed mild deficits with working memory and functional math. Pt feels he is at his neurological baseline and " refused cognitive therapy despite encouragement and education. RN and charge RN notified regarding patient's refusal. Pt does request continued counseling with psychiatry. SLP to sign off.  -       SLP Evaluation Clinical Impressions    SLP Diagnosis mild;cognitive-linguistic disorder  -    Rehab Potential/Prognosis good  -[facility] Criteria for Skilled Therapy Interventions Met other (see comments)  pt refused tx  -       Recommendations    Therapy Frequency ([facility]) evaluation only;other (see comments)  pt refused treatment, RN aware  -              User Key  (r) = Recorded By, (t) = Taken By, (c) = Cosigned By      Initials Name Effective Dates     Rama Braga, SLP 01/05/24 -                        EDUCATION  The patient has been educated in the following areas:   Cognitive Impairment.           SLP GOALS       Row Name 02/26/24 1300             (STG) Patient will tolerate trials of    Consistencies Trialed (Tolerate trials) regular textures;thin liquids  -      Progress/Outcomes (Tolerate trials) goal met  -      Comment (Tolerate trials) Pt seen for diet tolerance with lunch. Pt had lunch on tray as SLP entered room. Pt with decreased initiation to start the lunch as it had been sitting there for awhile. SLP encouraged pt to eat and assisted with set up. Pt ate regular diet and thin with no s/s. Will s/o from dysphagia therapy, but follow pt for a cog eval. Pt denied deficits, however, slow processing is evident. Will proceed with eval as able.  -                User Key  (r) = Recorded By, (t) = Taken By, (c) = Cosigned By      Initials Name Provider Type    Laxmi Pillai, SLP Speech and Language Pathologist                        Time Calculation:    Time Calculation- SLP       Row Name 02/27/24 1120             Time Calculation- SLP    SLP Start Time 1000  -      SLP Received On 02/27/24  -         Untimed Charges    51688-VW Eval Speech and Production w/ Language Minutes 60  -          Total Minutes    Untimed Charges Total Minutes 60  -SH       Total Minutes 60  -SH                User Key  (r) = Recorded By, (t) = Taken By, (c) = Cosigned By      Initials Name Provider Type    Rama Campa SLP Speech and Language Pathologist                    Therapy Charges for Today       Code Description Service Date Service Provider Modifiers Qty    [id number] [facility] EVAL SPEECH AND PROD W LANG  4 2/27/2024 Rama Braga SLP GN 1                          BETTE Velázquez  2/27/2024

## 2024-02-27 NOTE — CASE MANAGEMENT/SOCIAL WORK
Continued Stay Note  [facility]     Patient Name: Nida Peck  MRN: [id number]  Today's Date: 2/27/2024    Admit Date: 2/22/2024    Plan: Home with brother   Discharge Plan       Row Name 02/27/24 1651       Plan    Plan Home with brother    Plan Comments CCP met with patient at bedside. Discussed a safe discharge plan. Patient states he will go home ([address]) with his brother Jeremías ([phone]). CCP offered HH and OP therapy and patient refused. Patient stated his brother can assist him at home and he does not need anything. CCP following.                   Discharge Codes    No documentation.

## 2024-02-27 NOTE — PLAN OF CARE
Problem: Adult Inpatient Plan of Care  Goal: Plan of Care Review  Outcome: Ongoing, Progressing  Goal: Patient-Specific Goal (Individualized)  Outcome: Ongoing, Progressing  Goal: Absence of Hospital-Acquired Illness or Injury  Outcome: Ongoing, Progressing  Intervention: Identify and Manage Fall Risk  Recent Flowsheet Documentation  Taken 2/26/2024 2000 by Eduardo Patel RN  Safety Promotion/Fall Prevention:   activity supervised   safety round/check completed  Intervention: Prevent Infection  Recent Flowsheet Documentation  Taken 2/26/2024 2000 by Eduardo Patel RN  Infection Prevention: single patient room provided  Goal: Optimal Comfort and Wellbeing  Outcome: Ongoing, Progressing  Goal: Readiness for Transition of Care  Outcome: Ongoing, Progressing     Problem: Skin Injury Risk Increased  Goal: Skin Health and Integrity  Outcome: Ongoing, Progressing     Problem: Fall Injury Risk  Goal: Absence of Fall and Fall-Related Injury  Outcome: Ongoing, Progressing  Intervention: Identify and Manage Contributors  Recent Flowsheet Documentation  Taken 2/26/2024 2000 by Eduardo Patel RN  Medication Review/Management: medications reviewed  Intervention: Promote Injury-Free Environment  Recent Flowsheet Documentation  Taken 2/26/2024 2000 by Eduardo Patel RN  Safety Promotion/Fall Prevention:   activity supervised   safety round/check completed   Goal Outcome Evaluation:

## 2024-02-27 NOTE — PLAN OF CARE
Goal Outcome Evaluation:  Plan of Care Reviewed With: patient           Outcome Evaluation: Patient seen for cognitive-linguistic assessment. Testing revealed mild deficits with working memory and functional math. Pt feels he is at his neurological baseline and refused cognitive therapy despite encouragement and education. RN and charge RN notified regarding patient's refusal. Pt does request continued counseling with psychiatry. SLP to sign off.

## 2024-02-27 NOTE — PROGRESS NOTES
[facility] Acute Rehab    Referral received. Noted much improvement with today's therapy and now plans home with home health. No need for Acute Rehab at this point (too high level)  Will sign off  CCP informed    Pari Thornton RN  Acute Rehab Admission Nurse

## 2024-02-27 NOTE — PROGRESS NOTES
"Nutrition Services    Patient Name:  Nida Peck  YOB: 1995  MRN: [id number]  Admit Date:  2/22/2024    Assessment Date:  02/27/24    Summary: Diet advanced to regular on the 23rd. He is tolerating it well % per RN. Labs, meds, skin reviewed. BUN 5, phos 2.3, Alkp 220, Hgb 10.9. Pt wanting to leave AMA but mom has convinced him to stay (per RN). Pt currently resting.    Plan/Recommendations:  Encourage po intake  Monitor lytes and replace as needed.    Will follow clinical course, nutrition needs.    CLINICAL NUTRITION ASSESSMENT      Reason for Assessment Follow-up Protocol     Diagnosis/Problem   Seizure, alcohol withdrawal, SDH, SAH, metabolic acidosis, thrombocytopenia   Medical/Surgical History History reviewed. No pertinent past medical history.    History reviewed. No pertinent surgical history.     Anthropometrics        Current Height  Current Weight  BMI kg/m2 Height: 185.4 cm (73\")  Weight: 67.1 kg (147 lb 14.9 oz) (02/25/24 0400)  Body mass index is 19.52 kg/m².   Adjusted BMI (if applicable)    BMI Category Underweight (18.4 or below)   Ideal Body Weight (IBW) 80kg   Usual Body Weight (UBW) unknown   Weight Trend Unknown  per EMR 135lbs in 2022   Weight History Wt Readings from Last 30 Encounters:   02/25/24 0400 67.1 kg (147 lb 14.9 oz)   02/23/24 0311 65.7 kg (144 lb 13.5 oz)   02/22/24 1954 74.8 kg (165 lb)      --  Labs       Pertinent Labs    Results from last 7 days   Lab Units 02/27/24  0330 02/26/24  0445 02/25/24  1451 02/25/24  0355   SODIUM mmol/L 139 138  --  140   POTASSIUM mmol/L 3.8 4.1 3.8 3.6   CHLORIDE mmol/L 106 109*  --  105   CO2 mmol/L 22.0 17.5*  --  23.0   BUN mg/dL 5* 4*  --  <2*   CREATININE mg/dL 0.88 0.67*  --  0.86   CALCIUM mg/dL 8.0* 6.9*  --  7.6*   BILIRUBIN mg/dL 0.6 0.6  --  0.9   ALK PHOS U/L 220* 181*  --  194*   ALT (SGPT) U/L 59* 57*  --  65*   AST (SGOT) U/L 138* 158*  --  156*   GLUCOSE mg/dL 91 179*  --  122*     Results from last 7 days "   Lab Units 02/27/24  0330 02/23/24  1617 02/23/24  0300 02/22/24 2217 02/22/24 2013 02/22/24 2013   MAGNESIUM mg/dL  --   --   --  1.8  --  1.2*   PHOSPHORUS mg/dL 2.3*   < > 1.5* 1.5*   < >  --    HEMOGLOBIN g/dL 10.8*   < > 11.5*  --   --  12.7*   HEMATOCRIT % 32.3*   < > 33.4*  --   --  36.6*   WBC 10*3/mm3 6.14   < > 5.13  --   --  4.98   TRIGLYCERIDES mg/dL  --   --  88  --   --   --    ALBUMIN g/dL 3.5   < > 4.1  --   --  4.7    < > = values in this interval not displayed.     Results from last 7 days   Lab Units 02/27/24  0330 02/26/24  0445 02/25/24  0355 02/24/24  0521 02/23/24  0300   INR   --   --   --   --  1.19*   APTT seconds  --   --   --   --  28.9   PLATELETS 10*3/mm3 128* 96* 75* 68* 81*     SARS-CoV-2, REYMUNDO   Date Value Ref Range Status   03/29/2023 NEGATIVE Negative Final     Comment:     The 2019-CoV rRT-PCR Assay is only for use under a Food and Drug Administration Emergency Use Authorization. The performance characteristics of the assay were verified by the Clinical Microbiology Laboratory at the [facility].   Results should be used in conjunction with the patient's clinical symptoms, medical history, and other clinical/laboratory findings to determine an overall clinical diagnosis. Negative results do not preclude infection with SARS-CoV-2 (COVID-19).    Test parameters have not been validated for screening asymptomatic patients.     Lab Results   Component Value Date    HGBA1C 5.50 02/23/2024          Medications           Scheduled Medications docusate sodium, 100 mg, Oral, Daily  folic acid 1 mg in sodium chloride 0.9 % 50 mL IVPB, 1 mg, Intravenous, Daily  levETIRAcetam, 500 mg, Intravenous, Q12H  metoprolol tartrate, 25 mg, Oral, Q12H  nicotine, 1 patch, Transdermal, Q24H  pantoprazole, 40 mg, Intravenous, Q AM  sodium chloride, 10 mL, Intravenous, Q12H  thiamine (B-1) IV, 200 mg, Intravenous, Q8H   Followed by  [START ON 2/28/2024] thiamine, 100 mg, Oral,  Daily       Infusions dexmedetomidine, 0.2-1.5 mcg/kg/hr, Last Rate: Stopped (02/26/24 1567)  dextrose 5 % and sodium chloride 0.9 %, 100 mL/hr, Last Rate: 100 mL/hr (02/27/24 8070)  niCARdipine, 5-15 mg/hr       PRN Medications   acetaminophen **OR** acetaminophen    aluminum-magnesium hydroxide-simethicone    bisacodyl    Calcium Replacement - Follow Nurse / BPA Driven Protocol    cloNIDine    influenza vaccine    loperamide    LORazepam **OR** LORazepam **OR** LORazepam **OR** LORazepam **OR** LORazepam **OR** LORazepam **OR** LORazepam **OR** LORazepam    Magnesium Standard Dose Replacement - Follow Nurse / BPA Driven Protocol    ondansetron    Phosphorus Replacement - Follow Nurse / BPA Driven Protocol    Potassium Replacement - Follow Nurse / BPA Driven Protocol    sodium chloride    sodium chloride     Physical Findings          General Findings alert, oriented   Oral/Mouth Cavity WDL   Edema  not assessed   Gastrointestinal abdominal distension, last bowel movement: 2/26   Skin  other: laceration to eyebrow   Tubes/Drains/Lines none   NFPE No clinical signs of muscle wasting or fat loss   --  Current Nutrition Orders & Evaluation of Intake       Oral Nutrition     Food Allergies NKFA   Current PO Diet Diet: Regular/House Diet; Texture: Regular Texture (IDDSI 7); Fluid Consistency: Thin (IDDSI 0)   Supplement n/a   PO Evaluation     % PO Intake %    Factors Affecting Intake: No factors at this time   --  PES STATEMENT / NUTRITION DIAGNOSIS      Nutrition Dx Problem  Problem: Predicted Suboptimal Intake  Etiology: Medical Diagnosis - Seizure, alcohol withdrawal, SDH, SAH, metabolic acidosis, thrombocytopenia    Signs/Symptoms: Report of Minimal PO Intake     NUTRITION INTERVENTION / PLAN OF CARE      Intervention Goal(s) Maintain nutrition status, Tolerate PO , Increase intake, Advance diet, and No significant weight loss         RD Intervention/Action Encourage intake, Continue to monitor, and Care plan  reviewed   --      Prescription/Orders:       PO Diet       Supplements       Enteral Nutrition       Parenteral Nutrition    New Prescription Ordered? Continue same per protocol, No changes at this time   --      Monitor/Evaluation Per protocol   Discharge Plan/Needs Pending clinical course   --    RD to follow per protocol.      Electronically signed by:  Xi Solis RD  02/27/24 11:09 EST   Yes

## 2024-02-27 NOTE — PLAN OF CARE
Goal Outcome Evaluation:  Plan of Care Reviewed With: patient        Progress: improving  Outcome Evaluation: Pt was agreeable to PT treatment this treatment session. Pt was A & O x 4, however still had moderate levels of confusion during session. Pt required supervison for sit>supine, scooting towards HOB, and during static sitting balance on EOB. Pt needed increased time for transfers. Pt required SBA for static/dynamic balance and during ambulation. Pt was able to ambulate around 300 feet with SBA. Pt needed mod VCs to increase step and stride amplitude, since pt presented with a shuffling gait pattern. Pt progressing well, PT will begin following peripherally. PT recommends home vs home with HHPT at this time. Pt was left supine in bed with call light in reach and bed alarm activated.      Anticipated Discharge Disposition (PT): home with assist, home with home health, skilled nursing facility

## 2024-02-27 NOTE — THERAPY DISCHARGE NOTE
[facility]  [phone]    ACCESS CENTER STATEMENT OF DISPOSITION        I, Nida Peck, was assessed in the [facility] at [facility] on 2/27/2024.  I understand the recommendations below and what follow-up action is expected of me.    Outpt Counseling Resources:    -[facility]  [address] [phone]    -[facility] [phone]    -[facility] [phone]                ________________________________  Patient/Parent/Guardian/POA Signature    ________________________________  Clinician Signature    2/27/2024  09:47 EST

## 2024-02-27 NOTE — PLAN OF CARE
Goal Outcome Evaluation:  Plan of Care Reviewed With: patient        Progress: improving  Outcome Evaluation: Pt is found in bed and is slightly aggitated with OT arrival and his conversation can be confusing at times but overall he is requesting to dc home. OT discussed importance of engagement in OT to assess current safety for dc home as he has been unsteady and a X2 person assist in past several sessions. Pt appears to have decreased insight into his deficits and overall reports he doesnt think hospitalization is necessary for his current condition despite pt having a SDH and being in ICU bed currently. However pt does show a great improvement in standing balance and is able to complete ADLs and functional mobility SV assistance today. He would benefit from SV at dc due to cogntive concerns. He plans to dc home with his brother. Pt declined further cogntive treatment with SLP this AM. OT to sign off per pt request and now that he has shown improvement and safe to dc home with SV.      Anticipated Discharge Disposition (OT): home with 24/7 care

## 2024-02-28 ENCOUNTER — READMISSION MANAGEMENT (OUTPATIENT)
Dept: CALL CENTER | Facility: HOSPITAL | Age: 29
End: 2024-02-28
Payer: COMMERCIAL

## 2024-02-28 VITALS
SYSTOLIC BLOOD PRESSURE: 128 MMHG | DIASTOLIC BLOOD PRESSURE: 84 MMHG | OXYGEN SATURATION: 100 % | BODY MASS INDEX: 19.61 KG/M2 | TEMPERATURE: 99 F | HEIGHT: 73 IN | WEIGHT: 147.93 LBS | RESPIRATION RATE: 16 BRPM | HEART RATE: 68 BPM

## 2024-02-28 PROBLEM — F10.939 ALCOHOL WITHDRAWAL SEIZURE: Status: ACTIVE | Noted: 2024-02-28

## 2024-02-28 PROBLEM — I60.9 SUBARACHNOID HEMORRHAGE: Status: ACTIVE | Noted: 2024-02-28

## 2024-02-28 PROBLEM — I10 HYPERTENSION: Status: ACTIVE | Noted: 2024-02-28

## 2024-02-28 PROBLEM — R56.9 ALCOHOL WITHDRAWAL SEIZURE: Status: ACTIVE | Noted: 2024-02-28

## 2024-02-28 PROBLEM — Z72.0 TOBACCO ABUSE: Status: ACTIVE | Noted: 2024-02-28

## 2024-02-28 PROBLEM — D64.9 ANEMIA: Status: ACTIVE | Noted: 2024-02-28

## 2024-02-28 PROBLEM — K70.9 ALCOHOLIC LIVER DISEASE: Status: ACTIVE | Noted: 2024-02-28

## 2024-02-28 PROBLEM — D69.6 THROMBOCYTOPENIA: Status: ACTIVE | Noted: 2024-02-28

## 2024-02-28 LAB
ALBUMIN SERPL-MCNC: 3.5 G/DL (ref 3.5–5.2)
ANION GAP SERPL CALCULATED.3IONS-SCNC: 13.1 MMOL/L (ref 5–15)
BUN SERPL-MCNC: 5 MG/DL (ref 6–20)
BUN/CREAT SERPL: 6.2 (ref 7–25)
CALCIUM SPEC-SCNC: 6.7 MG/DL (ref 8.6–10.5)
CHLORIDE SERPL-SCNC: 104 MMOL/L (ref 98–107)
CO2 SERPL-SCNC: 21.9 MMOL/L (ref 22–29)
CREAT SERPL-MCNC: 0.81 MG/DL (ref 0.76–1.27)
EGFRCR SERPLBLD CKD-EPI 2021: 120.9 ML/MIN/1.73
GLUCOSE SERPL-MCNC: 127 MG/DL (ref 65–99)
PHOSPHATE SERPL-MCNC: 3 MG/DL (ref 2.5–4.5)
POTASSIUM SERPL-SCNC: 3.3 MMOL/L (ref 3.5–5.2)
SODIUM SERPL-SCNC: 139 MMOL/L (ref 136–145)

## 2024-02-28 PROCEDURE — 80069 RENAL FUNCTION PANEL: CPT | Performed by: INTERNAL MEDICINE

## 2024-02-28 RX ORDER — ACETAMINOPHEN 325 MG/1
650 TABLET ORAL EVERY 4 HOURS PRN
Qty: 120 TABLET | Refills: 0 | Status: SHIPPED | OUTPATIENT
Start: 2024-02-28

## 2024-02-28 RX ORDER — LANOLIN ALCOHOL/MO/W.PET/CERES
100 CREAM (GRAM) TOPICAL DAILY
Qty: 30 TABLET | Refills: 3 | Status: SHIPPED | OUTPATIENT
Start: 2024-02-29

## 2024-02-28 RX ORDER — FOLIC ACID 1 MG/1
1 TABLET ORAL DAILY
Qty: 30 TABLET | Refills: 3 | Status: SHIPPED | OUTPATIENT
Start: 2024-02-29

## 2024-02-28 RX ORDER — LEVETIRACETAM 500 MG/1
500 TABLET ORAL EVERY 12 HOURS SCHEDULED
Qty: 60 TABLET | Refills: 3 | Status: SHIPPED | OUTPATIENT
Start: 2024-02-28

## 2024-02-28 RX ORDER — NICOTINE 21 MG/24HR
1 PATCH, TRANSDERMAL 24 HOURS TRANSDERMAL
Qty: 30 PATCH | Refills: 0 | Status: SHIPPED | OUTPATIENT
Start: 2024-02-29

## 2024-02-28 RX ORDER — DIPHENOXYLATE HYDROCHLORIDE AND ATROPINE SULFATE 2.5; .025 MG/1; MG/1
1 TABLET ORAL DAILY
Qty: 30 TABLET | Refills: 11 | Status: SHIPPED | OUTPATIENT
Start: 2024-02-28 | End: 2025-02-27

## 2024-02-28 RX ADMIN — Medication 10 ML: at 08:30

## 2024-02-28 RX ADMIN — NICOTINE 1 PATCH: 21 PATCH, EXTENDED RELEASE TRANSDERMAL at 08:33

## 2024-02-28 RX ADMIN — METOPROLOL TARTRATE 25 MG: 25 TABLET, FILM COATED ORAL at 08:29

## 2024-02-28 RX ADMIN — Medication 100 MG: at 08:30

## 2024-02-28 RX ADMIN — LEVETIRACETAM 500 MG: 500 TABLET, FILM COATED ORAL at 08:28

## 2024-02-28 RX ADMIN — FOLIC ACID 1 MG: 1 TABLET ORAL at 08:29

## 2024-02-28 NOTE — NURSING NOTE
Spoke with patient about family members calling and asking for information about patients condition. Pt was very clear that he no longer wants information shared by staff with anyone but him. I let pt know that I would update chart to reflect his wishes.

## 2024-02-28 NOTE — PLAN OF CARE
Goal Outcome Evaluation:            Pt. Verbalizes that he would like discharge papers and that prior RN stated that he would get to leave early. Explained to patient that the Doctor does not round this early and we do not have definite discharge for the day. Pt. Is anxious to get to his brothers house to stay. Otherwise, complaints of mild headache, no other symptoms noted.

## 2024-02-28 NOTE — PAYOR COMM NOTE
"Nida Peck (29 y.o. Male)        PLEASE SEE ATTACHED FOR DC SUMMARY    REF#[id number]     THANK YOU    HANSEL SIMON LPN CCP   Date of Birth   1995    Social Security Number       Address   [address] [facility] [address]    Home Phone   [phone]    MRN   [id number]       Episcopal   Mandaeism    Marital Status   Single                            Admission Date   24    Admission Type   Emergency    Admitting Provider   Agus Baltazar MD    Attending Provider       Department, Room/Bed   [facility] [address] [place], P584/1       Discharge Date   2024    Discharge Disposition   Home-Health Care Sv    Discharge Destination                                 Attending Provider: (none)   Allergies: Penicillins, Sulfa Antibiotics    Isolation: None   Infection: None   Code Status: CPR    Ht: 185.4 cm (73\")   Wt: 67.1 kg (147 lb 14.9 oz)    Admission Cmt: None   Principal Problem: Subdural hematoma [S06.5XAA]                   Active Insurance as of 2024       Primary Coverage       Payor Plan Insurance Group Employer/Plan Group    [facility] [employer]        Payor Plan Address Payor Plan Phone Number Payor Plan Fax Number Effective Dates    [address] [address]   2024 - None Entered    [facility] [address]         Subscriber Name Subscriber Birth Date Member ID       NIDA PECK 1995 [id number]                     Emergency Contacts        (Rel.) Home Phone Work Phone Mobile Phone    CHINMAY BRASHER (Friend) -- -- [phone]    WagonerMargoth claros (Mother) -- -- [phone]                 Discharge Summary        Libra Jasso MD at 24 1018              Patient Name: Nida Peck  : 1995  MRN: [id number]    Date of Admission: 2024  Date of Discharge:  2024  Primary Care Physician: Provider, No Known      Discharge Diagnoses     Active Hospital Problems    Diagnosis  POA    **Subdural hematoma [S06.5XAA]  Yes "    Subarachnoid hemorrhage [I60.9]  Yes    Alcohol withdrawal seizure [F10.939, R56.9]  Yes    Hypertension [I10]  Yes    Alcoholic liver disease [K70.9]  Yes    Tobacco abuse [Z72.0]  Yes    Anemia [D64.9]  Yes    Thrombocytopenia [D69.6]  Yes      Resolved Hospital Problems   No resolved problems to display.        Hospital Course     Brief admission history and physical.  Is refer to the H&P for full details.  A 29 years old gentleman with a past history of alcohol abuse and withdrawal/malnutrition/iron deficiency anemia/alcoholic liver disease/thrombocytopenia/tobacco abuse/mood disorder/GERD/esophagitis presents to the emergency department for seizure like activity at work positive nausea and diarrhea x 2 days.  Physical examination on admission included a temperature of 99.2, blood pressure of 126/95 a pulse of 142 respiratory rate of 18.  Rest of the examination is remarkable for right orbital hematoma with sutured wound associated with swelling/tachycardia.  Hospital course.  Initial evaluation included a CBC that was normal except a hemoglobin of 12.7 and platelets of 96.  Magnesium was low at 1.2.  Ethanol level was negative.  CMP normal except a random blood sugar of 125, potassium 3.2, chloride 90, CO2 of 14.9, calcium 8.5, ALT 78, , alkaline phosphatase of 244, anion gap 32.  CK was normal.  Phosphorus was low at 1.5.  UA with too numerous to count white blood cells.  Urine tox screen was negative.  Ammonia was normal.  Beta hydroxy was elevated at 2.6.  Chest x-ray was negative.  CT scan of the brain revealed a left cerebellar subdural hematoma associated with mid line shift, trace subarachnoid hemorrhage with left tentorial hemorrhage.  Patient was admitted to the intensive care unit.  Neurosurgery and neurology consult was obtained.  Patient was started on IV Keppra.  Serial CT scans of the brain were followed up by neurosurgery and showed stability.  CNS examination remained stable.  EEG  revealed no epileptiform focus, moderate encephalopathy.  Neurology recommended that the patient remains on Keppra.  Patient was counseled about seizure safety including nonwork at heights and no driving and no bath tubs but only showers.  He was released from neurology and neurosurgery to follow-up with them as an outpatient.  He was counseled against nonsteroidal anti-inflammatory medication.  It was thought that the seizure is an alcohol withdrawal seizure and the patient was placed on CIWA protocol and detoxification protocol.  Psychiatry service has seen the patient and the patient is willing to participate in outpatient detoxification program and he was provided with these service numbers by psychiatry.  Alcohol withdrawal has resolved by the time of discharge.  He was asked to continue with thiamine/folic acid/multivitamin.  His folic acid level was low during this admission.  His blood pressure was noted to be high during this admission there was a question jono about rapid A-fib however rapid A-fib was not documented and blood pressure improved with beta-blockers.  His alcoholic ketoacidosis and electrolyte imbalance resolved with IV fluid and substitution of the electrolytes.  He was noted to have alcoholic hepatitis and hepatic Doppler revealed no blood clots hepatitis panel was negative.  CT scan of the abdomen pelvis revealed severe fatty liver.  his diet was advanced and by the time of discharge he was tolerating diet well.  He is to follow-up with GI as an outpatient.  Liver function test is improving at the time of discharge.  He also was noted to have anemia and folic acid was low and this was supplemented and hemoglobin remained stable.  He was noted to have thrombocytopenia because of bone marrow suppression because of alcohol that has improved during this admission with no active bleed.  At the time of discharge he was hemodynamically stable.  He is to follow-up with  neurology/neurosurgery/primary MD.  Physical therapy evaluated the patient and they will follow-up with the patient at home for continuation of physical therapy.    Consultants     Consult Orders (all) (From admission, onward)       Start     Ordered    02/27/24 0844  Inpatient Hospitalist Consult  Once        Specialty:  Hospitalist  Provider:  Libra Jasso MD    02/27/24 0843    02/25/24 0943  Inpatient Access Center Consult  Once        Provider:  (Not yet assigned)    02/25/24 0949    02/23/24 0732  Inpatient Nutrition Consult  Once        Provider:  (Not yet assigned)    02/23/24 0731    02/23/24 0702  Inpatient Gastroenterology Consult  IN AM,   Status:  Canceled        Specialty:  Gastroenterology  Provider:  Abisai Awan MD    02/23/24 0422    02/23/24 0702  Inpatient Neurology Consult General  IN AM        Specialty:  Neurology  Provider:  Wilbur Sheth MD    02/23/24 0456    02/23/24 0405  Inpatient Neurology Consult General  Once,   Status:  Canceled        Specialty:  Neurology  Provider:  Wilbur Sheth MD    02/23/24 0407    02/23/24 0121  Notify Stroke Coordinator  Once        Provider:  (Not yet assigned)    02/23/24 0122    02/23/24 0121  Inpatient Rehab Admission Consult  Once        Provider:  (Not yet assigned)    02/23/24 0122    02/23/24 0121  Inpatient Case Management  Consult  Once        Provider:  (Not yet assigned)    02/23/24 0122    02/23/24 0121  Inpatient Diabetes Educator Consult  Once,   Status:  Canceled        Provider:  (Not yet assigned)    02/23/24 0122    02/23/24 0121  Inpatient Neurosurgery Consult  Once        Specialty:  Neurosurgery  Provider:  (Not yet assigned)    02/23/24 0122    02/23/24 0057  Pulmonology (on-call MD unless specified)  Once,   Status:  Canceled        Specialty:  Pulmonary Disease  Provider:  (Not yet assigned)    02/23/24 0056    02/23/24 0054  Neurosurgery (on-call MD unless specified)  Once         Specialty:  Neurosurgery  Provider:  Je Celeste MD    02/23/24 0053                  Procedures     Imaging Results (All)       Procedure Component Value Units Date/Time    CT Head Without Contrast [[id number]] Collected: 02/25/24 0344     Updated: 02/25/24 0344    Narrative:        Patient: JOSÉ HORTON  Time Out: 03:43  Exam(s): CT HEAD Without Contrast     EXAM:    CT Head Without Intravenous Contrast    CLINICAL HISTORY:       Follow-up.    TECHNIQUE:    Axial computed tomography images of the head brain without intravenous   contrast.  CTDI is 54.42 mGy and DLP is 1020.9 mGy-cm.  This CT exam was   performed according to the principle of ALARA (As Low As Reasonably   Achievable) by using one or more of the following dose reduction   techniques: automated exposure control, adjustment of the mA and or kV   according to patient size, and or use of iterative reconstruction   technique.    COMPARISON:    CT head 2 24 2024    FINDINGS:    Brain:  Stable left subdural hematoma in minimal subarachnoid   hemorrhage.  No mass-effect or midline shift.  No significant white   matter disease.    Ventricles:  Unremarkable.  No ventriculomegaly.    Bones joints:  Unremarkable.  No acute fracture.    Soft tissues: Right periorbital soft tissue hematoma.    Sinuses:  Unremarkable as visualized.  No acute sinusitis.    Mastoid air cells:  Unremarkable as visualized.  No mastoid effusion.    IMPRESSION:       1.  Stable left subdural hematoma and minimal subarachnoid hemorrhage.  2.  No new finding.      Impression:          Electronically signed by Faith Vance MD on 02-25-24 at 0343    CT Head Without Contrast [[id number]] Collected: 02/24/24 0631     Updated: 02/24/24 0631    Narrative:        Patient: JOSÉ HORTON  Time Out: 06:30  Exam(s): CT HEAD Without Contrast     EXAM:    CT Head Without Intravenous Contrast    CLINICAL HISTORY:     Reason for exam: Follow-up traumatic subdural hematoma.    TECHNIQUE:    Axial  computed tomography images of the head brain without intravenous   contrast.  CTDI is 54.92 mGy and DLP is 1037.7 mGy-cm.  This CT exam was   performed according to the principle of ALARA (As Low As Reasonably   Achievable) by using one or more of the following dose reduction   techniques: automated exposure control, adjustment of the mA and or kV   according to patient size, and or use of iterative reconstruction   technique.    COMPARISON:    Noncontrast head CT 02 23 2024 0847 hrs.    FINDINGS:    Brain:  Stable acute subdural hematoma over the left cerebral   hemisphere measuring up to 5 mm is present with interval improvement in   superimposed acute subarachnoid hemorrhage best appreciated in the left   frontal sulci between the superior and middle frontal gyri.  No   significant white matter disease.    Ventricles:  Unremarkable.  No ventriculomegaly.    Bones joints:  Unremarkable.  No acute fracture.    Soft tissues:  Unremarkable.    Sinuses:  Unremarkable as visualized.  No acute sinusitis.    Mastoid air cells:  Unremarkable as visualized.  No mastoid effusion.    IMPRESSION:         Stable acute subdural hematoma over the left cerebral hemisphere   measuring up to 5 mm is present with interval improvement in superimposed   acute subarachnoid hemorrhage best appreciated in the left frontal sulci   between the superior and middle frontal gyri.        Impression:          Electronically signed by Steven Zhao MD on 02-24-24 at 0630    CT Head Without Contrast [[id number]] Collected: 02/23/24 1002     Updated: 02/23/24 1652    Narrative:      CT HEAD WITHOUT CONTRAST     HISTORY: Intracranial hemorrhage, follow-up.     COMPARISON: CT head 02/23/2024.     FINDINGS: A septum cavum pellucidum is noted. A small to moderate-sized  supraorbital hematoma is appreciated on the right similar in appearance  as compared to the prior examination. A subdural hematoma is appreciated  overlying the left cerebral  hemisphere anteriorly, laterally and  superolaterally which has increased in attenuation as compared to  02/23/2024. It measures approximately 6 mm in thickness as measured  perpendicular to the inner table on the coronal reconstructions.  Subarachnoid blood is appreciated overlying the anterior and superior  aspect of the left frontal lobe, unchanged. There was no evidence of  midline shift, acute infarction or of intra-axial hemorrhage.       Impression:      The pre-existing subdural hematoma overlying the left  cerebral hemisphere is unchanged in size measuring approximately 6 mm in  thickness. It has increased in attenuation however. Subarachnoid blood  overlying the left frontal lobe is noted, present previously and  unchanged. Continued surveillance is recommended.     Radiation dose reduction techniques were utilized, including automated  exposure control and exposure modulation based on body size.        This report was finalized on 2/23/2024 4:49 PM by Dr. Cj Cole M.D  on Workstation: [id number]       CT Abdomen Pelvis Without Contrast [[id number]] Collected: 02/23/24 1144     Updated: 02/23/24 1230    Narrative:      CT SCAN OF THE ABDOMEN AND PELVIS WITHOUT CONTRAST     HISTORY: Subarachnoid hemorrhage. Nausea and vomiting.     FINDINGS: The CT scan was performed through the abdomen and pelvis  without contrast. The following findings are present:  1. The lung bases are clear. There is extensive low density throughout  the liver consistent with severe hepatic steatosis. No focal liver  lesions are seen. The spleen is not enlarged. The gallbladder shows no  gallstones or wall thickening.  2. There is some vague increased density and suggestion of slight  haziness in the region of the head of pancreas and suspicious for  changes of mild acute pancreatitis and further clinical correlation is  recommended.  3. The adrenal glands and both kidneys are unremarkable. There is no  aortic aneurysm or  retroperitoneal lymphadenopathy. The large and small  bowel loops are normal in caliber and show no inflammatory change. In  the pelvis, the urinary bladder has a smooth contour.        Radiation dose reduction techniques were utilized, including automated  exposure control and exposure modulation based on body size.        This report was finalized on 2/23/2024 12:27 PM by Dr. Sg Price M.D on Workstation: [id number]       CT Head Without Contrast [[id number]] Collected: 02/23/24 0044     Updated: 02/23/24 0058    Narrative:      CT OF THE HEAD WITHOUT CONTRAST; CT OF THE CERVICAL SPINE     HISTORY: Seizure     COMPARISON: February 22, 2024     TECHNIQUE: Axial CT imaging was obtained through the brain. No IV  contrast was administered. Axial CT imaging was obtained through the  cervical spine. Coronal and sagittal reformatted images were obtained.     FINDINGS:  CT OF THE HEAD: The patient has a mixed density (although predominantly  hyperdense) subdural hematoma overlying the left cerebral convexity.  Maximum thickness is 1.1 cm. It was not visible on the earlier study.  There is midline shift, measuring up to 6 mm. There is trace left  parafalcine subdural hemorrhage, measuring up to 2 mm, likely also a  trace amount overlying the left tentorium. The patient also appears to  have a small amount of subarachnoid hemorrhage. No calvarial fracture is  seen. Mucosal thickening is noted within the ethmoid sinuses. There is a  right supraorbital scalp hematoma/laceration. Right globe is intact.  There is no post septal extension.     CT OF THE CERVICAL SPINE: No acute fracture or subluxation of the  cervical spine is seen. Cervical vertebral body alignment appears within  normal limits. There is no prevertebral soft tissue swelling. Images  through the lung apices demonstrate emphysematous changes, which is an  advanced finding for the age of 29. Thyroid gland and trachea are within  normal limits.       Impression:          1. Left cerebral convexity subdural hematoma with a maximum thickness of  1.1 cm. Midline shift of 6 mm. There is also trace subarachnoid  hemorrhage, as well as trace left parafalcine and left tentorial  hemorrhage.  2. No acute fracture or subluxation of the cervical spine is seen.     FINDINGS were discussed with Dr. Mckeon at 12:52 a.m.           Radiation dose reduction techniques were utilized, including automated  exposure control and exposure modulation based on body size.        This report was finalized on 2/23/2024 12:55 AM by Dr. Alexa Ferrer M.D on Workstation: [id number]       CT Cervical Spine Without Contrast [[id number]] Collected: 02/23/24 0044     Updated: 02/23/24 0058    Narrative:      CT OF THE HEAD WITHOUT CONTRAST; CT OF THE CERVICAL SPINE     HISTORY: Seizure     COMPARISON: February 22, 2024     TECHNIQUE: Axial CT imaging was obtained through the brain. No IV  contrast was administered. Axial CT imaging was obtained through the  cervical spine. Coronal and sagittal reformatted images were obtained.     FINDINGS:  CT OF THE HEAD: The patient has a mixed density (although predominantly  hyperdense) subdural hematoma overlying the left cerebral convexity.  Maximum thickness is 1.1 cm. It was not visible on the earlier study.  There is midline shift, measuring up to 6 mm. There is trace left  parafalcine subdural hemorrhage, measuring up to 2 mm, likely also a  trace amount overlying the left tentorium. The patient also appears to  have a small amount of subarachnoid hemorrhage. No calvarial fracture is  seen. Mucosal thickening is noted within the ethmoid sinuses. There is a  right supraorbital scalp hematoma/laceration. Right globe is intact.  There is no post septal extension.     CT OF THE CERVICAL SPINE: No acute fracture or subluxation of the  cervical spine is seen. Cervical vertebral body alignment appears within  normal limits. There is no prevertebral soft tissue  swelling. Images  through the lung apices demonstrate emphysematous changes, which is an  advanced finding for the age of 29. Thyroid gland and trachea are within  normal limits.       Impression:         1. Left cerebral convexity subdural hematoma with a maximum thickness of  1.1 cm. Midline shift of 6 mm. There is also trace subarachnoid  hemorrhage, as well as trace left parafalcine and left tentorial  hemorrhage.  2. No acute fracture or subluxation of the cervical spine is seen.     FINDINGS were discussed with Dr. Mckeon at 12:52 a.m.           Radiation dose reduction techniques were utilized, including automated  exposure control and exposure modulation based on body size.        This report was finalized on 2/23/2024 12:55 AM by Dr. Alexa Ferrer M.D on Workstation: [id number]       XR Chest 1 View [[id number]] Collected: 02/22/24 2044     Updated: 02/22/24 2048    Narrative:      XR CHEST 1 VW-     HISTORY: 29-year-old male with seizure.     FINDINGS: There is no evidence for pneumonia, effusions, CHF, or  pneumothorax.     This report was finalized on 2/22/2024 8:45 PM by Dr. Kimmy Carmona M.D on  Workstation: [id number]       CT Head Without Contrast [[id number]] Collected: 02/22/24 2036     Updated: 02/22/24 2044    Narrative:      CT HEAD WO CONTRAST-     INDICATIONS: Seizure     TECHNIQUE: Radiation dose reduction techniques were utilized, including  automated exposure control and exposure modulation based on body size.  Noncontrast head CT     COMPARISON: None available     FINDINGS:           No acute intracranial hemorrhage, midline shift or mass effect. No acute  territorial infarct is identified.        Cavum of septum pellucidum is noted measuring 1.2 cm transversely,  compatible with cyst of the septum pellucidum. Ventricles, cisterns,  cerebral sulci are otherwise unremarkable for patient age.     The visualized paranasal sinuses, orbits, mastoid air cells are  unremarkable.           Impression:         No acute intracranial hemorrhage or hydrocephalus. Cyst of the septum  pellucidum.      For further evaluation of the cause of patient's seizure, if indicated,  enhanced MRI is advised.     This report was finalized on 2/22/2024 8:41 PM by Dr. Albert Moran M.D on Workstation: [id number]               Pertinent Labs     Results from last 7 days   Lab Units 02/27/24 0330 02/26/24 0445 02/25/24 0355 02/24/24  0521   WBC 10*3/mm3 6.14 5.31 4.83 5.32   HEMOGLOBIN g/dL 10.8* 10.5* 11.8* 10.0*   PLATELETS 10*3/mm3 128* 96* 75* 68*     Results from last 7 days   Lab Units 02/27/24 0330 02/26/24 0445 02/25/24  1451 02/25/24 0355 02/24/24  0521   SODIUM mmol/L 139 138  --  140 133*   POTASSIUM mmol/L 3.8 4.1 3.8 3.6 3.7   CHLORIDE mmol/L 106 109*  --  105 100   CO2 mmol/L 22.0 17.5*  --  23.0 16.5*   BUN mg/dL 5* 4*  --  <2* 2*   CREATININE mg/dL 0.88 0.67*  --  0.86 0.87   GLUCOSE mg/dL 91 179*  --  122* 104*   Estimated Creatinine Clearance: 115.4 mL/min (by C-G formula based on SCr of 0.88 mg/dL).  Results from last 7 days   Lab Units 02/27/24 0330 02/26/24 0445 02/25/24 0355 02/24/24  0521   ALBUMIN g/dL 3.5 3.3* 3.8 3.6   BILIRUBIN mg/dL 0.6 0.6 0.9 1.1   ALK PHOS U/L 220* 181* 194* 199*   AST (SGOT) U/L 138* 158* 156* 284*   ALT (SGPT) U/L 59* 57* 65* 71*     Results from last 7 days   Lab Units 02/27/24 0330 02/26/24 0445 02/25/24 0355 02/24/24  0521 02/23/24 0300 02/22/24 2217 02/22/24 2013   CALCIUM mg/dL 8.0* 6.9* 7.6* 7.5*   < > 7.9* 8.5*   ALBUMIN g/dL 3.5 3.3* 3.8 3.6   < >  --  4.7   MAGNESIUM mg/dL  --   --   --   --   --  1.8 1.2*   PHOSPHORUS mg/dL 2.3* 2.7 2.8 1.7*   < > 1.5*  --     < > = values in this interval not displayed.     Results from last 7 days   Lab Units 02/23/24  0300   AMMONIA umol/L 50     Results from last 7 days   Lab Units 02/23/24  0511 02/22/24 2217   CK TOTAL U/L 236* 163       Results from last 7 days   Lab Units 02/23/24  0300   CHOLESTEROL  mg/dL 161   TRIGLYCERIDES mg/dL 88   HDL CHOL mg/dL 78*   LDL CHOL mg/dL 67         Imaging Results (Last 24 Hours)       ** No results found for the last 24 hours. **            Test Results Pending at Discharge     Pending Labs       Order Current Status    Renal Function Panel In process              Discharge Exam   Physical Exam  Vitals.  Temperature 99 a pulse of 68 respiratory rate of 16 blood pressure 128/84 and O2 sats of 100% on room air  General.  Middle-aged gentleman.  He is alert and oriented x 4.  In no apparent pain/distress/diaphoresis.  Normal mood and affect.  Eyes.  Pupils equal round and reactive.  Hematoma over the right orbit with sutures in place above the right eyebrow.  No pallor or jaundice and intact extraocular musculature  Oral cavity.  Moist mucous membrane.  Neck.  Supple.  No JVD.  No lymphadenopathy or thyromegaly.  Cardiovascular.  Regular rate and rhythm.  Bradycardia.  No murmurs.  Chest.  Clear to auscultation bilaterally with no added sounds.  Abdomen.  Soft lax.  No tenderness.  No organomegaly.  No guarding or rebound.  Extremities.  No clubbing/cyanosis/edema.  NS.  No acute focal neurological deficits    Discharge Details        Discharge Medications        New Medications        Instructions Start Date   acetaminophen 325 MG tablet  Commonly known as: TYLENOL   650 mg, Oral, Every 4 Hours PRN      folic acid 1 MG tablet  Commonly known as: FOLVITE   1 mg, Oral, Daily   Start Date: February 29, 2024     levETIRAcetam 500 MG tablet  Commonly known as: KEPPRA   500 mg, Oral, Every 12 Hours Scheduled      metoprolol tartrate 25 MG tablet  Commonly known as: LOPRESSOR   25 mg, Oral, Every 12 Hours Scheduled      multivitamin capsule   1 capsule, Oral, Daily      nicotine 21 MG/24HR patch  Commonly known as: NICODERM CQ   1 patch, Transdermal, Every 24 Hours Scheduled   Start Date: February 29, 2024     thiamine 100 MG tablet  Commonly known as: VITAMIN B1   100 mg, Oral,  Daily   Start Date: February 29, 2024              Allergies   Allergen Reactions    Penicillins Unknown - Low Severity    Sulfa Antibiotics Unknown - Low Severity         Discharge Disposition:  Condition: Stable    Diet:   Diet Order   Procedures    Diet: Regular/House Diet; Texture: Regular Texture (IDDSI 7); Fluid Consistency: Thin (IDDSI 0)       Activity:   Activity Instructions       Activity as Tolerated      Driving Restrictions      Type of Restriction: Driving    Driving Restrictions: No Driving Until Next Appointment    Other Activity Instructions      Activity Instructions: Home health PT to evaluate and treat    Up WIth Assist              Counseling : Abstain from smoking/alcohol.    CODE STATUS:    Code Status and Medical Interventions:   Ordered at: 02/23/24 0123     Code Status (Patient has no pulse and is not breathing):    CPR (Attempt to Resuscitate)     Medical Interventions (Patient has pulse or is breathing):    Full Support       Future Appointments   Date Time Provider Department Center   3/11/2024  4:30 PM [facility]  [facility] [place]   3/14/2024 11:45 AM Je Celeste MD [facility]   4/23/2024 11:00 AM Anthony Tatum MD [facility]     Additional Instructions for the Follow-ups that You Need to Schedule       Ambulatory Referral to Home Health   As directed      Face to Face Visit Date: 2/28/2024   Follow-up provider for Plan of Care?: I treated the patient in an acute care facility and will not continue treatment after discharge.   Follow-up provider: NO KNOWN PROVIDER [[id number]]   Reason/Clinical Findings: Seizures/head trauma/intracranial bleed   Describe mobility limitations that make leaving home difficult: As above   Nursing/Therapeutic Services Requested: Physical Therapy Skilled Nursing   Skilled nursing orders: Neurovascular assessments   PT orders: Therapeutic exercise Gait Training Transfer training Strengthening   Weight Bearing Status: As Tolerated   Frequency:  1 Week 1        Call MD With Problems / Concerns   As directed      Instructions: Call MD or return to ER if worsening headache/loss of consciousness/dizziness/gait abnormality/focal neurological symptoms/chest pain or shortness of breath    Order Comments: Instructions: Call MD or return to ER if worsening headache/loss of consciousness/dizziness/gait abnormality/focal neurological symptoms/chest pain or shortness of breath         Discharge Follow-up with PCP   As directed       Currently Documented PCP:    Provider, No Known    PCP Phone Number:    [phone]     Follow Up Details: Primary MD.  1 week.  Alcohol withdrawal seizure/subdural/subarachnoid traumatic hemorrhage/head trauma/alcoholic liver disease/hypertension/electrolyte imbalance/tobacco and alcohol use/anemia and thrombocytopenia        Discharge Follow-up with Specified Provider: GI.; 1 Month   As directed      To: GI.   Follow Up: 1 Month   Follow Up Details: Alcoholic liver disease.        Discharge Follow-up with Specified Provider: Neurology; 1 Month   As directed      To: Neurology   Follow Up: 1 Month   Follow Up Details: Alcohol withdrawal seizures and subarachnoid, subdural hemorrhage        Discharge Follow-up with Specified Provider: Neurosurgery; 2 Weeks   As directed      To: Neurosurgery   Follow Up: 2 Weeks   Follow Up Details: Alcohol withdrawal seizure and head trauma leading to subdural and subarachnoid hemorrhage               Follow-up Information       Je Celeste MD. Schedule an appointment as soon as possible for a visit in 2 week(s).    Specialty: Neurosurgery  Why: Follow up head CT  Contact information:  [address] [address]  [phone]               Provider, No Known .    Why: Primary MD.  1 week.  Alcohol withdrawal seizure/subdural/subarachnoid traumatic hemorrhage/head trauma/alcoholic liver disease/hypertension/electrolyte imbalance/tobacco and alcohol use/anemia and  thrombocytopenia  Contact information:  [place] [address]  [phone]                               Time Spent on Discharge:  Greater than 30 minutes      Meseret Jasso MD  [facility]  02/28/24  10:18 EST     Electronically signed by Meseret Jasso MD at 02/28/24 1031       Discharge Order (From admission, onward)       Start     Ordered    02/28/24 1011  Discharge patient  Once        Expected Discharge Date: 02/28/24   Expected Discharge Time: Morning   Discharge Disposition: Home-Health [facility] [place] [facility]   Physician of Record for Attribution - Please select from Treatment Team: MESERET JASSO [[id number]]   Review needed by CMO to determine Physician of Record: No      Question Answer Comment   Physician of Record for Attribution - Please select from Treatment Team MESERET JASSO    Review needed by CMO to determine Physician of Record No        02/28/24 8822

## 2024-02-28 NOTE — DISCHARGE SUMMARY
Patient Name: Nida Peck  : 1995  MRN: [id number]    Date of Admission: 2024  Date of Discharge:  2024  Primary Care Physician: Provider, No Known      Discharge Diagnoses     Active Hospital Problems    Diagnosis  POA    **Subdural hematoma [S06.5XAA]  Yes    Subarachnoid hemorrhage [I60.9]  Yes    Alcohol withdrawal seizure [F10.939, R56.9]  Yes    Hypertension [I10]  Yes    Alcoholic liver disease [K70.9]  Yes    Tobacco abuse [Z72.0]  Yes    Anemia [D64.9]  Yes    Thrombocytopenia [D69.6]  Yes      Resolved Hospital Problems   No resolved problems to display.        Hospital Course     Brief admission history and physical.  Is refer to the H&P for full details.  A 29 years old gentleman with a past history of alcohol abuse and withdrawal/malnutrition/iron deficiency anemia/alcoholic liver disease/thrombocytopenia/tobacco abuse/mood disorder/GERD/esophagitis presents to the emergency department for seizure like activity at work positive nausea and diarrhea x 2 days.  Physical examination on admission included a temperature of 99.2, blood pressure of 126/95 a pulse of 142 respiratory rate of 18.  Rest of the examination is remarkable for right orbital hematoma with sutured wound associated with swelling/tachycardia.  Hospital course.  Initial evaluation included a CBC that was normal except a hemoglobin of 12.7 and platelets of 96.  Magnesium was low at 1.2.  Ethanol level was negative.  CMP normal except a random blood sugar of 125, potassium 3.2, chloride 90, CO2 of 14.9, calcium 8.5, ALT 78, , alkaline phosphatase of 244, anion gap 32.  CK was normal.  Phosphorus was low at 1.5.  UA with too numerous to count white blood cells.  Urine tox screen was negative.  Ammonia was normal.  Beta hydroxy was elevated at 2.6.  Chest x-ray was negative.  CT scan of the brain revealed a left cerebellar subdural hematoma associated with mid line shift, trace subarachnoid hemorrhage with left  tentorial hemorrhage.  Patient was admitted to the intensive care unit.  Neurosurgery and neurology consult was obtained.  Patient was started on IV Keppra.  Serial CT scans of the brain were followed up by neurosurgery and showed stability.  CNS examination remained stable.  EEG revealed no epileptiform focus, moderate encephalopathy.  Neurology recommended that the patient remains on Keppra.  Patient was counseled about seizure safety including nonwork at [employer] and no driving and no bath tubs but only showers.  He was released from neurology and neurosurgery to follow-up with them as an outpatient.  He was counseled against nonsteroidal anti-inflammatory medication.  It was thought that the seizure is an alcohol withdrawal seizure and the patient was placed on CIWA protocol and detoxification protocol.  Psychiatry service has seen the patient and the patient is willing to participate in outpatient detoxification program and he was provided with these service numbers by psychiatry.  Alcohol withdrawal has resolved by the time of discharge.  He was asked to continue with thiamine/folic acid/multivitamin.  His folic acid level was low during this admission.  His blood pressure was noted to be high during this admission there was a question jono about rapid A-fib however rapid A-fib was not documented and blood pressure improved with beta-blockers.  His alcoholic ketoacidosis and electrolyte imbalance resolved with IV fluid and substitution of the electrolytes.  He was noted to have alcoholic hepatitis and hepatic Doppler revealed no blood clots hepatitis panel was negative.  CT scan of the abdomen pelvis revealed severe fatty liver.  his diet was advanced and by the time of discharge he was tolerating diet well.  He is to follow-up with GI as an outpatient.  Liver function test is improving at the time of discharge.  He also was noted to have anemia and folic acid was low and this was supplemented and hemoglobin  remained stable.  He was noted to have thrombocytopenia because of bone marrow suppression because of alcohol that has improved during this admission with no active bleed.  At the time of discharge he was hemodynamically stable.  He is to follow-up with neurology/neurosurgery/primary MD.  Physical therapy evaluated the patient and they will follow-up with the patient at home for continuation of physical therapy.    Consultants     Consult Orders (all) (From admission, onward)       Start     Ordered    02/27/24 0844  Inpatient Hospitalist Consult  Once        Specialty:  Hospitalist  Provider:  Libra Jasso MD    02/27/24 0843    02/25/24 0943  Inpatient Access Center Consult  Once        Provider:  (Not yet assigned)    02/25/24 0949    02/23/24 0732  Inpatient Nutrition Consult  Once        Provider:  (Not yet assigned)    02/23/24 0731    02/23/24 0702  Inpatient Gastroenterology Consult  IN AM,   Status:  Canceled        Specialty:  Gastroenterology  Provider:  Abisai Awan MD    02/23/24 0422    02/23/24 0702  Inpatient Neurology Consult General  IN         Specialty:  Neurology  Provider:  Wilbur Sheth MD    02/23/24 0456    02/23/24 0405  Inpatient Neurology Consult General  Once,   Status:  Canceled        Specialty:  Neurology  Provider:  Wilbur Sheth MD    02/23/24 0407    02/23/24 0121  Notify Stroke Coordinator  Once        Provider:  (Not yet assigned)    02/23/24 0122    02/23/24 0121  Inpatient Rehab Admission Consult  Once        Provider:  (Not yet assigned)    02/23/24 0122    02/23/24 0121  Inpatient Case Management  Consult  Once        Provider:  (Not yet assigned)    02/23/24 0122    02/23/24 0121  Inpatient Diabetes Educator Consult  Once,   Status:  Canceled        Provider:  (Not yet assigned)    02/23/24 0122    02/23/24 0121  Inpatient Neurosurgery Consult  Once        Specialty:  Neurosurgery  Provider:  (Not yet assigned)    02/23/24 0122     02/23/24 0057  Pulmonology (on-call MD unless specified)  Once,   Status:  Canceled        Specialty:  Pulmonary Disease  Provider:  (Not yet assigned)    02/23/24 0056    02/23/24 0054  Neurosurgery (on-call MD unless specified)  Once        Specialty:  Neurosurgery  Provider:  Je Celeste MD    02/23/24 0053                  Procedures     Imaging Results (All)       Procedure Component Value Units Date/Time    CT Head Without Contrast [[id number]] Collected: 02/25/24 0344     Updated: 02/25/24 0344    Narrative:        Patient: JOSÉ HORTON  Time Out: 03:43  Exam(s): CT HEAD Without Contrast     EXAM:    CT Head Without Intravenous Contrast    CLINICAL HISTORY:       Follow-up.    TECHNIQUE:    Axial computed tomography images of the head brain without intravenous   contrast.  CTDI is 54.42 mGy and DLP is 1020.9 mGy-cm.  This CT exam was   performed according to the principle of ALARA (As Low As Reasonably   Achievable) by using one or more of the following dose reduction   techniques: automated exposure control, adjustment of the mA and or kV   according to patient size, and or use of iterative reconstruction   technique.    COMPARISON:    CT head 2 24 2024    FINDINGS:    Brain:  Stable left subdural hematoma in minimal subarachnoid   hemorrhage.  No mass-effect or midline shift.  No significant white   matter disease.    Ventricles:  Unremarkable.  No ventriculomegaly.    Bones joints:  Unremarkable.  No acute fracture.    Soft tissues: Right periorbital soft tissue hematoma.    Sinuses:  Unremarkable as visualized.  No acute sinusitis.    Mastoid air cells:  Unremarkable as visualized.  No mastoid effusion.    IMPRESSION:       1.  Stable left subdural hematoma and minimal subarachnoid hemorrhage.  2.  No new finding.      Impression:          Electronically signed by Faith Vance MD on 02-25-24 at 0343    CT Head Without Contrast [[id number]] Collected: 02/24/24 0631     Updated: 02/24/24 0631     Narrative:        Patient: JOSÉ HORTON  Time Out: 06:30  Exam(s): CT HEAD Without Contrast     EXAM:    CT Head Without Intravenous Contrast    CLINICAL HISTORY:     Reason for exam: Follow-up traumatic subdural hematoma.    TECHNIQUE:    Axial computed tomography images of the head brain without intravenous   contrast.  CTDI is 54.92 mGy and DLP is 1037.7 mGy-cm.  This CT exam was   performed according to the principle of ALARA (As Low As Reasonably   Achievable) by using one or more of the following dose reduction   techniques: automated exposure control, adjustment of the mA and or kV   according to patient size, and or use of iterative reconstruction   technique.    COMPARISON:    Noncontrast head CT 02 23 2024 0847 hrs.    FINDINGS:    Brain:  Stable acute subdural hematoma over the left cerebral   hemisphere measuring up to 5 mm is present with interval improvement in   superimposed acute subarachnoid hemorrhage best appreciated in the left   frontal sulci between the superior and middle frontal gyri.  No   significant white matter disease.    Ventricles:  Unremarkable.  No ventriculomegaly.    Bones joints:  Unremarkable.  No acute fracture.    Soft tissues:  Unremarkable.    Sinuses:  Unremarkable as visualized.  No acute sinusitis.    Mastoid air cells:  Unremarkable as visualized.  No mastoid effusion.    IMPRESSION:         Stable acute subdural hematoma over the left cerebral hemisphere   measuring up to 5 mm is present with interval improvement in superimposed   acute subarachnoid hemorrhage best appreciated in the left frontal sulci   between the superior and middle frontal gyri.        Impression:          Electronically signed by Steven Zhao MD on 02-24-24 at 0630    CT Head Without Contrast [[id number]] Collected: 02/23/24 1002     Updated: 02/23/24 1652    Narrative:      CT HEAD WITHOUT CONTRAST     HISTORY: Intracranial hemorrhage, follow-up.     COMPARISON: CT head 02/23/2024.      FINDINGS: A septum cavum pellucidum is noted. A small to moderate-sized  supraorbital hematoma is appreciated on the right similar in appearance  as compared to the prior examination. A subdural hematoma is appreciated  overlying the left cerebral hemisphere anteriorly, laterally and  superolaterally which has increased in attenuation as compared to  02/23/2024. It measures approximately 6 mm in thickness as measured  perpendicular to the inner table on the coronal reconstructions.  Subarachnoid blood is appreciated overlying the anterior and superior  aspect of the left frontal lobe, unchanged. There was no evidence of  midline shift, acute infarction or of intra-axial hemorrhage.       Impression:      The pre-existing subdural hematoma overlying the left  cerebral hemisphere is unchanged in size measuring approximately 6 mm in  thickness. It has increased in attenuation however. Subarachnoid blood  overlying the left frontal lobe is noted, present previously and  unchanged. Continued surveillance is recommended.     Radiation dose reduction techniques were utilized, including automated  exposure control and exposure modulation based on body size.        This report was finalized on 2/23/2024 4:49 PM by Dr. Cj Cole M.D  on Workstation: [id number]       CT Abdomen Pelvis Without Contrast [[id number]] Collected: 02/23/24 1144     Updated: 02/23/24 1230    Narrative:      CT SCAN OF THE ABDOMEN AND PELVIS WITHOUT CONTRAST     HISTORY: Subarachnoid hemorrhage. Nausea and vomiting.     FINDINGS: The CT scan was performed through the abdomen and pelvis  without contrast. The following findings are present:  1. The lung bases are clear. There is extensive low density throughout  the liver consistent with severe hepatic steatosis. No focal liver  lesions are seen. The spleen is not enlarged. The gallbladder shows no  gallstones or wall thickening.  2. There is some vague increased density and suggestion of  slight  haziness in the region of the head of pancreas and suspicious for  changes of mild acute pancreatitis and further clinical correlation is  recommended.  3. The adrenal glands and both kidneys are unremarkable. There is no  aortic aneurysm or retroperitoneal lymphadenopathy. The large and small  bowel loops are normal in caliber and show no inflammatory change. In  the pelvis, the urinary bladder has a smooth contour.        Radiation dose reduction techniques were utilized, including automated  exposure control and exposure modulation based on body size.        This report was finalized on 2/23/2024 12:27 PM by Dr. Sg Price M.D on Workstation: [id number]       CT Head Without Contrast [[id number]] Collected: 02/23/24 0044     Updated: 02/23/24 0058    Narrative:      CT OF THE HEAD WITHOUT CONTRAST; CT OF THE CERVICAL SPINE     HISTORY: Seizure     COMPARISON: February 22, 2024     TECHNIQUE: Axial CT imaging was obtained through the brain. No IV  contrast was administered. Axial CT imaging was obtained through the  cervical spine. Coronal and sagittal reformatted images were obtained.     FINDINGS:  CT OF THE HEAD: The patient has a mixed density (although predominantly  hyperdense) subdural hematoma overlying the left cerebral convexity.  Maximum thickness is 1.1 cm. It was not visible on the earlier study.  There is midline shift, measuring up to 6 mm. There is trace left  parafalcine subdural hemorrhage, measuring up to 2 mm, likely also a  trace amount overlying the left tentorium. The patient also appears to  have a small amount of subarachnoid hemorrhage. No calvarial fracture is  seen. Mucosal thickening is noted within the ethmoid sinuses. There is a  right supraorbital scalp hematoma/laceration. Right globe is intact.  There is no post septal extension.     CT OF THE CERVICAL SPINE: No acute fracture or subluxation of the  cervical spine is seen. Cervical vertebral body alignment appears  within  normal limits. There is no prevertebral soft tissue swelling. Images  through the lung apices demonstrate emphysematous changes, which is an  advanced finding for the age of 29. Thyroid gland and trachea are within  normal limits.       Impression:         1. Left cerebral convexity subdural hematoma with a maximum thickness of  1.1 cm. Midline shift of 6 mm. There is also trace subarachnoid  hemorrhage, as well as trace left parafalcine and left tentorial  hemorrhage.  2. No acute fracture or subluxation of the cervical spine is seen.     FINDINGS were discussed with Dr. Mckeon at 12:52 a.m.           Radiation dose reduction techniques were utilized, including automated  exposure control and exposure modulation based on body size.        This report was finalized on 2/23/2024 12:55 AM by Dr. Alexa Ferrer M.D on Workstation: [id number]       CT Cervical Spine Without Contrast [[id number]] Collected: 02/23/24 0044     Updated: 02/23/24 0058    Narrative:      CT OF THE HEAD WITHOUT CONTRAST; CT OF THE CERVICAL SPINE     HISTORY: Seizure     COMPARISON: February 22, 2024     TECHNIQUE: Axial CT imaging was obtained through the brain. No IV  contrast was administered. Axial CT imaging was obtained through the  cervical spine. Coronal and sagittal reformatted images were obtained.     FINDINGS:  CT OF THE HEAD: The patient has a mixed density (although predominantly  hyperdense) subdural hematoma overlying the left cerebral convexity.  Maximum thickness is 1.1 cm. It was not visible on the earlier study.  There is midline shift, measuring up to 6 mm. There is trace left  parafalcine subdural hemorrhage, measuring up to 2 mm, likely also a  trace amount overlying the left tentorium. The patient also appears to  have a small amount of subarachnoid hemorrhage. No calvarial fracture is  seen. Mucosal thickening is noted within the ethmoid sinuses. There is a  right supraorbital scalp hematoma/laceration.  Right globe is intact.  There is no post septal extension.     CT OF THE CERVICAL SPINE: No acute fracture or subluxation of the  cervical spine is seen. Cervical vertebral body alignment appears within  normal limits. There is no prevertebral soft tissue swelling. Images  through the lung apices demonstrate emphysematous changes, which is an  advanced finding for the age of 29. Thyroid gland and trachea are within  normal limits.       Impression:         1. Left cerebral convexity subdural hematoma with a maximum thickness of  1.1 cm. Midline shift of 6 mm. There is also trace subarachnoid  hemorrhage, as well as trace left parafalcine and left tentorial  hemorrhage.  2. No acute fracture or subluxation of the cervical spine is seen.     FINDINGS were discussed with Dr. Mckeon at 12:52 a.m.           Radiation dose reduction techniques were utilized, including automated  exposure control and exposure modulation based on body size.        This report was finalized on 2/23/2024 12:55 AM by Dr. Alexa Ferrer M.D on Workstation: [id number]       XR Chest 1 View [[id number]] Collected: 02/22/24 2044     Updated: 02/22/24 2048    Narrative:      XR CHEST 1 VW-     HISTORY: 29-year-old male with seizure.     FINDINGS: There is no evidence for pneumonia, effusions, CHF, or  pneumothorax.     This report was finalized on 2/22/2024 8:45 PM by Dr. Kimmy Carmona M.D on  Workstation: [id number]       CT Head Without Contrast [[id number]] Collected: 02/22/24 2036     Updated: 02/22/24 2044    Narrative:      CT HEAD WO CONTRAST-     INDICATIONS: Seizure     TECHNIQUE: Radiation dose reduction techniques were utilized, including  automated exposure control and exposure modulation based on body size.  Noncontrast head CT     COMPARISON: None available     FINDINGS:           No acute intracranial hemorrhage, midline shift or mass effect. No acute  territorial infarct is identified.        Cavum of septum pellucidum is noted  measuring 1.2 cm transversely,  compatible with cyst of the septum pellucidum. Ventricles, cisterns,  cerebral sulci are otherwise unremarkable for patient age.     The visualized paranasal sinuses, orbits, mastoid air cells are  unremarkable.          Impression:         No acute intracranial hemorrhage or hydrocephalus. Cyst of the septum  pellucidum.      For further evaluation of the cause of patient's seizure, if indicated,  enhanced MRI is advised.     This report was finalized on 2/22/2024 8:41 PM by Dr. Albert Moran M.D on Workstation: [id number]               Pertinent Labs     Results from last 7 days   Lab Units 02/27/24 0330 02/26/24 0445 02/25/24 0355 02/24/24  0521   WBC 10*3/mm3 6.14 5.31 4.83 5.32   HEMOGLOBIN g/dL 10.8* 10.5* 11.8* 10.0*   PLATELETS 10*3/mm3 128* 96* 75* 68*     Results from last 7 days   Lab Units 02/27/24 0330 02/26/24 0445 02/25/24  1451 02/25/24 0355 02/24/24  0521   SODIUM mmol/L 139 138  --  140 133*   POTASSIUM mmol/L 3.8 4.1 3.8 3.6 3.7   CHLORIDE mmol/L 106 109*  --  105 100   CO2 mmol/L 22.0 17.5*  --  23.0 16.5*   BUN mg/dL 5* 4*  --  <2* 2*   CREATININE mg/dL 0.88 0.67*  --  0.86 0.87   GLUCOSE mg/dL 91 179*  --  122* 104*   Estimated Creatinine Clearance: 115.4 mL/min (by C-G formula based on SCr of 0.88 mg/dL).  Results from last 7 days   Lab Units 02/27/24 0330 02/26/24 0445 02/25/24 0355 02/24/24  0521   ALBUMIN g/dL 3.5 3.3* 3.8 3.6   BILIRUBIN mg/dL 0.6 0.6 0.9 1.1   ALK PHOS U/L 220* 181* 194* 199*   AST (SGOT) U/L 138* 158* 156* 284*   ALT (SGPT) U/L 59* 57* 65* 71*     Results from last 7 days   Lab Units 02/27/24  0330 02/26/24  0445 02/25/24  0355 02/24/24  0521 02/23/24  0300 02/22/24 2217 02/22/24 2013   CALCIUM mg/dL 8.0* 6.9* 7.6* 7.5*   < > 7.9* 8.5*   ALBUMIN g/dL 3.5 3.3* 3.8 3.6   < >  --  4.7   MAGNESIUM mg/dL  --   --   --   --   --  1.8 1.2*   PHOSPHORUS mg/dL 2.3* 2.7 2.8 1.7*   < > 1.5*  --     < > = values in this interval not  displayed.     Results from last 7 days   Lab Units 02/23/24  0300   AMMONIA umol/L 50     Results from last 7 days   Lab Units 02/23/24  0511 02/22/24  2217   CK TOTAL U/L 236* 163       Results from last 7 days   Lab Units 02/23/24  0300   CHOLESTEROL mg/dL 161   TRIGLYCERIDES mg/dL 88   HDL CHOL mg/dL 78*   LDL CHOL mg/dL 67         Imaging Results (Last 24 Hours)       ** No results found for the last 24 hours. **            Test Results Pending at Discharge     Pending Labs       Order Current Status    Renal Function Panel In process              Discharge Exam   Physical Exam  Vitals.  Temperature 99 a pulse of 68 respiratory rate of 16 blood pressure 128/84 and O2 sats of 100% on room air  General.  Middle-aged gentleman.  He is alert and oriented x 4.  In no apparent pain/distress/diaphoresis.  Normal mood and affect.  Eyes.  Pupils equal round and reactive.  Hematoma over the right orbit with sutures in place above the right eyebrow.  No pallor or jaundice and intact extraocular musculature  Oral cavity.  Moist mucous membrane.  Neck.  Supple.  No JVD.  No lymphadenopathy or thyromegaly.  Cardiovascular.  Regular rate and rhythm.  Bradycardia.  No murmurs.  Chest.  Clear to auscultation bilaterally with no added sounds.  Abdomen.  Soft lax.  No tenderness.  No organomegaly.  No guarding or rebound.  Extremities.  No clubbing/cyanosis/edema.  NS.  No acute focal neurological deficits    Discharge Details        Discharge Medications        New Medications        Instructions Start Date   acetaminophen 325 MG tablet  Commonly known as: TYLENOL   650 mg, Oral, Every 4 Hours PRN      folic acid 1 MG tablet  Commonly known as: FOLVITE   1 mg, Oral, Daily   Start Date: February 29, 2024     levETIRAcetam 500 MG tablet  Commonly known as: KEPPRA   500 mg, Oral, Every 12 Hours Scheduled      metoprolol tartrate 25 MG tablet  Commonly known as: LOPRESSOR   25 mg, Oral, Every 12 Hours Scheduled      multivitamin  capsule   1 capsule, Oral, Daily      nicotine 21 MG/24HR patch  Commonly known as: NICODERM CQ   1 patch, Transdermal, Every 24 Hours Scheduled   Start Date: February 29, 2024     thiamine 100 MG tablet  Commonly known as: VITAMIN B1   100 mg, Oral, Daily   Start Date: February 29, 2024              Allergies   Allergen Reactions    Penicillins Unknown - Low Severity    Sulfa Antibiotics Unknown - Low Severity         Discharge Disposition:  Condition: Stable    Diet:   Diet Order   Procedures    Diet: Regular/House Diet; Texture: Regular Texture (IDDSI 7); Fluid Consistency: Thin (IDDSI 0)       Activity:   Activity Instructions       Activity as Tolerated      Driving Restrictions      Type of Restriction: Driving    Driving Restrictions: No Driving Until Next Appointment    Other Activity Instructions      Activity Instructions: Home health PT to evaluate and treat    Up WIth Assist              Counseling : Abstain from smoking/alcohol.    CODE STATUS:    Code Status and Medical Interventions:   Ordered at: 02/23/24 0123     Code Status (Patient has no pulse and is not breathing):    CPR (Attempt to Resuscitate)     Medical Interventions (Patient has pulse or is breathing):    Full Support       Future Appointments   Date Time Provider Department Center   3/11/2024  4:30 PM [facility] M CT 1  [facility]   3/14/2024 11:45 AM Je Celeste MD [facility]   4/23/2024 11:00 AM Anthony Tatum MD [facility]     Additional Instructions for the Follow-ups that You Need to Schedule       Ambulatory Referral to Home Health   As directed      Face to Face Visit Date: 2/28/2024   Follow-up provider for Plan of Care?: I treated the patient in an acute care facility and will not continue treatment after discharge.   Follow-up provider: NO KNOWN PROVIDER [[id number]]   Reason/Clinical Findings: Seizures/head trauma/intracranial bleed   Describe mobility limitations that make leaving home difficult: As above    Nursing/Therapeutic Services Requested: Physical Therapy Skilled Nursing   Skilled nursing orders: Neurovascular assessments   PT orders: Therapeutic exercise Gait Training Transfer training Strengthening   Weight Bearing Status: As Tolerated   Frequency: 1 Week 1        Call MD With Problems / Concerns   As directed      Instructions: Call MD or return to ER if worsening headache/loss of consciousness/dizziness/gait abnormality/focal neurological symptoms/chest pain or shortness of breath    Order Comments: Instructions: Call MD or return to ER if worsening headache/loss of consciousness/dizziness/gait abnormality/focal neurological symptoms/chest pain or shortness of breath         Discharge Follow-up with PCP   As directed       Currently Documented PCP:    Provider, No Known    PCP Phone Number:    [phone]     Follow Up Details: Primary MD.  1 week.  Alcohol withdrawal seizure/subdural/subarachnoid traumatic hemorrhage/head trauma/alcoholic liver disease/hypertension/electrolyte imbalance/tobacco and alcohol use/anemia and thrombocytopenia        Discharge Follow-up with Specified Provider: GI.; 1 Month   As directed      To: GI.   Follow Up: 1 Month   Follow Up Details: Alcoholic liver disease.        Discharge Follow-up with Specified Provider: Neurology; 1 Month   As directed      To: Neurology   Follow Up: 1 Month   Follow Up Details: Alcohol withdrawal seizures and subarachnoid, subdural hemorrhage        Discharge Follow-up with Specified Provider: Neurosurgery; 2 Weeks   As directed      To: Neurosurgery   Follow Up: 2 Weeks   Follow Up Details: Alcohol withdrawal seizure and head trauma leading to subdural and subarachnoid hemorrhage               Follow-up Information       Je Celeste MD. Schedule an appointment as soon as possible for a visit in 2 week(s).    Specialty: Neurosurgery  Why: Follow up head CT  Contact information:  [address]  [id number] [phone]               Provider, No Known .    Why: Primary MD.  1 week.  Alcohol withdrawal seizure/subdural/subarachnoid traumatic hemorrhage/head trauma/alcoholic liver disease/hypertension/electrolyte imbalance/tobacco and alcohol use/anemia and thrombocytopenia  Contact information:  [facility] [id number] [phone]                               Time Spent on Discharge:  Greater than 30 minutes      Libra Jasso MD  [facility]  02/28/24  10:18 EST

## 2024-02-28 NOTE — PROGRESS NOTES
Case Management Discharge Note      Final Note: Home with brother, pt declined HH services. Brother to transport. Chanell Cast LCSW    Provided Post Acute Provider List?: N/A  Provided Post Acute Provider Quality & Resource List?: N/A    Selected Continued Care - Admitted Since 2/22/2024       Destination    No services have been selected for the patient.                Durable Medical Equipment    No services have been selected for the patient.                Dialysis/Infusion    No services have been selected for the patient.                Home Medical Care    No services have been selected for the patient.                Therapy    No services have been selected for the patient.                Community Resources    No services have been selected for the patient.                Community & DME    No services have been selected for the patient.                    Transportation Services  Private: Car    Final Discharge Disposition Code: 01 - home or self-care

## 2024-02-28 NOTE — NURSING NOTE
AAOX4. RA. SR. Vitals stable. Discharging with out patient resources for substance abuse. MD Joe with neuro stated okay to return to work with seizure precautions. Reviewed with the patient. All medications received from [facility] pharmacy.

## 2024-02-29 NOTE — OUTREACH NOTE
Prep Survey      Flowsheet Row Responses   [place] facility patient discharged from? [place]   Is LACE score < 7 ? No   Eligibility Readm Mgmt   Discharge diagnosis Subdural hematoma   Does the patient have one of the following disease processes/diagnoses(primary or secondary)? Other   Does the patient have Home health ordered? No   Is there a DME ordered? No   Prep survey completed? Yes            Karen BAY - Registered Nurse

## 2024-03-02 NOTE — PROGRESS NOTES
"Enter Query Response Below      Query Response: Patient has been monitored for brain compression that is clinically insignificant             If applicable, please update the problem list.     Patient: Nida Peck        : 1995  Account: [id number]           Admit Date: 2024        How to Respond to this query:       a. Click New Note     b. Answer query within the yellow box.                c. Update the Problem List, if applicable.      If you have any questions about this query contact me at: [email or web address]     :     Risk Factors: 29 y.o. male with past history of alcohol abuse, withdrawal, & thrombocytopenia who was admitted () for subdural hematoma.   Clinical Indicators: Per discharge summary, \"CT scan of the brain revealed a left cerebellar subdural hematoma associated with mid line shift, trace subarachnoid  hemorrhage with left tentorial hemorrhage.\" Per H&P, \"Has subdural hematoma with subarachnoid hemorrhage and midline shift and brain swelling.\"   Treatment: D5NS @ 100ml/hr (-); IV Keppra (-); PO Keppra (-); Discharged on PO Keppra      Please clarify if the patient being treated/monitored for one or more of the following:      Brain Compression  Brain compression, clinically insignificant  Other- specify ___________  Unable to determine       By submitting this query, we are merely seeking further clarification of documentation to accurately reflect all conditions that you are monitoring, evaluating, treating or that extend the hospitalization or utilize additional resources of care. Please utilize your independent clinical judgment when addressing the question(s) above.     This query and your response, once completed, will be entered into the legal medical record.    Sincerely,  Debbie Terry RN   Clinical Documentation Integrity Program     "

## 2024-03-04 ENCOUNTER — READMISSION MANAGEMENT (OUTPATIENT)
Dept: CALL CENTER | Facility: HOSPITAL | Age: 29
End: 2024-03-04
Payer: COMMERCIAL

## 2024-03-04 NOTE — OUTREACH NOTE
Medical Week 1 Survey      Flowsheet Row Responses   [facility] patient discharged from? [place]   Does the patient have one of the following disease processes/diagnoses(primary or secondary)? Other   Week 1 attempt successful? Yes   Call start time 1335   Call end time 1337   Discharge diagnosis Subdural hematoma   Person spoke with today (if not patient) and relationship patient   Meds reviewed with patient/caregiver? Yes   Does the patient have all medications ordered at discharge? Yes   Prescription comments no concerns or questions noted.   Is the patient taking all medications as directed (includes completed medication regime)? Yes   Does the patient have a primary care provider?  Yes   Comments regarding PCP Sees new pcp next week.   Has home health visited the patient within 72 hours of discharge? N/A   Psychosocial issues? No   Did the patient receive a copy of their discharge instructions? Yes   Nursing interventions Reviewed instructions with patient   What is the patient's perception of their health status since discharge? Improving   Is the patient/caregiver able to teach back signs and symptoms related to disease process for when to call PCP? Yes   Is the patient/caregiver able to teach back signs and symptoms related to disease process for when to call 911? Yes   Is the patient/caregiver able to teach back the hierarchy of who to call/visit for symptoms/problems? PCP, Specialist, Home health nurse, Urgent Care, ED, 911 Yes   Week 1 call completed? Yes   Graduated Yes   Wrap up additional comments Patient reports doing okay- patient reports he needs pain medication called in. Advised patient to speak with his pcp with this concern. No other concerns or questions noted.   Call end time 1337            Janine BILLINGSLEY - Registered Nurse

## 2024-03-14 ENCOUNTER — TELEPHONE (OUTPATIENT)
Dept: NEUROSURGERY | Facility: CLINIC | Age: 29
End: 2024-03-14

## 2024-03-14 NOTE — TELEPHONE ENCOUNTER
S/ patient and asked if he wants to get his CT head R/S, patient expressed that he would like a Monday, I called scheduling and I have him R/S for Monday 3/25/2024 @ [facility] and F/U with Dr. Celeste on 3/28/2024, tried calling patient back to advise, no answer and vmail came on, LM informing of appt. Will mail Appt reminders

## 2024-11-01 ENCOUNTER — HOSPITAL ENCOUNTER (INPATIENT)
Facility: HOSPITAL | Age: 29
LOS: 8 days | Discharge: HOME | DRG: 683 | End: 2024-11-09
Attending: EMERGENCY MEDICINE | Admitting: FAMILY MEDICINE
Payer: COMMERCIAL

## 2024-11-01 DIAGNOSIS — N17.9 AKI (ACUTE KIDNEY INJURY): ICD-10-CM

## 2024-11-01 DIAGNOSIS — E87.1 HYPONATREMIA: Primary | ICD-10-CM

## 2024-11-01 DIAGNOSIS — F29 PSYCHOSIS, UNSPECIFIED PSYCHOSIS TYPE: ICD-10-CM

## 2024-11-01 DIAGNOSIS — F10.20 ALCOHOLISM: ICD-10-CM

## 2024-11-01 DIAGNOSIS — F10.10 ALCOHOL ABUSE: ICD-10-CM

## 2024-11-01 LAB
ALBUMIN SERPL-MCNC: 5.1 G/DL (ref 3.5–5.2)
ALBUMIN/GLOB SERPL: 1.2 G/DL
ALP SERPL-CCNC: 223 U/L (ref 39–117)
ALT SERPL W P-5'-P-CCNC: 65 U/L (ref 1–41)
ANION GAP SERPL CALCULATED.3IONS-SCNC: 29.1 MMOL/L (ref 5–15)
AST SERPL-CCNC: 263 U/L (ref 1–40)
BASOPHILS # BLD AUTO: 0.05 10*3/MM3 (ref 0–0.2)
BASOPHILS NFR BLD AUTO: 0.5 % (ref 0–1.5)
BILIRUB SERPL-MCNC: 1.4 MG/DL (ref 0–1.2)
BUN SERPL-MCNC: 23 MG/DL (ref 6–20)
BUN/CREAT SERPL: 10.5 (ref 7–25)
CALCIUM SPEC-SCNC: 10.1 MG/DL (ref 8.6–10.5)
CHLORIDE SERPL-SCNC: 76 MMOL/L (ref 98–107)
CK SERPL-CCNC: 294 U/L (ref 20–200)
CO2 SERPL-SCNC: 19.9 MMOL/L (ref 22–29)
CREAT SERPL-MCNC: 2.19 MG/DL (ref 0.76–1.27)
DEPRECATED RDW RBC AUTO: 53.5 FL (ref 37–54)
EGFRCR SERPLBLD CKD-EPI 2021: 40.3 ML/MIN/1.73
EOSINOPHIL # BLD AUTO: 0.14 10*3/MM3 (ref 0–0.4)
EOSINOPHIL NFR BLD AUTO: 1.5 % (ref 0.3–6.2)
ERYTHROCYTE [DISTWIDTH] IN BLOOD BY AUTOMATED COUNT: 17.2 % (ref 12.3–15.4)
ETHANOL BLD-MCNC: <10 MG/DL (ref 0–10)
ETHANOL UR QL: <0.01 %
FLUAV SUBTYP SPEC NAA+PROBE: NOT DETECTED
FLUBV RNA ISLT QL NAA+PROBE: NOT DETECTED
GLOBULIN UR ELPH-MCNC: 4.3 GM/DL
GLUCOSE SERPL-MCNC: 91 MG/DL (ref 65–99)
HCT VFR BLD AUTO: 32.2 % (ref 37.5–51)
HGB BLD-MCNC: 10.8 G/DL (ref 13–17.7)
HOLD SPECIMEN: NORMAL
HOLD SPECIMEN: NORMAL
IMM GRANULOCYTES # BLD AUTO: 0.03 10*3/MM3 (ref 0–0.05)
IMM GRANULOCYTES NFR BLD AUTO: 0.3 % (ref 0–0.5)
LIPASE SERPL-CCNC: 13 U/L (ref 13–60)
LYMPHOCYTES # BLD AUTO: 1.52 10*3/MM3 (ref 0.7–3.1)
LYMPHOCYTES NFR BLD AUTO: 16.5 % (ref 19.6–45.3)
MCH RBC QN AUTO: 28.6 PG (ref 26.6–33)
MCHC RBC AUTO-ENTMCNC: 33.5 G/DL (ref 31.5–35.7)
MCV RBC AUTO: 85.2 FL (ref 79–97)
MONOCYTES # BLD AUTO: 0.58 10*3/MM3 (ref 0.1–0.9)
MONOCYTES NFR BLD AUTO: 6.3 % (ref 5–12)
NEUTROPHILS NFR BLD AUTO: 6.91 10*3/MM3 (ref 1.7–7)
NEUTROPHILS NFR BLD AUTO: 74.9 % (ref 42.7–76)
NRBC BLD AUTO-RTO: 0 /100 WBC (ref 0–0.2)
PLATELET # BLD AUTO: 61 10*3/MM3 (ref 140–450)
PMV BLD AUTO: 11.9 FL (ref 6–12)
POTASSIUM SERPL-SCNC: 3.5 MMOL/L (ref 3.5–5.2)
PROT SERPL-MCNC: 9.4 G/DL (ref 6–8.5)
RBC # BLD AUTO: 3.78 10*6/MM3 (ref 4.14–5.8)
RSV RNA NPH QL NAA+NON-PROBE: NOT DETECTED
SARS-COV-2 RNA RESP QL NAA+PROBE: NOT DETECTED
SODIUM SERPL-SCNC: 125 MMOL/L (ref 136–145)
TROPONIN T SERPL HS-MCNC: 7 NG/L
WBC NRBC COR # BLD AUTO: 9.23 10*3/MM3 (ref 3.4–10.8)
WHOLE BLOOD HOLD COAG: NORMAL
WHOLE BLOOD HOLD SPECIMEN: NORMAL

## 2024-11-01 PROCEDURE — 82550 ASSAY OF CK (CPK): CPT | Performed by: EMERGENCY MEDICINE

## 2024-11-01 PROCEDURE — 93005 ELECTROCARDIOGRAM TRACING: CPT | Performed by: REGISTERED NURSE

## 2024-11-01 PROCEDURE — 25810000003 SODIUM CHLORIDE 0.9 % SOLUTION: Performed by: FAMILY MEDICINE

## 2024-11-01 PROCEDURE — 25810000003 SODIUM CHLORIDE 0.9 % SOLUTION: Performed by: EMERGENCY MEDICINE

## 2024-11-01 PROCEDURE — 25010000002 THIAMINE PER 100 MG: Performed by: REGISTERED NURSE

## 2024-11-01 PROCEDURE — 84484 ASSAY OF TROPONIN QUANT: CPT | Performed by: REGISTERED NURSE

## 2024-11-01 PROCEDURE — 93010 ELECTROCARDIOGRAM REPORT: CPT | Performed by: STUDENT IN AN ORGANIZED HEALTH CARE EDUCATION/TRAINING PROGRAM

## 2024-11-01 PROCEDURE — 82077 ASSAY SPEC XCP UR&BREATH IA: CPT | Performed by: REGISTERED NURSE

## 2024-11-01 PROCEDURE — 25010000002 HEPARIN (PORCINE) PER 1000 UNITS: Performed by: FAMILY MEDICINE

## 2024-11-01 PROCEDURE — 80053 COMPREHEN METABOLIC PANEL: CPT

## 2024-11-01 PROCEDURE — 99285 EMERGENCY DEPT VISIT HI MDM: CPT

## 2024-11-01 PROCEDURE — 83690 ASSAY OF LIPASE: CPT

## 2024-11-01 PROCEDURE — 87637 SARSCOV2&INF A&B&RSV AMP PRB: CPT | Performed by: FAMILY MEDICINE

## 2024-11-01 PROCEDURE — 85025 COMPLETE CBC W/AUTO DIFF WBC: CPT

## 2024-11-01 PROCEDURE — 81001 URINALYSIS AUTO W/SCOPE: CPT | Performed by: EMERGENCY MEDICINE

## 2024-11-01 RX ORDER — LORAZEPAM 0.5 MG/1
2 TABLET ORAL
Status: DISPENSED | OUTPATIENT
Start: 2024-11-01 | End: 2024-11-06

## 2024-11-01 RX ORDER — BISACODYL 10 MG
10 SUPPOSITORY, RECTAL RECTAL DAILY PRN
Status: DISCONTINUED | OUTPATIENT
Start: 2024-11-01 | End: 2024-11-09 | Stop reason: HOSPADM

## 2024-11-01 RX ORDER — FOLIC ACID 1 MG/1
1 TABLET ORAL DAILY
Status: DISCONTINUED | OUTPATIENT
Start: 2024-11-01 | End: 2024-11-09 | Stop reason: HOSPADM

## 2024-11-01 RX ORDER — ONDANSETRON 2 MG/ML
4 INJECTION INTRAMUSCULAR; INTRAVENOUS EVERY 6 HOURS PRN
Status: DISCONTINUED | OUTPATIENT
Start: 2024-11-01 | End: 2024-11-09 | Stop reason: HOSPADM

## 2024-11-01 RX ORDER — LORAZEPAM 2 MG/ML
2 INJECTION INTRAMUSCULAR
Status: DISCONTINUED | OUTPATIENT
Start: 2024-11-01 | End: 2024-11-06

## 2024-11-01 RX ORDER — SODIUM CHLORIDE 0.9 % (FLUSH) 0.9 %
10 SYRINGE (ML) INJECTION AS NEEDED
Status: DISCONTINUED | OUTPATIENT
Start: 2024-11-01 | End: 2024-11-09 | Stop reason: HOSPADM

## 2024-11-01 RX ORDER — ACETAMINOPHEN 500 MG
500 TABLET ORAL ONCE
Status: COMPLETED | OUTPATIENT
Start: 2024-11-02 | End: 2024-11-02

## 2024-11-01 RX ORDER — SODIUM CHLORIDE 0.9 % (FLUSH) 0.9 %
10 SYRINGE (ML) INJECTION EVERY 12 HOURS SCHEDULED
Status: DISCONTINUED | OUTPATIENT
Start: 2024-11-01 | End: 2024-11-08

## 2024-11-01 RX ORDER — POLYETHYLENE GLYCOL 3350 17 G/17G
17 POWDER, FOR SOLUTION ORAL DAILY PRN
Status: DISCONTINUED | OUTPATIENT
Start: 2024-11-01 | End: 2024-11-09 | Stop reason: HOSPADM

## 2024-11-01 RX ORDER — MULTIPLE VITAMINS W/ MINERALS TAB 9MG-400MCG
1 TAB ORAL DAILY
Status: DISCONTINUED | OUTPATIENT
Start: 2024-11-02 | End: 2024-11-09 | Stop reason: HOSPADM

## 2024-11-01 RX ORDER — SODIUM CHLORIDE 9 MG/ML
125 INJECTION, SOLUTION INTRAVENOUS CONTINUOUS
Status: ACTIVE | OUTPATIENT
Start: 2024-11-01 | End: 2024-11-02

## 2024-11-01 RX ORDER — AMOXICILLIN 250 MG
2 CAPSULE ORAL 2 TIMES DAILY PRN
Status: DISCONTINUED | OUTPATIENT
Start: 2024-11-01 | End: 2024-11-09 | Stop reason: HOSPADM

## 2024-11-01 RX ORDER — SODIUM CHLORIDE 9 MG/ML
40 INJECTION, SOLUTION INTRAVENOUS AS NEEDED
Status: DISCONTINUED | OUTPATIENT
Start: 2024-11-01 | End: 2024-11-09 | Stop reason: HOSPADM

## 2024-11-01 RX ORDER — HEPARIN SODIUM 5000 [USP'U]/ML
5000 INJECTION, SOLUTION INTRAVENOUS; SUBCUTANEOUS EVERY 12 HOURS SCHEDULED
Status: DISCONTINUED | OUTPATIENT
Start: 2024-11-01 | End: 2024-11-09 | Stop reason: HOSPADM

## 2024-11-01 RX ORDER — THIAMINE HYDROCHLORIDE 100 MG/ML
200 INJECTION, SOLUTION INTRAMUSCULAR; INTRAVENOUS EVERY 8 HOURS SCHEDULED
Status: DISCONTINUED | OUTPATIENT
Start: 2024-11-01 | End: 2024-11-01 | Stop reason: SDUPTHER

## 2024-11-01 RX ORDER — LORAZEPAM 2 MG/ML
1 INJECTION INTRAMUSCULAR
Status: DISCONTINUED | OUTPATIENT
Start: 2024-11-01 | End: 2024-11-06

## 2024-11-01 RX ORDER — LORAZEPAM 0.5 MG/1
1 TABLET ORAL
Status: DISPENSED | OUTPATIENT
Start: 2024-11-01 | End: 2024-11-06

## 2024-11-01 RX ORDER — BISACODYL 5 MG/1
5 TABLET, DELAYED RELEASE ORAL DAILY PRN
Status: DISCONTINUED | OUTPATIENT
Start: 2024-11-01 | End: 2024-11-09 | Stop reason: HOSPADM

## 2024-11-01 RX ORDER — THIAMINE HYDROCHLORIDE 100 MG/ML
200 INJECTION, SOLUTION INTRAMUSCULAR; INTRAVENOUS EVERY 8 HOURS SCHEDULED
Status: DISCONTINUED | OUTPATIENT
Start: 2024-11-01 | End: 2024-11-06

## 2024-11-01 RX ORDER — FOLIC ACID 1 MG/1
1 TABLET ORAL DAILY
Status: DISCONTINUED | OUTPATIENT
Start: 2024-11-02 | End: 2024-11-01 | Stop reason: SDUPTHER

## 2024-11-01 RX ADMIN — SODIUM CHLORIDE 125 ML/HR: 9 INJECTION, SOLUTION INTRAVENOUS at 23:08

## 2024-11-01 RX ADMIN — Medication 10 ML: at 23:08

## 2024-11-01 RX ADMIN — SODIUM CHLORIDE 1000 ML: 9 INJECTION, SOLUTION INTRAVENOUS at 21:11

## 2024-11-01 RX ADMIN — THIAMINE HYDROCHLORIDE 200 MG: 100 INJECTION, SOLUTION INTRAMUSCULAR; INTRAVENOUS at 21:11

## 2024-11-01 RX ADMIN — HEPARIN SODIUM 5000 UNITS: 5000 INJECTION INTRAVENOUS; SUBCUTANEOUS at 23:18

## 2024-11-01 RX ADMIN — FOLIC ACID 1 MG: 1 TABLET ORAL at 18:32

## 2024-11-01 NOTE — ED PROVIDER NOTES
"Time: 5:16 PM EDT  Date of encounter:  11/1/2024  Independent Historian/Clinical History and Information was obtained by:   Patient    History is limited by: N/A    Chief Complaint   Patient presents with    Palpitations     Pt arrived ambulatory to triage with c/o of palpitations and SOA \"for a while\". [facility]. Pt reports his last drink was 2 days ago. Pt reports he used to drink a 1/5 of vodka a day. Pt also reports n/v, blurry vision, abdominal pain, and anxiety. Pt reports feeling this way when he detoxed from alcohol previously.     Abdominal Pain         History of Present Illness:  Patient is a 29 y.o. year old male who presents to the emergency department for evaluation of elevated heart rate.  Patient was sent over from the [facility].  He is admitted there for alcohol detox.  Last alcohol consumption was 48 hours ago approximately.  He also complains of headache, nausea, anxious feeling.  Used to drink 1/5 of vodka approximately daily.  KUNAL Tinajero    Patient is a 29-year-old male who presents with complaints of elevated heart rate, muscle aches, possible alcohol detox.  Patient states he had not drank in roughly 48 hours.  Does drink roughly fifth a day.  Has had this previously when he was withdrawing.  No other complaints this time    Patient Care Team  Primary Care Provider: Provider, No Known    Past Medical History:     Allergies   Allergen Reactions    Penicillins Unknown - Low Severity    Sulfa Antibiotics Unknown - Low Severity     History reviewed. No pertinent past medical history.  History reviewed. No pertinent surgical history.  History reviewed. No pertinent family history.    Home Medications:  Prior to Admission medications    Medication Sig Start Date End Date Taking? Authorizing Provider   acetaminophen (TYLENOL) 325 MG tablet Take 2 tablets by mouth Every 4 (Four) Hours As Needed for Mild Pain or Fever - Temp greater than or equal to " "37 C 2/28/24   Libra Jasso MD   folic acid (FOLVITE) 1 MG tablet Take 1 tablet by mouth Daily. 2/29/24   Libra Jasso MD   levETIRAcetam (KEPPRA) 500 MG tablet Take 1 tablet by mouth Every 12 (Twelve) Hours. 2/28/24   Libra Jasso MD   metoprolol tartrate (LOPRESSOR) 25 MG tablet Take 1 tablet by mouth Every 12 (Twelve) Hours. 2/28/24   Libra Jasso MD   Multivitamin tablet tablet Take 1 tablet by mouth Daily. 2/28/24 2/27/25  Libra Jasso MD   nicotine (NICODERM CQ) 21 MG/24HR patch Place 1 patch on the skin as directed by provider Daily. 2/29/24   Libra Jasso MD   thiamine (VITAMIN B1) 100 MG tablet Take 1 tablet by mouth Daily. 2/29/24   Libra Jasso MD        Social History:   Social History     Tobacco Use    Smoking status: Some Days     Types: Cigarettes     Start date: 2010    Smokeless tobacco: Never   Vaping Use    Vaping status: Never Used   Substance Use Topics    Alcohol use: Not Currently     Comment: pt states [facility] and his last drink was about a day and a half ago    Drug use: Never         Review of Systems:  Review of Systems   Cardiovascular:  Positive for palpitations.   Gastrointestinal:  Positive for nausea.   Neurological:  Positive for headaches.        Physical Exam:  /92 (BP Location: Right arm, Patient Position: Sitting)   Pulse 101   Temp 99 °F (37.2 °C) (Oral)   Resp 17   Ht 185.4 cm (73\")   Wt 63.9 kg (140 lb 14 oz)   SpO2 100%   BMI 18.59 kg/m²         Physical Exam  Vitals and nursing note reviewed.   Constitutional:       Appearance: Normal appearance.   HENT:      Head: Normocephalic and atraumatic.   Eyes:      General: No scleral icterus.  Cardiovascular:      Rate and Rhythm: Regular rhythm. Tachycardia present.      Heart sounds: Normal heart sounds.   Pulmonary:      Effort: Pulmonary effort is normal.      Breath sounds: Normal breath sounds.   Abdominal:      Palpations: Abdomen is soft.      " Tenderness: There is no abdominal tenderness.   Musculoskeletal:         General: Normal range of motion.      Cervical back: Normal range of motion.   Skin:     Findings: No rash.   Neurological:      General: No focal deficit present.      Mental Status: He is alert.                    Procedures:  Procedures      Medical Decision Making:      Comorbidities that affect care:    Substance Abuse    External Notes reviewed:    Reviewed visit from 6/20/2024      The following orders were placed and all results were independently analyzed by me:  Orders Placed This Encounter   Procedures    COVID PRE-OP / PRE-PROCEDURE SCREENING ORDER (NO ISOLATION) - Swab, Nasopharynx    COVID-19, FLU A/B, RSV PCR 1 HR TAT - Swab, Nasopharynx    Single High Sensitivity Troponin T    Ethanol    [place] Draw    Comprehensive Metabolic Panel    Lipase    Urinalysis With Microscopic If Indicated (No Culture) - Urine, Clean Catch    CBC Auto Differential    CK    Diet: Regular/House; Fluid Consistency: Thin (IDDSI 0)    Vital Signs    Continuous Pulse Oximetry    Obtain Baseline Clinical [place] Withdrawal Assessment - Ar (CIWA-Ar), Sedation Scale & Vital Signs    Clinical [place] Withdrawal Assessment (CIWA-Ar)    If CIWA-Ar Score Less Than 8 For 3 Consecutive Assessments, Monitor Every 4 Hours & Discontinue Assessment When CIWA-Ar Less Than 8 for 24 Hours    Obtain Pre & Post Sedation Scores With Every Sedative Dose - Hold For POSS Greater Than 2 or RASS of -3 or Less    Notify Provider - Withdrawal    Notify Provider of Abnormal Lab Results    Notify Provider - Vitals    Undress & Gown    Inpatient Hospitalist Consult    ECG 12 Lead Tachycardia    Insert Peripheral IV    Seizure Precautions    Safety Precautions    CBC & Differential    Green Top (Gel)    Lavender Top    Gold Top - SST    Light Blue Top       Medications Given in the Emergency Department:  Medications   Magnesium Standard Dose Replacement - Follow Nurse / BPA Driven  Protocol (has no administration in time range)   folic acid (FOLVITE) tablet 1 mg (1 mg Oral Given 11/1/24 1832)   thiamine (B-1) injection 200 mg (200 mg Intravenous Given 11/1/24 2111)     Followed by   thiamine (VITAMIN B-1) tablet 100 mg (has no administration in time range)   sodium chloride 0.9 % flush 10 mL (has no administration in time range)   sodium chloride 0.9 % bolus 1,000 mL (1,000 mL Intravenous New Bag 11/1/24 2111)        ED Course:    The patient was initially evaluated in the triage area where orders were placed. The patient was later dispositioned by Dixon Sarmiento MD.      The patient was advised to stay for completion of workup which includes but is not limited to communication of labs and radiological results, reassessment and plan. The patient was advised that leaving prior to disposition by a provider could result in critical findings that are not communicated to the patient.     ED Course as of 11/01/24 2139 Fri Nov 01, 2024 2124 EKG interpreted by me  Time: 1829  Heart rate 96  Sinus, normal QRS, normal axis, no acute ischemia [MA]      ED Course User Index  [MA] Dixon Sarmiento MD       Labs:    Lab Results (last 24 hours)       Procedure Component Value Units Date/Time    Single High Sensitivity Troponin T [[id number]]  (Normal) Collected: 11/01/24 1819    Specimen: Blood Updated: 11/01/24 1852     HS Troponin T 7 ng/L     Narrative:      High Sensitive Troponin T Reference Range:  <14.0 ng/L- Negative Female for AMI  <22.0 ng/L- Negative Male for AMI  >=14 - Abnormal Female indicating possible myocardial injury.  >=22 - Abnormal Male indicating possible myocardial injury.   Clinicians would have to utilize clinical acumen, EKG, Troponin, and serial changes to determine if it is an Acute Myocardial Infarction or myocardial injury due to an underlying chronic condition.         Ethanol [[id number]] Collected: 11/01/24 1819    Specimen: Blood Updated: 11/01/24 1852     Ethanol  <10 mg/dL      Ethanol % <0.010 %     Narrative:      Ethanol (Plasma)  <10 Essentially Negative    Toxic Concentrations           mg/dL    Flushing, slowing of reflexes    Impaired visual activity         Depression of CNS              >100  Possible Coma                  >300       CBC & Differential [[id number]]  (Abnormal) Collected: 11/01/24 1819    Specimen: Blood Updated: 11/01/24 1845    Narrative:      The following orders were created for panel order CBC & Differential.  Procedure                               Abnormality         Status                     ---------                               -----------         ------                     CBC Auto Differential[[id number]]        Abnormal            Final result               Scan Slide[[id number]]                                                                    Please view results for these tests on the individual orders.    Comprehensive Metabolic Panel [[id number]]  (Abnormal) Collected: 11/01/24 1819    Specimen: Blood Updated: 11/01/24 1852     Glucose 91 mg/dL      BUN 23 mg/dL      Creatinine 2.19 mg/dL      Sodium 125 mmol/L      Potassium 3.5 mmol/L      Chloride 76 mmol/L      CO2 19.9 mmol/L      Calcium 10.1 mg/dL      Total Protein 9.4 g/dL      Albumin 5.1 g/dL      ALT (SGPT) 65 U/L      AST (SGOT) 263 U/L      Alkaline Phosphatase 223 U/L      Total Bilirubin 1.4 mg/dL      Globulin 4.3 gm/dL      A/G Ratio 1.2 g/dL      BUN/Creatinine Ratio 10.5     Anion Gap 29.1 mmol/L      eGFR 40.3 mL/min/1.73     Narrative:      GFR Normal >60  Chronic Kidney Disease <60  Kidney Failure <15      Lipase [[id number]]  (Normal) Collected: 11/01/24 1819    Specimen: Blood Updated: 11/01/24 1852     Lipase 13 U/L     CBC Auto Differential [[id number]]  (Abnormal) Collected: 11/01/24 1819    Specimen: Blood Updated: 11/01/24 1845     WBC 9.23 10*3/mm3      RBC 3.78 10*6/mm3      Hemoglobin 10.8 g/dL      Hematocrit 32.2 %      MCV 85.2 fL      MCH  28.6 pg      MCHC 33.5 g/dL      RDW 17.2 %      RDW-SD 53.5 fl      MPV 11.9 fL      Platelets 61 10*3/mm3      Neutrophil % 74.9 %      Lymphocyte % 16.5 %      Monocyte % 6.3 %      Eosinophil % 1.5 %      Basophil % 0.5 %      Immature Grans % 0.3 %      Neutrophils, Absolute 6.91 10*3/mm3      Lymphocytes, Absolute 1.52 10*3/mm3      Monocytes, Absolute 0.58 10*3/mm3      Eosinophils, Absolute 0.14 10*3/mm3      Basophils, Absolute 0.05 10*3/mm3      Immature Grans, Absolute 0.03 10*3/mm3      nRBC 0.0 /100 WBC     CK [[id number]]  (Abnormal) Collected: 11/01/24 1819    Specimen: Blood Updated: 11/01/24 2124     Creatine Kinase 294 U/L              Imaging:    No Radiology Exams Resulted Within Past 24 Hours      Differential Diagnosis and Discussion:      Metabolic: Differential diagnosis includes but is not limited to hypertension, hyperglycemia, hyperkalemia, hypocalcemia, metabolic acidosis, hypokalemia, hypoglycemia, malnutrition, hypothyroidism, hyperthyroidism, and adrenal insufficiency.     All labs were reviewed and interpreted by me.    MDM     Patient is a 29-year-old male who presents with complaints of muscle aches, alcohol detox, elevated heart rate.  Found to have an FELI as well as hyponatremia.  Will give IV fluids.  Will admit to the hospital for further workup management.  Likely related to his alcohol abuse but will need hydration and reassessment.              Patient Care Considerations:    \      Consultants/Shared Management Plan:    Hospitalist: I have discussed the case with Dr. Burton who agrees to accept the patient for admission.    Social Determinants of Health:    Patient is independent, reliable, and has access to care.       Disposition and Care Coordination:    Admit:   Through independent evaluation of the patient's history, physical, and imperical data, the patient meets criteria for inpatient admission to the hospital.        Final diagnoses:   Hyponatremia   FELI (acute  kidney injury)   Alcohol abuse        ED Disposition       ED Disposition   Decision to Admit    Condition   --    Comment   --               This medical record created using voice recognition software.             Dixon Sarmiento MD  11/01/24 [id number]

## 2024-11-02 PROBLEM — F10.20 ALCOHOLISM: Status: ACTIVE | Noted: 2024-11-02

## 2024-11-02 LAB
ANION GAP SERPL CALCULATED.3IONS-SCNC: 15.3 MMOL/L (ref 5–15)
BACTERIA UR QL AUTO: ABNORMAL /HPF
BILIRUB UR QL STRIP: ABNORMAL
BUN SERPL-MCNC: 24 MG/DL (ref 6–20)
BUN/CREAT SERPL: 13.3 (ref 7–25)
CALCIUM SPEC-SCNC: 9 MG/DL (ref 8.6–10.5)
CHLORIDE SERPL-SCNC: 87 MMOL/L (ref 98–107)
CLARITY UR: ABNORMAL
CO2 SERPL-SCNC: 23.7 MMOL/L (ref 22–29)
COLOR UR: ABNORMAL
CREAT SERPL-MCNC: 1.8 MG/DL (ref 0.76–1.27)
DEPRECATED RDW RBC AUTO: 53.5 FL (ref 37–54)
EGFRCR SERPLBLD CKD-EPI 2021: 51 ML/MIN/1.73
ERYTHROCYTE [DISTWIDTH] IN BLOOD BY AUTOMATED COUNT: 17.1 % (ref 12.3–15.4)
GLUCOSE SERPL-MCNC: 118 MG/DL (ref 65–99)
GLUCOSE UR STRIP-MCNC: NEGATIVE MG/DL
HCT VFR BLD AUTO: 24.5 % (ref 37.5–51)
HGB BLD-MCNC: 8.2 G/DL (ref 13–17.7)
HGB UR QL STRIP.AUTO: ABNORMAL
HYALINE CASTS UR QL AUTO: ABNORMAL /LPF
KETONES UR QL STRIP: ABNORMAL
LEUKOCYTE ESTERASE UR QL STRIP.AUTO: ABNORMAL
MCH RBC QN AUTO: 28.7 PG (ref 26.6–33)
MCHC RBC AUTO-ENTMCNC: 33.5 G/DL (ref 31.5–35.7)
MCV RBC AUTO: 85.7 FL (ref 79–97)
NITRITE UR QL STRIP: NEGATIVE
PH UR STRIP.AUTO: 5.5 [PH] (ref 5–8)
PLATELET # BLD AUTO: 48 10*3/MM3 (ref 140–450)
PMV BLD AUTO: 12.1 FL (ref 6–12)
POTASSIUM SERPL-SCNC: 3.3 MMOL/L (ref 3.5–5.2)
PROT UR QL STRIP: ABNORMAL
QT INTERVAL: 346 MS
QTC INTERVAL: 438 MS
RBC # BLD AUTO: 2.86 10*6/MM3 (ref 4.14–5.8)
RBC # UR STRIP: ABNORMAL /HPF
REF LAB TEST METHOD: ABNORMAL
SODIUM SERPL-SCNC: 126 MMOL/L (ref 136–145)
SP GR UR STRIP: 1.02 (ref 1–1.03)
SQUAMOUS #/AREA URNS HPF: ABNORMAL /HPF
TRICHOMONAS #/AREA URNS HPF: ABNORMAL /HPF
UROBILINOGEN UR QL STRIP: ABNORMAL
WBC # UR STRIP: ABNORMAL /HPF
WBC NRBC COR # BLD AUTO: 5.13 10*3/MM3 (ref 3.4–10.8)
YEAST URNS QL MICRO: ABNORMAL /HPF

## 2024-11-02 PROCEDURE — 85027 COMPLETE CBC AUTOMATED: CPT | Performed by: FAMILY MEDICINE

## 2024-11-02 PROCEDURE — 25010000002 THIAMINE HCL 200 MG/2ML SOLUTION: Performed by: REGISTERED NURSE

## 2024-11-02 PROCEDURE — 94799 UNLISTED PULMONARY SVC/PX: CPT

## 2024-11-02 PROCEDURE — 36415 COLL VENOUS BLD VENIPUNCTURE: CPT | Performed by: FAMILY MEDICINE

## 2024-11-02 PROCEDURE — 94761 N-INVAS EAR/PLS OXIMETRY MLT: CPT

## 2024-11-02 PROCEDURE — 25810000003 SODIUM CHLORIDE 0.9 % SOLUTION: Performed by: FAMILY MEDICINE

## 2024-11-02 PROCEDURE — 25010000002 FLUCONAZOLE PER 200 MG: Performed by: FAMILY MEDICINE

## 2024-11-02 PROCEDURE — 80048 BASIC METABOLIC PNL TOTAL CA: CPT | Performed by: FAMILY MEDICINE

## 2024-11-02 PROCEDURE — 25010000002 LORAZEPAM PER 2 MG: Performed by: FAMILY MEDICINE

## 2024-11-02 PROCEDURE — 99223 1ST HOSP IP/OBS HIGH 75: CPT | Performed by: FAMILY MEDICINE

## 2024-11-02 PROCEDURE — 25010000002 HEPARIN (PORCINE) PER 1000 UNITS: Performed by: FAMILY MEDICINE

## 2024-11-02 RX ORDER — ACETAMINOPHEN 325 MG/1
650 TABLET ORAL EVERY 6 HOURS PRN
Status: DISCONTINUED | OUTPATIENT
Start: 2024-11-02 | End: 2024-11-09 | Stop reason: HOSPADM

## 2024-11-02 RX ORDER — FLUCONAZOLE 2 MG/ML
200 INJECTION, SOLUTION INTRAVENOUS ONCE
Status: COMPLETED | OUTPATIENT
Start: 2024-11-02 | End: 2024-11-02

## 2024-11-02 RX ORDER — METRONIDAZOLE 500 MG/1
2000 TABLET ORAL ONCE
Status: COMPLETED | OUTPATIENT
Start: 2024-11-02 | End: 2024-11-02

## 2024-11-02 RX ORDER — NICOTINE 21 MG/24HR
1 PATCH, TRANSDERMAL 24 HOURS TRANSDERMAL EVERY 24 HOURS
Status: DISCONTINUED | OUTPATIENT
Start: 2024-11-02 | End: 2024-11-09 | Stop reason: HOSPADM

## 2024-11-02 RX ADMIN — HEPARIN SODIUM 5000 UNITS: 5000 INJECTION INTRAVENOUS; SUBCUTANEOUS at 20:34

## 2024-11-02 RX ADMIN — FLUCONAZOLE IN SODIUM CHLORIDE 200 MG: 2 INJECTION, SOLUTION INTRAVENOUS at 00:59

## 2024-11-02 RX ADMIN — NICOTINE 1 PATCH: 14 PATCH, EXTENDED RELEASE TRANSDERMAL at 23:12

## 2024-11-02 RX ADMIN — LORAZEPAM 1 MG: 2 INJECTION INTRAMUSCULAR; INTRAVENOUS at 02:08

## 2024-11-02 RX ADMIN — THIAMINE HYDROCHLORIDE 200 MG: 100 INJECTION, SOLUTION INTRAMUSCULAR; INTRAVENOUS at 21:06

## 2024-11-02 RX ADMIN — ACETAMINOPHEN 500 MG: 500 TABLET ORAL at 00:06

## 2024-11-02 RX ADMIN — HEPARIN SODIUM 5000 UNITS: 5000 INJECTION INTRAVENOUS; SUBCUTANEOUS at 08:44

## 2024-11-02 RX ADMIN — THIAMINE HYDROCHLORIDE 200 MG: 100 INJECTION, SOLUTION INTRAMUSCULAR; INTRAVENOUS at 16:09

## 2024-11-02 RX ADMIN — NICOTINE 1 PATCH: 14 PATCH, EXTENDED RELEASE TRANSDERMAL at 00:58

## 2024-11-02 RX ADMIN — Medication 10 ML: at 20:35

## 2024-11-02 RX ADMIN — FOLIC ACID 1 MG: 1 TABLET ORAL at 08:44

## 2024-11-02 RX ADMIN — Medication 10 ML: at 08:44

## 2024-11-02 RX ADMIN — THIAMINE HYDROCHLORIDE 200 MG: 100 INJECTION, SOLUTION INTRAMUSCULAR; INTRAVENOUS at 05:02

## 2024-11-02 RX ADMIN — METRONIDAZOLE 2000 MG: 500 TABLET ORAL at 00:58

## 2024-11-02 RX ADMIN — SODIUM CHLORIDE 125 ML/HR: 9 INJECTION, SOLUTION INTRAVENOUS at 13:47

## 2024-11-02 RX ADMIN — Medication 1 TABLET: at 08:44

## 2024-11-02 NOTE — H&P
[facility]   HISTORY AND PHYSICAL    Patient Name: Nida Peck  : 1995  MRN: [id number]  Primary Care Physician:  Provider, No Known  Date of admission: 2024    Subjective   Subjective     Chief Complaint: Body aches, tachycardia    HPI:    Nida Peck is a 29 y.o. male with past medical history of alcoholism currently in rehab facility presenting to the ED for evaluation of tachycardia with bodyaches.  Patient states that he drinks around fifth of vodka daily but stopped 2 days ago to check himself into rehab for detox.  Earlier this today began to have palpitations with bodyaches accompanied along with nausea and vomiting.  Staff was worried so he was transferred here for further management and medical clearance.  In the ED patient was tachycardic on arrival with remaining vitals being within normal limits.  Labs showed that he had anemia with mildly elevated CK level, reduced renal function, abnormal LFTs, and  trichomoniasis on UA.  His COVID flu RSV was negative.  Incidentally patient was feeling much better and denied any recent fevers, headaches, focal weakness, chest pain, palpitation, shortness of breath, cough, diarrhea, constipation, dysuria, hematuria, hematochezia, melena, or anxiety.  Patient admitted for further evaluation and treatment.          Review of Systems   All systems were reviewed and negative except for: As per HPI    Personal History     Past Medical History:   Diagnosis Date   • Alcoholism        History reviewed. No pertinent surgical history.    Family History: family history is not on file. Otherwise pertinent FHx was reviewed and not pertinent to current issue.    Social History:  reports that he has been smoking cigarettes. He started smoking about 14 years ago. He has never used smokeless tobacco. He reports that he does not currently use alcohol. He reports that he does not use drugs.    Home Medications:  acetaminophen, folic acid, levETIRAcetam, metoprolol  tartrate, multivitamin, nicotine, and thiamine      Allergies:  Allergies   Allergen Reactions   • Penicillins Unknown - Low Severity   • Sulfa Antibiotics Unknown - Low Severity       Objective   Objective     Vitals:   Temp:  [98.7 °F (37.1 °C)-99 °F (37.2 °C)] 98.7 °F (37.1 °C)  Heart Rate:  [] 97  Resp:  [17-18] 18  BP: (108-133)/(82-92) 133/84  Physical Exam    Constitutional: Awake, alert   Eyes: PERRLA, sclerae anicteric, no conjunctival injection   HENT: NCAT, mucous membranes moist   Neck: Supple, no thyromegaly, no lymphadenopathy, trachea midline   Respiratory: Clear to auscultation bilaterally, nonlabored respirations    Cardiovascular: RRR, no murmurs, rubs, or gallops, palpable pedal pulses bilaterally   Gastrointestinal: Positive bowel sounds, soft, nontender, nondistended   Musculoskeletal: No bilateral ankle edema, no clubbing or cyanosis to extremities   Psychiatric: Appropriate affect, cooperative   Neurologic: Oriented x 3, strength symmetric in all extremities, Cranial Nerves grossly intact to confrontation, speech clear   Skin: No rashes     Result Review    Result Review:  I have personally reviewed the results from the time of this admission to 11/2/2024 00:11 EDT and agree with these findings:  [x]  Laboratory list / accordion  []  Microbiology  [x]  Radiology  [x]  EKG/Telemetry   []  Cardiology/Vascular   []  Pathology  []  Old records  []  Other:  Most notable findings include: Abnormal LFTs, trichomoniasis on urinalysis, anemia, reduced renal function, abnormal LFTs,      Assessment & Plan   Assessment / Plan     Brief Patient Summary:  Nida Peck is a 29 y.o. male with past medical history of alcoholism currently in rehab facility presenting to the ED for evaluation of tachycardia with bodyaches.  Patient states that he drinks around fifth of vodka daily but stopped 2 days ago to check himself into rehab for detox.     Active Hospital Problems:  Active Hospital Problems     Diagnosis    • **FELI (acute kidney injury)    • Alcoholism    • Alcoholic liver disease    • Anemia    • Hypertension    • Tobacco abuse      Plan:     Acute kidney injury  -Admit to telemetry  -Likely secondary to dehydration  -UA reviewed.  Hematuria, trichomonas noted  -IVF  -Avoid nephrotoxic drugs  -Consult nephrology if warranted  -Will follow along    Alcoholism  -[facility] protocol    Abnormal LFTs  -Severe hepatic steatosis noted on imaging 6 months prior  -Patient with some abdominal discomfort  -Repeat CT if warranted    Trichomoniasis  -2 g metronidazole given    History of seizures  -Precautions    Alcoholic liver disease    GI ppx  DVT ppx    VTE Prophylaxis:  Pharmacologic VTE prophylaxis orders are present.        CODE STATUS: Full code     Admission Status:  I believe this patient meets inpatient status.      Electronically signed by Denver Burton MD, 11/02/24, 12:11 AM EDT.

## 2024-11-02 NOTE — PLAN OF CARE
Goal Outcome Evaluation:              Outcome Evaluation: Pt is alert and orient x4.  Vs Wnl for pt.  Pt denies any pain this shift.

## 2024-11-03 LAB
ANION GAP SERPL CALCULATED.3IONS-SCNC: 16 MMOL/L (ref 5–15)
BUN SERPL-MCNC: 14 MG/DL (ref 6–20)
BUN/CREAT SERPL: 13.1 (ref 7–25)
CALCIUM SPEC-SCNC: 8.7 MG/DL (ref 8.6–10.5)
CHLORIDE SERPL-SCNC: 95 MMOL/L (ref 98–107)
CO2 SERPL-SCNC: 22 MMOL/L (ref 22–29)
CREAT SERPL-MCNC: 1.07 MG/DL (ref 0.76–1.27)
DEPRECATED RDW RBC AUTO: 54.2 FL (ref 37–54)
EGFRCR SERPLBLD CKD-EPI 2021: 95.1 ML/MIN/1.73
ERYTHROCYTE [DISTWIDTH] IN BLOOD BY AUTOMATED COUNT: 17.2 % (ref 12.3–15.4)
GLUCOSE SERPL-MCNC: 95 MG/DL (ref 65–99)
HCT VFR BLD AUTO: 23.8 % (ref 37.5–51)
HGB BLD-MCNC: 7.5 G/DL (ref 13–17.7)
MCH RBC QN AUTO: 27.5 PG (ref 26.6–33)
MCHC RBC AUTO-ENTMCNC: 31.5 G/DL (ref 31.5–35.7)
MCV RBC AUTO: 87.2 FL (ref 79–97)
PLATELET # BLD AUTO: 69 10*3/MM3 (ref 140–450)
PMV BLD AUTO: 12.3 FL (ref 6–12)
POTASSIUM SERPL-SCNC: 3.1 MMOL/L (ref 3.5–5.2)
RBC # BLD AUTO: 2.73 10*6/MM3 (ref 4.14–5.8)
SODIUM SERPL-SCNC: 133 MMOL/L (ref 136–145)
WBC NRBC COR # BLD AUTO: 4.24 10*3/MM3 (ref 3.4–10.8)

## 2024-11-03 PROCEDURE — 36415 COLL VENOUS BLD VENIPUNCTURE: CPT | Performed by: FAMILY MEDICINE

## 2024-11-03 PROCEDURE — 94761 N-INVAS EAR/PLS OXIMETRY MLT: CPT

## 2024-11-03 PROCEDURE — 25010000002 THIAMINE HCL 200 MG/2ML SOLUTION: Performed by: REGISTERED NURSE

## 2024-11-03 PROCEDURE — 99232 SBSQ HOSP IP/OBS MODERATE 35: CPT | Performed by: FAMILY MEDICINE

## 2024-11-03 PROCEDURE — 25010000002 THIAMINE PER 100 MG: Performed by: REGISTERED NURSE

## 2024-11-03 PROCEDURE — 85027 COMPLETE CBC AUTOMATED: CPT | Performed by: FAMILY MEDICINE

## 2024-11-03 PROCEDURE — 25010000002 HEPARIN (PORCINE) PER 1000 UNITS: Performed by: FAMILY MEDICINE

## 2024-11-03 PROCEDURE — 80048 BASIC METABOLIC PNL TOTAL CA: CPT | Performed by: FAMILY MEDICINE

## 2024-11-03 PROCEDURE — 94799 UNLISTED PULMONARY SVC/PX: CPT

## 2024-11-03 RX ADMIN — THIAMINE HYDROCHLORIDE 200 MG: 100 INJECTION, SOLUTION INTRAMUSCULAR; INTRAVENOUS at 06:39

## 2024-11-03 RX ADMIN — Medication 10 ML: at 08:25

## 2024-11-03 RX ADMIN — NICOTINE 1 PATCH: 14 PATCH, EXTENDED RELEASE TRANSDERMAL at 00:03

## 2024-11-03 RX ADMIN — THIAMINE HYDROCHLORIDE 200 MG: 100 INJECTION, SOLUTION INTRAMUSCULAR; INTRAVENOUS at 14:07

## 2024-11-03 RX ADMIN — THIAMINE HYDROCHLORIDE 200 MG: 100 INJECTION, SOLUTION INTRAMUSCULAR; INTRAVENOUS at 21:04

## 2024-11-03 RX ADMIN — HEPARIN SODIUM 5000 UNITS: 5000 INJECTION INTRAVENOUS; SUBCUTANEOUS at 08:24

## 2024-11-03 RX ADMIN — Medication 1 TABLET: at 08:23

## 2024-11-03 RX ADMIN — ACETAMINOPHEN 650 MG: 325 TABLET ORAL at 14:07

## 2024-11-03 RX ADMIN — Medication 10 ML: at 21:04

## 2024-11-03 RX ADMIN — HEPARIN SODIUM 5000 UNITS: 5000 INJECTION INTRAVENOUS; SUBCUTANEOUS at 21:04

## 2024-11-03 RX ADMIN — FOLIC ACID 1 MG: 1 TABLET ORAL at 08:23

## 2024-11-03 NOTE — PLAN OF CARE
Goal Outcome Evaluation:  Plan of Care Reviewed With: patient           Outcome Evaluation: Pt VSS. CIWA scored range 1-0 during shift. No complaints  of pain or discomfort throughout. Continue plan of care.

## 2024-11-03 NOTE — PLAN OF CARE
Goal Outcome Evaluation:           Progress: no change  Outcome Evaluation: Vital signs stable. C/o headache today, prn Tylenol administered. No other significant changes this shift. Continue plan of care.

## 2024-11-04 LAB
ALBUMIN SERPL-MCNC: 3.9 G/DL (ref 3.5–5.2)
ALBUMIN/GLOB SERPL: 1.3 G/DL
ALP SERPL-CCNC: 162 U/L (ref 39–117)
ALT SERPL W P-5'-P-CCNC: 57 U/L (ref 1–41)
ANION GAP SERPL CALCULATED.3IONS-SCNC: 13.8 MMOL/L (ref 5–15)
AST SERPL-CCNC: 147 U/L (ref 1–40)
BASOPHILS # BLD AUTO: 0.04 10*3/MM3 (ref 0–0.2)
BASOPHILS NFR BLD AUTO: 0.8 % (ref 0–1.5)
BILIRUB SERPL-MCNC: 0.5 MG/DL (ref 0–1.2)
BUN SERPL-MCNC: 8 MG/DL (ref 6–20)
BUN/CREAT SERPL: 8.2 (ref 7–25)
CALCIUM SPEC-SCNC: 8.7 MG/DL (ref 8.6–10.5)
CHLORIDE SERPL-SCNC: 99 MMOL/L (ref 98–107)
CO2 SERPL-SCNC: 22.2 MMOL/L (ref 22–29)
CREAT SERPL-MCNC: 0.98 MG/DL (ref 0.76–1.27)
DEPRECATED RDW RBC AUTO: 55.8 FL (ref 37–54)
EGFRCR SERPLBLD CKD-EPI 2021: 105.7 ML/MIN/1.73
EOSINOPHIL # BLD AUTO: 0.24 10*3/MM3 (ref 0–0.4)
EOSINOPHIL NFR BLD AUTO: 4.8 % (ref 0.3–6.2)
ERYTHROCYTE [DISTWIDTH] IN BLOOD BY AUTOMATED COUNT: 17.6 % (ref 12.3–15.4)
GLOBULIN UR ELPH-MCNC: 3 GM/DL
GLUCOSE SERPL-MCNC: 104 MG/DL (ref 65–99)
HCT VFR BLD AUTO: 24.4 % (ref 37.5–51)
HGB BLD-MCNC: 8 G/DL (ref 13–17.7)
IMM GRANULOCYTES # BLD AUTO: 0.02 10*3/MM3 (ref 0–0.05)
IMM GRANULOCYTES NFR BLD AUTO: 0.4 % (ref 0–0.5)
LYMPHOCYTES # BLD AUTO: 1.81 10*3/MM3 (ref 0.7–3.1)
LYMPHOCYTES NFR BLD AUTO: 35.8 % (ref 19.6–45.3)
MCH RBC QN AUTO: 29 PG (ref 26.6–33)
MCHC RBC AUTO-ENTMCNC: 32.8 G/DL (ref 31.5–35.7)
MCV RBC AUTO: 88.4 FL (ref 79–97)
MONOCYTES # BLD AUTO: 0.53 10*3/MM3 (ref 0.1–0.9)
MONOCYTES NFR BLD AUTO: 10.5 % (ref 5–12)
NEUTROPHILS NFR BLD AUTO: 2.41 10*3/MM3 (ref 1.7–7)
NEUTROPHILS NFR BLD AUTO: 47.7 % (ref 42.7–76)
NRBC BLD AUTO-RTO: 0 /100 WBC (ref 0–0.2)
PLATELET # BLD AUTO: 117 10*3/MM3 (ref 140–450)
PMV BLD AUTO: 11.7 FL (ref 6–12)
POTASSIUM SERPL-SCNC: 3.2 MMOL/L (ref 3.5–5.2)
POTASSIUM SERPL-SCNC: 3.5 MMOL/L (ref 3.5–5.2)
PROT SERPL-MCNC: 6.9 G/DL (ref 6–8.5)
RBC # BLD AUTO: 2.76 10*6/MM3 (ref 4.14–5.8)
SODIUM SERPL-SCNC: 135 MMOL/L (ref 136–145)
WBC NRBC COR # BLD AUTO: 5.05 10*3/MM3 (ref 3.4–10.8)

## 2024-11-04 PROCEDURE — 25010000002 THIAMINE PER 100 MG: Performed by: REGISTERED NURSE

## 2024-11-04 PROCEDURE — 84132 ASSAY OF SERUM POTASSIUM: CPT | Performed by: FAMILY MEDICINE

## 2024-11-04 PROCEDURE — 94799 UNLISTED PULMONARY SVC/PX: CPT

## 2024-11-04 PROCEDURE — 25010000002 HEPARIN (PORCINE) PER 1000 UNITS: Performed by: FAMILY MEDICINE

## 2024-11-04 PROCEDURE — 99232 SBSQ HOSP IP/OBS MODERATE 35: CPT | Performed by: FAMILY MEDICINE

## 2024-11-04 PROCEDURE — 25010000002 THIAMINE HCL 200 MG/2ML SOLUTION: Performed by: REGISTERED NURSE

## 2024-11-04 PROCEDURE — 80053 COMPREHEN METABOLIC PANEL: CPT | Performed by: FAMILY MEDICINE

## 2024-11-04 PROCEDURE — 94761 N-INVAS EAR/PLS OXIMETRY MLT: CPT

## 2024-11-04 PROCEDURE — 85025 COMPLETE CBC W/AUTO DIFF WBC: CPT | Performed by: FAMILY MEDICINE

## 2024-11-04 RX ORDER — POTASSIUM CHLORIDE 750 MG/1
40 CAPSULE, EXTENDED RELEASE ORAL EVERY 4 HOURS
Status: COMPLETED | OUTPATIENT
Start: 2024-11-04 | End: 2024-11-04

## 2024-11-04 RX ADMIN — THIAMINE HYDROCHLORIDE 200 MG: 100 INJECTION, SOLUTION INTRAMUSCULAR; INTRAVENOUS at 06:28

## 2024-11-04 RX ADMIN — FOLIC ACID 1 MG: 1 TABLET ORAL at 08:38

## 2024-11-04 RX ADMIN — THIAMINE HYDROCHLORIDE 200 MG: 100 INJECTION, SOLUTION INTRAMUSCULAR; INTRAVENOUS at 23:40

## 2024-11-04 RX ADMIN — Medication 10 ML: at 08:38

## 2024-11-04 RX ADMIN — POTASSIUM CHLORIDE 40 MEQ: 750 CAPSULE, EXTENDED RELEASE ORAL at 16:20

## 2024-11-04 RX ADMIN — NICOTINE 1 PATCH: 14 PATCH, EXTENDED RELEASE TRANSDERMAL at 00:06

## 2024-11-04 RX ADMIN — Medication 1 TABLET: at 08:38

## 2024-11-04 RX ADMIN — THIAMINE HYDROCHLORIDE 200 MG: 100 INJECTION, SOLUTION INTRAMUSCULAR; INTRAVENOUS at 16:20

## 2024-11-04 RX ADMIN — LORAZEPAM 1 MG: 0.5 TABLET ORAL at 20:25

## 2024-11-04 RX ADMIN — HEPARIN SODIUM 5000 UNITS: 5000 INJECTION INTRAVENOUS; SUBCUTANEOUS at 23:40

## 2024-11-04 RX ADMIN — HEPARIN SODIUM 5000 UNITS: 5000 INJECTION INTRAVENOUS; SUBCUTANEOUS at 08:38

## 2024-11-04 RX ADMIN — Medication 10 ML: at 20:26

## 2024-11-04 RX ADMIN — POTASSIUM CHLORIDE 40 MEQ: 750 CAPSULE, EXTENDED RELEASE ORAL at 11:56

## 2024-11-04 NOTE — PLAN OF CARE
Goal Outcome Evaluation:           Progress: improving  Outcome Evaluation: Vss. No complaints during shift. Rested well. No acute changes. Continung plan of care.

## 2024-11-04 NOTE — PROGRESS NOTES
[facility]   Hospitalist Progress Note  Date: 11/3/2024  Patient Name: Nida Peck  : 1995  MRN: [id number]  Date of admission: 2024      Subjective   Subjective     Summary: 29-year-old male with a history of alcohol dependence who recently stopped drinking two days ago to enter a rehab facility for detox. He presents to the ED for evaluation of palpitations and body aches, which began earlier today, accompanied by nausea and vomiting. He was tachycardic upon arrival, with other vital signs within normal limits. Lab results revealed anemia, mildly elevated CK, decreased renal function, abnormal liver function tests, and trichomoniasis on urinalysis; viral panels for COVID, flu, and RSV were negative. He has been admitted for further evaluation and treatment.    Interval Followup:   Feeling gradually better  Cell lines still low/pancytopenic but improving slightly  Renal function improved   Potassium low  Requiring less Ativan      Objective   Objective     Vitals:   Temp:  [98.2 °F (36.8 °C)-99.5 °F (37.5 °C)] 99.5 °F (37.5 °C)  Heart Rate:  [70-87] 81  Resp:  [18] 18  BP: (106-128)/(66-87) 116/85  Physical Exam    Constitutional: Awake, alert, no acute distress   Respiratory: Clear to auscultation bilaterally, nonlabored respirations    Cardiovascular: RRR, no murmurs, rubs, or gallops, palpable pedal pulses bilaterally   Gastrointestinal: Positive bowel sounds, soft, nontender, nondistended   Psychiatric: Appropriate affect, cooperative   Neurologic: Oriented x 3, speech clear          Assessment & Plan   Assessment / Plan     Assessment/Plan:  Acute kidney injury  Alcoholism  Alcohol withdrawal  Alcoholic liver disease  Pancytopenia  Anemia  Hypertension  Tobacco use     Plan  Remain admitted to inpatient  Continue cardiac monitor  Continue CIWA protocol  CBC in am for surveillance of anemia and thrombocytopenia  Metabolic panel in the morning to evaluate electrolytes and renal  function  Discussed with RN  Risk of primary complaint: patient is at significant risk of morbidity if primary complaint is not treated especially considering the patient's comorbidities.  Discussed plan with RN.    VTE Prophylaxis:  Pharmacologic VTE prophylaxis orders are present.        CODE STATUS:   Code Status (Patient has no pulse and is not breathing): CPR (Attempt to Resuscitate)  Medical Interventions (Patient has pulse or is breathing): Full Support

## 2024-11-04 NOTE — PROGRESS NOTES
[facility]   Hospitalist Progress Note  Date: 2024  Patient Name: Nida Peck  : 1995  MRN: [id number]  Date of admission: 2024      Subjective   Subjective     Summary: 29-year-old male with a history of alcohol dependence who recently stopped drinking two days ago to enter a rehab facility for detox. He presents to the ED for evaluation of palpitations and body aches, which began earlier today, accompanied by nausea and vomiting. He was tachycardic upon arrival, with other vital signs within normal limits. Lab results revealed anemia, mildly elevated CK, decreased renal function, abnormal liver function tests, and trichomoniasis on urinalysis; viral panels for COVID, flu, and RSV were negative. He has been admitted for further evaluation and treatment.    Interval Followup:   Feeling gradually better  Cell lines still low/pancytopenic but improving   Renal function improved   Potassium low  Requiring less Ativan      Objective   Objective     Vitals:   Temp:  [98.4 °F (36.9 °C)-99.5 °F (37.5 °C)] 98.4 °F (36.9 °C)  Heart Rate:  [69-81] 69  Resp:  [18] 18  BP: ()/(61-85) 105/64  Physical Exam    Constitutional: Awake, alert, no acute distress   Respiratory: Clear to auscultation bilaterally, nonlabored respirations    Cardiovascular: RRR, no murmurs, rubs, or gallops, palpable pedal pulses bilaterally   Gastrointestinal: Positive bowel sounds, soft, nontender, nondistended   Psychiatric: Appropriate affect, cooperative   Neurologic: Oriented x 3, speech clear          Assessment & Plan   Assessment / Plan     Assessment/Plan:  Acute kidney injury  Alcoholism  Alcohol withdrawal  Alcoholic liver disease  Pancytopenia  Anemia  Hypertension  Tobacco use     Plan  Remain admitted to inpatient  Continue cardiac monitor  Continue CIWA protocol  Replace potassium  CBC in am for surveillance of anemia and thrombocytopenia  Metabolic panel in the morning to evaluate electrolytes and renal  function  Discussed plan with RN and SW.    Disposition: Planning discharge tomorrow    VTE Prophylaxis:  Pharmacologic VTE prophylaxis orders are present.    CODE STATUS:   Code Status (Patient has no pulse and is not breathing): CPR (Attempt to Resuscitate)  Medical Interventions (Patient has pulse or is breathing): Full Support

## 2024-11-05 LAB
ANION GAP SERPL CALCULATED.3IONS-SCNC: 16.9 MMOL/L (ref 5–15)
BUN SERPL-MCNC: 6 MG/DL (ref 6–20)
BUN/CREAT SERPL: 6 (ref 7–25)
CALCIUM SPEC-SCNC: 9 MG/DL (ref 8.6–10.5)
CHLORIDE SERPL-SCNC: 96 MMOL/L (ref 98–107)
CO2 SERPL-SCNC: 21.1 MMOL/L (ref 22–29)
CREAT SERPL-MCNC: 1 MG/DL (ref 0.76–1.27)
DEPRECATED RDW RBC AUTO: 60.3 FL (ref 37–54)
EGFRCR SERPLBLD CKD-EPI 2021: 103.2 ML/MIN/1.73
ERYTHROCYTE [DISTWIDTH] IN BLOOD BY AUTOMATED COUNT: 18.6 % (ref 12.3–15.4)
GLUCOSE SERPL-MCNC: 110 MG/DL (ref 65–99)
HCT VFR BLD AUTO: 29 % (ref 37.5–51)
HGB BLD-MCNC: 9.1 G/DL (ref 13–17.7)
MCH RBC QN AUTO: 28.6 PG (ref 26.6–33)
MCHC RBC AUTO-ENTMCNC: 31.4 G/DL (ref 31.5–35.7)
MCV RBC AUTO: 91.2 FL (ref 79–97)
PLATELET # BLD AUTO: 191 10*3/MM3 (ref 140–450)
PMV BLD AUTO: 10.4 FL (ref 6–12)
POTASSIUM SERPL-SCNC: 3.5 MMOL/L (ref 3.5–5.2)
POTASSIUM SERPL-SCNC: 4.1 MMOL/L (ref 3.5–5.2)
RBC # BLD AUTO: 3.18 10*6/MM3 (ref 4.14–5.8)
SODIUM SERPL-SCNC: 134 MMOL/L (ref 136–145)
WBC NRBC COR # BLD AUTO: 6.77 10*3/MM3 (ref 3.4–10.8)

## 2024-11-05 PROCEDURE — 25010000002 THIAMINE HCL 200 MG/2ML SOLUTION: Performed by: REGISTERED NURSE

## 2024-11-05 PROCEDURE — 36415 COLL VENOUS BLD VENIPUNCTURE: CPT | Performed by: FAMILY MEDICINE

## 2024-11-05 PROCEDURE — 84132 ASSAY OF SERUM POTASSIUM: CPT | Performed by: FAMILY MEDICINE

## 2024-11-05 PROCEDURE — 85027 COMPLETE CBC AUTOMATED: CPT | Performed by: FAMILY MEDICINE

## 2024-11-05 PROCEDURE — 99232 SBSQ HOSP IP/OBS MODERATE 35: CPT | Performed by: FAMILY MEDICINE

## 2024-11-05 PROCEDURE — 80048 BASIC METABOLIC PNL TOTAL CA: CPT | Performed by: FAMILY MEDICINE

## 2024-11-05 PROCEDURE — 25010000002 LORAZEPAM PER 2 MG: Performed by: FAMILY MEDICINE

## 2024-11-05 PROCEDURE — 25010000002 HALOPERIDOL LACTATE PER 5 MG: Performed by: FAMILY MEDICINE

## 2024-11-05 PROCEDURE — 25010000002 HEPARIN (PORCINE) PER 1000 UNITS: Performed by: FAMILY MEDICINE

## 2024-11-05 PROCEDURE — 25010000002 THIAMINE PER 100 MG: Performed by: REGISTERED NURSE

## 2024-11-05 RX ORDER — HALOPERIDOL 5 MG/ML
2 INJECTION INTRAMUSCULAR ONCE
Status: DISCONTINUED | OUTPATIENT
Start: 2024-11-05 | End: 2024-11-09 | Stop reason: HOSPADM

## 2024-11-05 RX ORDER — POTASSIUM CHLORIDE 750 MG/1
40 CAPSULE, EXTENDED RELEASE ORAL EVERY 4 HOURS
Status: COMPLETED | OUTPATIENT
Start: 2024-11-05 | End: 2024-11-05

## 2024-11-05 RX ADMIN — Medication 10 ML: at 08:59

## 2024-11-05 RX ADMIN — Medication 10 ML: at 20:38

## 2024-11-05 RX ADMIN — LORAZEPAM 1 MG: 2 INJECTION INTRAMUSCULAR; INTRAVENOUS at 18:16

## 2024-11-05 RX ADMIN — THIAMINE HYDROCHLORIDE 200 MG: 100 INJECTION, SOLUTION INTRAMUSCULAR; INTRAVENOUS at 22:27

## 2024-11-05 RX ADMIN — Medication 1 TABLET: at 08:58

## 2024-11-05 RX ADMIN — POTASSIUM CHLORIDE 40 MEQ: 750 CAPSULE, EXTENDED RELEASE ORAL at 08:58

## 2024-11-05 RX ADMIN — HEPARIN SODIUM 5000 UNITS: 5000 INJECTION INTRAVENOUS; SUBCUTANEOUS at 20:38

## 2024-11-05 RX ADMIN — POTASSIUM CHLORIDE 40 MEQ: 750 CAPSULE, EXTENDED RELEASE ORAL at 12:45

## 2024-11-05 RX ADMIN — LORAZEPAM 2 MG: 0.5 TABLET ORAL at 20:44

## 2024-11-05 RX ADMIN — NICOTINE 1 PATCH: 14 PATCH, EXTENDED RELEASE TRANSDERMAL at 02:55

## 2024-11-05 RX ADMIN — HEPARIN SODIUM 5000 UNITS: 5000 INJECTION INTRAVENOUS; SUBCUTANEOUS at 08:59

## 2024-11-05 RX ADMIN — THIAMINE HYDROCHLORIDE 200 MG: 100 INJECTION, SOLUTION INTRAMUSCULAR; INTRAVENOUS at 13:19

## 2024-11-05 RX ADMIN — FOLIC ACID 1 MG: 1 TABLET ORAL at 08:58

## 2024-11-05 RX ADMIN — LORAZEPAM 1 MG: 2 INJECTION INTRAMUSCULAR; INTRAVENOUS at 16:49

## 2024-11-05 RX ADMIN — THIAMINE HYDROCHLORIDE 200 MG: 100 INJECTION, SOLUTION INTRAMUSCULAR; INTRAVENOUS at 05:46

## 2024-11-05 RX ADMIN — LORAZEPAM 1 MG: 0.5 TABLET ORAL at 05:46

## 2024-11-05 NOTE — PLAN OF CARE
Goal Outcome Evaluation:              Outcome Evaluation: VSS. Pt has been anxious, having auditory, visual, and tactile hallucinations. Patient reports mattress feels as if it is moving. Pt hears voices threatening him, and believes he sees people in his room.

## 2024-11-05 NOTE — CONSULTS
"Nutrition Services    Patient Name: Nida Peck  YOB: 1995  MRN: [id number]  Admission date: 11/1/2024      CLINICAL NUTRITION ASSESSMENT      Reason for Assessment  BMI   H&P:  Past Medical History:   Diagnosis Date    Alcoholism         Current Problems:   Active Hospital Problems    Diagnosis     **FELI (acute kidney injury)     Alcoholism     Alcoholic liver disease     Anemia     Hypertension     Tobacco abuse         Nutrition/Diet History         Narrative   Patient having auditory, visual, and tactile hallucinations today. Nutrition interview not appropriate at this time. Over the past 9 months, patient with 4.8% decrease in weight. This is not clinically significant. Per chart graphics, patient with mostly 50% intake of meals. RD will order Magic Cup BID and CTM per protocol.     Anthropometrics        Current Height, Weight Height: 185.4 cm (73\")  Weight: 63.9 kg (140 lb 14 oz)   Current BMI Body mass index is 18.59 kg/m².   BMI Classification Normal range   % IBW 80%   Adjusted Body Weight (ABW) N/A   Weight Hx  Wt Readings from Last 30 Encounters:   11/01/24 1718 63.9 kg (140 lb 14 oz)   02/25/24 0400 67.1 kg (147 lb 14.9 oz)   02/23/24 0311 65.7 kg (144 lb 13.5 oz)   02/22/24 1954 74.8 kg (165 lb)          Wt Change Observation -4.8% x 9 months     Estimated/Assessed Needs  Estimated Needs based on: Ideal Body Weight       Energy Requirements 25-30 kcal/kg   EST Needs (kcal/day) 8923-0817 kcal       Protein Requirements 1.0 g/kg   EST Daily Needs (g/day) 80 g       Fluid Requirements 1 ml/kcal    Estimated Needs (mL/day) 6690-7701 mL     Labs/Medications         Pertinent Labs Reviewed.   Results from last 7 days   Lab Units 11/05/24  0616 11/04/24 1954 11/04/24  0625 11/03/24  0636 11/02/24  0523 11/01/24  1819   SODIUM mmol/L 134*  --  135* 133*   < > 125*   POTASSIUM mmol/L 3.5 3.5 3.2* 3.1*   < > 3.5   CHLORIDE mmol/L 96*  --  99 95*   < > 76*   CO2 mmol/L 21.1*  --  22.2 22.0   < > " 19.9*   BUN mg/dL 6  --  8 14   < > 23*   CREATININE mg/dL 1.00  --  0.98 1.07   < > 2.19*   CALCIUM mg/dL 9.0  --  8.7 8.7   < > 10.1   BILIRUBIN mg/dL  --   --  0.5  --   --  1.4*   ALK PHOS U/L  --   --  162*  --   --  223*   ALT (SGPT) U/L  --   --  57*  --   --  65*   AST (SGOT) U/L  --   --  147*  --   --  263*   GLUCOSE mg/dL 110*  --  104* 95   < > 91    < > = values in this interval not displayed.     Results from last 7 days   Lab Units 11/05/24  0616   HEMOGLOBIN g/dL 9.1*   HEMATOCRIT % 29.0*     COVID19   Date Value Ref Range Status   11/01/2024 Not Detected Not Detected - Ref. Range Final     Lab Results   Component Value Date    HGBA1C 5.50 02/23/2024         Pertinent Medications Reviewed.     Malnutrition Severity Assessment              Nutrition Diagnosis         Nutrition Dx Problem 1 Inadequate energy Intake r/t inadequate oral intake AEB 80% IBW.      Nutrition Intervention           Current Nutrition Orders & Evaluation of Intake       Current PO Diet Diet: Regular/House; Fluid Consistency: Thin (IDDSI 0)   Supplement Orders Placed This Encounter      Dietary Nutrition Supplements Magic Cup           Nutrition Intervention/Prescription        Magic Cup BID (+290 kcal, 9 g pro each)        Medical Nutrition Therapy/Nutrition Education          Learner     Readiness Patient  Education not appropriate at this time     Method     Response N/A  N/A     Monitor/Evaluation        Monitor Per protocol, PO intake, Supplement intake, Pertinent labs, Weight, POC/GOC     Nutrition Discharge Plan         To be determined     Electronically signed by:  Elaina Guadarrama, CONRAD  11/05/24 16:11 EST

## 2024-11-05 NOTE — PLAN OF CARE
Goal Outcome Evaluation:           Progress: no change  Outcome Evaluation: Vitals WNL. Pt experienced visual hallucinaions today. MD notified. Psych consut and evlaution is supposed to take place tomorrow morning. Pt has received Ativan and has Haldol for severe agiation. Pt has been walking in the halls and stating staff is staring at him and taking pictures of him. Pt stated he wanted to talk to the doctor personally and tell him why he is wrong about him. No other complaints this shift. Will continue plan of care.

## 2024-11-06 ENCOUNTER — APPOINTMENT (OUTPATIENT)
Dept: CT IMAGING | Facility: HOSPITAL | Age: 29
DRG: 683 | End: 2024-11-06
Payer: COMMERCIAL

## 2024-11-06 PROBLEM — F29 PSYCHOSIS: Status: ACTIVE | Noted: 2024-11-06

## 2024-11-06 LAB
AMPHET+METHAMPHET UR QL: NEGATIVE
AMPHETAMINES UR QL: NEGATIVE
ANION GAP SERPL CALCULATED.3IONS-SCNC: 18.3 MMOL/L (ref 5–15)
BARBITURATES UR QL SCN: NEGATIVE
BENZODIAZ UR QL SCN: POSITIVE
BUN SERPL-MCNC: 7 MG/DL (ref 6–20)
BUN/CREAT SERPL: 6.4 (ref 7–25)
BUPRENORPHINE SERPL-MCNC: NEGATIVE NG/ML
CALCIUM SPEC-SCNC: 9.1 MG/DL (ref 8.6–10.5)
CANNABINOIDS SERPL QL: NEGATIVE
CHLORIDE SERPL-SCNC: 97 MMOL/L (ref 98–107)
CO2 SERPL-SCNC: 17.7 MMOL/L (ref 22–29)
COCAINE UR QL: NEGATIVE
CREAT SERPL-MCNC: 1.09 MG/DL (ref 0.76–1.27)
CRP SERPL-MCNC: 0.47 MG/DL (ref 0–0.5)
D DIMER PPP FEU-MCNC: <0.27 MCGFEU/ML (ref 0–0.5)
DEPRECATED RDW RBC AUTO: 61.9 FL (ref 37–54)
EGFRCR SERPLBLD CKD-EPI 2021: 93.1 ML/MIN/1.73
ERYTHROCYTE [DISTWIDTH] IN BLOOD BY AUTOMATED COUNT: 19.2 % (ref 12.3–15.4)
FENTANYL UR-MCNC: NEGATIVE NG/ML
GLUCOSE SERPL-MCNC: 124 MG/DL (ref 65–99)
HCT VFR BLD AUTO: 26.3 % (ref 37.5–51)
HGB BLD-MCNC: 8.3 G/DL (ref 13–17.7)
L PNEUMO1 AG UR QL IA: NEGATIVE
MCH RBC QN AUTO: 28.6 PG (ref 26.6–33)
MCHC RBC AUTO-ENTMCNC: 31.6 G/DL (ref 31.5–35.7)
MCV RBC AUTO: 90.7 FL (ref 79–97)
METHADONE UR QL SCN: NEGATIVE
OPIATES UR QL: NEGATIVE
OXYCODONE UR QL SCN: NEGATIVE
PCP UR QL SCN: NEGATIVE
PLATELET # BLD AUTO: 287 10*3/MM3 (ref 140–450)
PMV BLD AUTO: 10.3 FL (ref 6–12)
POTASSIUM SERPL-SCNC: 3.8 MMOL/L (ref 3.5–5.2)
PROCALCITONIN SERPL-MCNC: 0.38 NG/ML (ref 0–0.25)
RBC # BLD AUTO: 2.9 10*6/MM3 (ref 4.14–5.8)
SODIUM SERPL-SCNC: 133 MMOL/L (ref 136–145)
T4 FREE SERPL-MCNC: 1.43 NG/DL (ref 0.92–1.68)
TRICYCLICS UR QL SCN: NEGATIVE
TSH SERPL DL<=0.05 MIU/L-ACNC: 1.06 UIU/ML (ref 0.27–4.2)
WBC NRBC COR # BLD AUTO: 5.69 10*3/MM3 (ref 3.4–10.8)

## 2024-11-06 PROCEDURE — 86769 SARS-COV-2 COVID-19 ANTIBODY: CPT | Performed by: PSYCHIATRY & NEUROLOGY

## 2024-11-06 PROCEDURE — 80048 BASIC METABOLIC PNL TOTAL CA: CPT | Performed by: FAMILY MEDICINE

## 2024-11-06 PROCEDURE — 36415 COLL VENOUS BLD VENIPUNCTURE: CPT | Performed by: FAMILY MEDICINE

## 2024-11-06 PROCEDURE — 70450 CT HEAD/BRAIN W/O DYE: CPT

## 2024-11-06 PROCEDURE — 84145 PROCALCITONIN (PCT): CPT | Performed by: FAMILY MEDICINE

## 2024-11-06 PROCEDURE — 82378 CARCINOEMBRYONIC ANTIGEN: CPT | Performed by: PSYCHIATRY & NEUROLOGY

## 2024-11-06 PROCEDURE — 84439 ASSAY OF FREE THYROXINE: CPT | Performed by: PSYCHIATRY & NEUROLOGY

## 2024-11-06 PROCEDURE — 86235 NUCLEAR ANTIGEN ANTIBODY: CPT | Performed by: PSYCHIATRY & NEUROLOGY

## 2024-11-06 PROCEDURE — 87449 NOS EACH ORGANISM AG IA: CPT | Performed by: PSYCHIATRY & NEUROLOGY

## 2024-11-06 PROCEDURE — 86606 ASPERGILLUS ANTIBODY: CPT | Performed by: PSYCHIATRY & NEUROLOGY

## 2024-11-06 PROCEDURE — 86611 BARTONELLA ANTIBODY: CPT | Performed by: PSYCHIATRY & NEUROLOGY

## 2024-11-06 PROCEDURE — 86593 SYPHILIS TEST NON-TREP QUANT: CPT | Performed by: PSYCHIATRY & NEUROLOGY

## 2024-11-06 PROCEDURE — 86789 WEST NILE VIRUS ANTIBODY: CPT | Performed by: PSYCHIATRY & NEUROLOGY

## 2024-11-06 PROCEDURE — 86788 WEST NILE VIRUS AB IGM: CPT | Performed by: PSYCHIATRY & NEUROLOGY

## 2024-11-06 PROCEDURE — 87484 EHRLICHA CHAFFEENSIS AMP PRB: CPT | Performed by: PSYCHIATRY & NEUROLOGY

## 2024-11-06 PROCEDURE — 86038 ANTINUCLEAR ANTIBODIES: CPT | Performed by: PSYCHIATRY & NEUROLOGY

## 2024-11-06 PROCEDURE — 25010000002 HEPARIN (PORCINE) PER 1000 UNITS: Performed by: FAMILY MEDICINE

## 2024-11-06 PROCEDURE — 86140 C-REACTIVE PROTEIN: CPT | Performed by: PSYCHIATRY & NEUROLOGY

## 2024-11-06 PROCEDURE — 85379 FIBRIN DEGRADATION QUANT: CPT | Performed by: PSYCHIATRY & NEUROLOGY

## 2024-11-06 PROCEDURE — 84443 ASSAY THYROID STIM HORMONE: CPT | Performed by: PSYCHIATRY & NEUROLOGY

## 2024-11-06 PROCEDURE — 85027 COMPLETE CBC AUTOMATED: CPT | Performed by: FAMILY MEDICINE

## 2024-11-06 PROCEDURE — 87476 LYME DIS DNA AMP PROBE: CPT | Performed by: PSYCHIATRY & NEUROLOGY

## 2024-11-06 PROCEDURE — 87798 DETECT AGENT NOS DNA AMP: CPT | Performed by: PSYCHIATRY & NEUROLOGY

## 2024-11-06 PROCEDURE — 86431 RHEUMATOID FACTOR QUANT: CPT | Performed by: PSYCHIATRY & NEUROLOGY

## 2024-11-06 PROCEDURE — 86757 RICKETTSIA ANTIBODY: CPT | Performed by: PSYCHIATRY & NEUROLOGY

## 2024-11-06 PROCEDURE — 99232 SBSQ HOSP IP/OBS MODERATE 35: CPT | Performed by: FAMILY MEDICINE

## 2024-11-06 PROCEDURE — 82607 VITAMIN B-12: CPT | Performed by: PSYCHIATRY & NEUROLOGY

## 2024-11-06 PROCEDURE — 80307 DRUG TEST PRSMV CHEM ANLYZR: CPT | Performed by: FAMILY MEDICINE

## 2024-11-06 PROCEDURE — 87469 BABESIA MICROTI AMP PRB: CPT | Performed by: PSYCHIATRY & NEUROLOGY

## 2024-11-06 RX ORDER — LORAZEPAM 2 MG/1
2 TABLET ORAL
Start: 2024-11-06 | End: 2024-11-09 | Stop reason: HOSPADM

## 2024-11-06 RX ORDER — TEMAZEPAM 15 MG/1
30 CAPSULE ORAL ONCE
Status: COMPLETED | OUTPATIENT
Start: 2024-11-06 | End: 2024-11-06

## 2024-11-06 RX ORDER — LORAZEPAM 1 MG/1
1 TABLET ORAL
Start: 2024-11-06 | End: 2024-11-09 | Stop reason: HOSPADM

## 2024-11-06 RX ADMIN — LORAZEPAM 1 MG: 0.5 TABLET ORAL at 02:49

## 2024-11-06 RX ADMIN — NICOTINE 1 PATCH: 14 PATCH, EXTENDED RELEASE TRANSDERMAL at 00:34

## 2024-11-06 RX ADMIN — HEPARIN SODIUM 5000 UNITS: 5000 INJECTION INTRAVENOUS; SUBCUTANEOUS at 08:57

## 2024-11-06 RX ADMIN — HEPARIN SODIUM 5000 UNITS: 5000 INJECTION INTRAVENOUS; SUBCUTANEOUS at 21:04

## 2024-11-06 RX ADMIN — Medication 1 TABLET: at 08:56

## 2024-11-06 RX ADMIN — FOLIC ACID 1 MG: 1 TABLET ORAL at 08:56

## 2024-11-06 RX ADMIN — Medication 100 MG: at 08:56

## 2024-11-06 RX ADMIN — TEMAZEPAM 30 MG: 15 CAPSULE ORAL at 21:04

## 2024-11-06 NOTE — PLAN OF CARE
Goal Outcome Evaluation:  Plan of Care Reviewed With: patient   Pt vss. Safety sitter initiated this shift. Possible plan of discharge to [facility] tomorrow.

## 2024-11-06 NOTE — CONSULTS
" [facility]   PSYCHIATRIC CONSULTATION    Patient Name: Nida Peck  : 1995  MRN: [id number]  Primary Care Physician:  Provider, No Known  Date of admission: 2024    Referring Provider: Dr Mccarthy  Reason for Consultation: \"hallucinations\"    Subjective   Subjective     Chief Complaint: psychosis     HPI:     Nida Peck is a 29 y.o. male initially seen standing in the hallway on [address] [place] [address]. Requested patient go to his room so he can have more privacy during interview. He is paranoid and irritable. He goes to room but seems to have difficulty sitting still initially and refuses to fully participate and is limited historian due to his psychosis and paranoia.     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 He states that various members of the gang have attempted to steal from him and \"want to kill me.\" He makes multiple other bizarre paranoid statements and seems to become increasingly agitated throughout the interview with some intrusive posturing. Nursing reports that he has not become physically aggressive but can become loud and angry when redirected.     He denies interest in medications. States he will refuse all psychiatric  medications. States \"I dont need it.\" States that he has not used drugs or alcohol recently. Reports that he wants to discharge. He appears to be responding to internal stimuli at times. There is no thought blocking noted but he does appear to look around room inappropriately at times. Nursing reports they believe he is hallucinating. Nurses states ativan was disinhibiting to patient and \"made things worse\" but stated haldol had been helpful. Patient feels that various staff " "have attacked him and are accusing him of things that aren't true. Patient has no insight and limited judgement.     Reviewed chart and per chart, patient admitted 11/2/2024 and presented to ED from rehab facility in community reporting tachycardia and body aches. Reported recent alcohol use, stated last drink 10/31/2024. Stated drank 5th vodka daily prior to that. He reported withdrawal symptoms. Staff at rehab facility was concerned and so brought him to ED. Per report, vitals wnl, anemia noted, elevated CK, reduced renal function, trichomoniasis on UA.  He was diagnosed with FELI per hospitalist which had resolved. Hospitalist also reported patient on CIWA with minimal withdrawal symptoms noted throughout admission. Patient \"suddenly\" became paranoid and increasingly agitated 2 days after admission. Of note, hospitalist reported patient has history of subdural hematoma.       Review of Systems  Unable to assess due to patient refused     Personal History     Past Medical History:   Diagnosis Date   • Alcoholism        History reviewed. No pertinent surgical history.    Past Psychiatric History:     Psychiatric Hospitalizations: unable to assess patient refused     Suicide Attempts: unable to assess patient refused    Prior Treatment and Medications Tried: unable to assess patient refused.         Family History: family history is not on file. Otherwise pertinent FHx was reviewed and not pertinent to current issue.    Social History:     Social History     Socioeconomic History   • Marital status: Single   Tobacco Use   • Smoking status: Some Days     Types: Cigarettes     Start date: 2010   • Smokeless tobacco: Never   Vaping Use   • Vaping status: Never Used   Substance and Sexual Activity   • Alcohol use: Not Currently     Comment: [facility] and his last drink was about a day and a half ago   • Drug use: Never   Patient refuses to provide details. Per statf mom is involved in " patients life. Patient was recently at substance use treatment program. Patient states he lives in the area.     Substance Abuse History:per chart review,  reports that he has been smoking cigarettes. He started smoking about 14 years ago. He has never used smokeless tobacco. He reports that he does not currently use alcohol. He reports that he does not use drugs. Per hospitalist patient reported alcohol and substance use on admission. See hpi.     Home Medications:  LORazepam, acetaminophen, folic acid, levETIRAcetam, metoprolol tartrate, multivitamin, nicotine, and thiamine      Allergies:  Allergies   Allergen Reactions   • Penicillins Unknown - Low Severity   • Sulfa Antibiotics Unknown - Low Severity       Objective   Objective     Vitals:   Temp:  [98 °F (36.7 °C)-99 °F (37.2 °C)] 98 °F (36.7 °C)  Heart Rate:  [] 60  Resp:  [18] 18  BP: (101-117)/(57-86) 105/67  Physical Exam   Per primary team     Mental Status Exam:     Hygiene:   decreased   Cooperation:  uncooperative   Eye Contact:  good   Psychomotor Behavior:no slowing or agitation    Mood: angry   Affect:  paranoid, distrustful   Speech:  normal rate, angry and paranoid tone, normal volume   Language: english, fluent   Thought Process:  concrete   Thought Content:  paranoid delusional content noted, denies si hi and avh but appears to be responding to internal stimuli   Suicidal:  denies   Homicidal:  denies   Hallucinations:  yes per nursing, patient denies, +RIS   Delusion:  paranoid   Memory:  impaired due to illness  Orientation:  states name, aware he is in hospital  Reliability:  poor  Insight:  poor  Judgement:  impaired   Impulse Control:  impaired    Result Review    Result Review:  I have personally reviewed the results from the time of this admission to 11/6/2024 18:00 EST and agree with these findings:  [x]  Laboratory  []  Microbiology  [x]  Radiology ct shows no acute intracranial process, left subdural hematoma resolved   [x]   EKG/Telemetry SR, pericarditis  []  Cardiology/Vascular   []  Pathology  []  Old records  []  Other:  Most notable findings include: sodium low, cr wnl, cbc showed anemia     Assessment & Plan   Assessment / Plan     Active Hospital Problems:  Active Hospital Problems    Diagnosis    • **FELI (acute kidney injury)    • Alcoholism    • Alcoholic liver disease    • Anemia    • Hypertension    • Tobacco abuse          Plan:   Patient with symptoms of psychosis and agitation that came on suddenly In setting of FELI, anemia, pericarditis, history off subdural hematoma, no resolved. Patient has history of substance use. Would rule out medical cause for confusion, obtain neurological consult to rule out neurological cause. Patient is limited historian and unclear baseline. Obtaining collateral to determine baseline will be helpful in guiding treatment. Recommend due to patients agitation and psychosis placing patient on one to one monitoring for safety. Given impaired insight and judgement, dangerousness to self and others, recommend patient be placed on legal hold should he demand to leave. Would obtain UDS to rule out substance use. Patients last drink reportedly 10/31/2024 and vitals have been unremarkable, unlikely to be alcohol withdrawal related but would continue to include in differential. Once medically cleared and neurology consult completed if no medical or neurological cause can consider transfer to psychiatry for continued psychiatric stabilization.     Continue to obtain history as patient will participate. Continue to obtain collateral. Continue to encourage abstinence. Patient reportedly became increasingly agitated with benzodiazepine. If not alcohol withdrawal avoid benzodiazepine and can consider low dose haldol, with close monitoring of metabolic labs as well as EKG and would utilize lowest effective dose.     Psychiatry to continue to follow.         Electronically signed by John Damon MD, 11/06/24,  6:00 PM EST.

## 2024-11-06 NOTE — PLAN OF CARE
Goal Outcome Evaluation:  Plan of Care Reviewed With: patient        Progress: no change     Patient remains confused. Having frequent auditory and visual hallucinations. Wandering the halls and following staff. Patient given ativan X2 per CIWA protocol. More agitated at times and does not believe he is in the hospital. Patient is suspicious of staff and believes there are people trying to hurt him. Patient removed his IV. Per on call Rosey, ok to leave IV out at this time. Awaiting psych eval.

## 2024-11-06 NOTE — PLAN OF CARE
Goal Outcome Evaluation:  Plan of Care Reviewed With: patient   Pt to [facility] to [facility].

## 2024-11-06 NOTE — PROGRESS NOTES
[facility]   Hospitalist Progress Note  Date: 2024  Patient Name: Nida Peck  : 1995  MRN: [id number]  Date of admission: 2024      Subjective   Subjective     Summary: 29-year-old male with a history of alcohol dependence who recently stopped drinking two days ago to enter a rehab facility for detox. He presents to the ED for evaluation of palpitations and body aches, which began earlier today, accompanied by nausea and vomiting. He was tachycardic upon arrival, with other vital signs within normal limits. Lab results revealed anemia, mildly elevated CK, decreased renal function, abnormal liver function tests, and trichomoniasis on urinalysis; viral panels for COVID, flu, and RSV were negative. He has been admitted for further evaluation and treatment.    Interval Followup:   Cell lines still low/pancytopenic but improving   Renal function improved   Patient started having hallucinations      Objective   Objective     Vitals:   Temp:  [97.8 °F (36.6 °C)-99 °F (37.2 °C)] 99 °F (37.2 °C)  Heart Rate:  [74-88] 88  Resp:  [18] 18  BP: (110-131)/(75-86) 117/86  Physical Exam    Constitutional: Awake, alert, no acute distress   Respiratory: Clear to auscultation bilaterally, nonlabored respirations    Cardiovascular: RRR, no murmurs, rubs, or gallops, palpable pedal pulses bilaterally   Gastrointestinal: Positive bowel sounds, soft, nontender, nondistended   Psychiatric: Appropriate affect, cooperative   Neurologic: Oriented x 3, speech clear          Assessment & Plan   Assessment / Plan     Assessment/Plan:  Acute kidney injury  Alcoholism  Alcohol withdrawal  Alcoholic liver disease  Pancytopenia  Anemia  Hypertension  Tobacco use     Plan  Remain admitted to inpatient  Consult psychiatry  PRN Ativan and Haldol  Continue cardiac monitor  Continue CIWA protocol  Replace potassium  CBC in am for surveillance of anemia and thrombocytopenia  Metabolic panel in the morning to evaluate electrolytes  and renal function  Discussed plan with RN and SW.    VTE Prophylaxis:  Pharmacologic VTE prophylaxis orders are present.    CODE STATUS:   Code Status (Patient has no pulse and is not breathing): CPR (Attempt to Resuscitate)  Medical Interventions (Patient has pulse or is breathing): Full Support

## 2024-11-07 ENCOUNTER — APPOINTMENT (OUTPATIENT)
Dept: MRI IMAGING | Facility: HOSPITAL | Age: 29
DRG: 683 | End: 2024-11-07
Payer: COMMERCIAL

## 2024-11-07 LAB
ANION GAP SERPL CALCULATED.3IONS-SCNC: 11.2 MMOL/L (ref 5–15)
ANISOCYTOSIS BLD QL: ABNORMAL
BUN SERPL-MCNC: 9 MG/DL (ref 6–20)
BUN/CREAT SERPL: 8.8 (ref 7–25)
CALCIUM SPEC-SCNC: 7.7 MG/DL (ref 8.6–10.5)
CEA SERPL-MCNC: 5.75 NG/ML
CHLORIDE SERPL-SCNC: 105 MMOL/L (ref 98–107)
CO2 SERPL-SCNC: 18.8 MMOL/L (ref 22–29)
CREAT SERPL-MCNC: 1.02 MG/DL (ref 0.76–1.27)
DEPRECATED RDW RBC AUTO: 64.4 FL (ref 37–54)
EGFRCR SERPLBLD CKD-EPI 2021: 100.8 ML/MIN/1.73
EOSINOPHIL # BLD MANUAL: 0.23 10*3/MM3 (ref 0–0.4)
EOSINOPHIL NFR BLD MANUAL: 4 % (ref 0.3–6.2)
ERYTHROCYTE [DISTWIDTH] IN BLOOD BY AUTOMATED COUNT: 19.6 % (ref 12.3–15.4)
GLUCOSE SERPL-MCNC: 150 MG/DL (ref 65–99)
HCT VFR BLD AUTO: 23.3 % (ref 37.5–51)
HGB BLD-MCNC: 7.2 G/DL (ref 13–17.7)
LARGE PLATELETS: ABNORMAL
LYMPHOCYTES # BLD MANUAL: 2.77 10*3/MM3 (ref 0.7–3.1)
LYMPHOCYTES NFR BLD MANUAL: 10 % (ref 5–12)
MCH RBC QN AUTO: 28.1 PG (ref 26.6–33)
MCHC RBC AUTO-ENTMCNC: 30.9 G/DL (ref 31.5–35.7)
MCV RBC AUTO: 91 FL (ref 79–97)
MONOCYTES # BLD: 0.57 10*3/MM3 (ref 0.1–0.9)
NEUTROPHILS # BLD AUTO: 2.09 10*3/MM3 (ref 1.7–7)
NEUTROPHILS NFR BLD MANUAL: 36 % (ref 42.7–76)
NEUTS BAND NFR BLD MANUAL: 1 % (ref 0–5)
PATHOLOGY REVIEW: YES
PLATELET # BLD AUTO: 300 10*3/MM3 (ref 140–450)
PMV BLD AUTO: 10.4 FL (ref 6–12)
POTASSIUM SERPL-SCNC: 4.1 MMOL/L (ref 3.5–5.2)
RBC # BLD AUTO: 2.56 10*6/MM3 (ref 4.14–5.8)
SMALL PLATELETS BLD QL SMEAR: ADEQUATE
SODIUM SERPL-SCNC: 135 MMOL/L (ref 136–145)
VARIANT LYMPHS NFR BLD MANUAL: 49 % (ref 19.6–45.3)
VIT B12 BLD-MCNC: 520 PG/ML (ref 211–946)
WBC MORPH BLD: NORMAL
WBC NRBC COR # BLD AUTO: 5.66 10*3/MM3 (ref 3.4–10.8)

## 2024-11-07 PROCEDURE — 85027 COMPLETE CBC AUTOMATED: CPT | Performed by: FAMILY MEDICINE

## 2024-11-07 PROCEDURE — 70551 MRI BRAIN STEM W/O DYE: CPT

## 2024-11-07 PROCEDURE — 83825 ASSAY OF MERCURY: CPT | Performed by: PSYCHIATRY & NEUROLOGY

## 2024-11-07 PROCEDURE — 80048 BASIC METABOLIC PNL TOTAL CA: CPT | Performed by: FAMILY MEDICINE

## 2024-11-07 PROCEDURE — 83655 ASSAY OF LEAD: CPT | Performed by: PSYCHIATRY & NEUROLOGY

## 2024-11-07 PROCEDURE — 25010000002 HEPARIN (PORCINE) PER 1000 UNITS: Performed by: FAMILY MEDICINE

## 2024-11-07 PROCEDURE — 99233 SBSQ HOSP IP/OBS HIGH 50: CPT | Performed by: STUDENT IN AN ORGANIZED HEALTH CARE EDUCATION/TRAINING PROGRAM

## 2024-11-07 PROCEDURE — 82175 ASSAY OF ARSENIC: CPT | Performed by: PSYCHIATRY & NEUROLOGY

## 2024-11-07 RX ADMIN — HEPARIN SODIUM 5000 UNITS: 5000 INJECTION INTRAVENOUS; SUBCUTANEOUS at 10:03

## 2024-11-07 RX ADMIN — HEPARIN SODIUM 5000 UNITS: 5000 INJECTION INTRAVENOUS; SUBCUTANEOUS at 22:15

## 2024-11-07 RX ADMIN — Medication 1 TABLET: at 10:03

## 2024-11-07 RX ADMIN — NICOTINE 1 PATCH: 14 PATCH, EXTENDED RELEASE TRANSDERMAL at 01:30

## 2024-11-07 RX ADMIN — FOLIC ACID 1 MG: 1 TABLET ORAL at 10:03

## 2024-11-07 RX ADMIN — Medication 100 MG: at 10:03

## 2024-11-07 NOTE — PROGRESS NOTES
" [facility]   Hospitalist Progress Note  Date: 2024  Patient Name: Nida Peck  : 1995  MRN: [id number]  Date of admission: 2024  Room/Bed: 3022/      Subjective   Subjective     Chief Complaint:   Chief Complaint   Patient presents with    Palpitations     Pt arrived ambulatory to triage with c/o of palpitations and SOA \"for a while\". Pt is at the [facility] for alcohol addiction. Pt reports his last drink was 2 days ago. Pt reports he used to drink a 1/5 of vodka a day. Pt also reports n/v, blurry vision, abdominal pain, and anxiety. Pt reports feeling this way when he detoxed from alcohol previously.     Abdominal Pain       Summary: 29-year-old male with past medical history of alcohol dependence admitted for palpitations and bodyaches in the setting of alcohol detox.  Last known alcoholic beverage was approximately 2 days prior to arrival in the ER.  When he arrived, he was found to be tachycardic with laboratory evaluation notable for acute kidney injury, abnormal liver function testing, positive trichomonas on urinalysis.  After supportive care, intravenous fluids and monitoring with CIWA protocol and Ativan patient improved, however was noted to have hallucinations and agitation, therefore, psychiatry and neurology consultants requested for further evaluation and management.    Given impaired insight and judgment in the setting of his confusion agitation and psychosis, patient was placed on involuntary psychiatric hold by psychiatrist pending medical clearance for transfer to inpatient psychiatry.  He is currently undergoing evaluation.    Interval Followup: Per neurologist note he has been having hallucinatory events prior to admission including as far back as 2024 or earlier.  EEG and MRI and blood work have been requested for further neurological evaluation.  I had prolonged discussion with the patient and he has agreed to get MRI to expedite workup and hopefully his " hospital discharge.  He is quite guarded, and hostile on exam, denies having any hallucinations or psychosis.          Objective   Objective     Vitals:   Temp:  [97.3 °F (36.3 °C)-98.4 °F (36.9 °C)] 98.4 °F (36.9 °C)  Heart Rate:  [63-76] 73  Resp:  [16-18] 16  BP: ()/(46-80) 118/80    Physical Exam   General: Awake, alert, man in no acute distress.  Slightly disheveled appearance, exhibits hostile and labile affect.  At times agitated during conversation.  HENT: Atraumatic, normocephalic.   Eyes: pupils equal, round, without scleral icterus  Cardiovascular: Regular rate and rhythm, no murmurs   Pulmonary: CTA bilaterally; no wheezes; no conversational dyspnea  Gastrointestinal: Soft nontender nondistended  Musculoskeletal: No gross deformities, or ankle edema  Skin: No jaundice, no rash on exposed skin appreciated  Neuro: speech clear; no tremor  Psych: Mood and affect not appropriate for exam  : No suprapubic tenderness    Result Review    Result Review:  I have personally reviewed these results:  [x]  Laboratory      Lab 11/07/24  0543 11/06/24  2106 11/06/24  0644 11/05/24  0616 11/04/24  0625 11/02/24  0523 11/01/24  1819   WBC 5.66  --  5.69 6.77 5.05   < > 9.23   HEMOGLOBIN 7.2*  --  8.3* 9.1* 8.0*   < > 10.8*   HEMATOCRIT 23.3*  --  26.3* 29.0* 24.4*   < > 32.2*   PLATELETS 300  --  287 191 117*   < > 61*   NEUTROS ABS  --   --   --   --  2.41  --  6.91   IMMATURE GRANS (ABS)  --   --   --   --  0.02  --  0.03   LYMPHS ABS  --   --   --   --  1.81  --  1.52   MONOS ABS  --   --   --   --  0.53  --  0.58   EOS ABS  --   --   --   --  0.24  --  0.14   MCV 91.0  --  90.7 91.2 88.4   < > 85.2   CRP  --  0.47  --   --   --   --   --    PROCALCITONIN  --   --  0.38*  --   --   --   --    D DIMER QUANT  --  <0.27  --   --   --   --   --     < > = values in this interval not displayed.         Lab 11/07/24  0543 11/06/24  2106 11/06/24  0644 11/05/24  1546 11/05/24  0616   SODIUM 135*  --  133*  --  134*    POTASSIUM 4.1  --  3.8 4.1 3.5   CHLORIDE 105  --  97*  --  96*   CO2 18.8*  --  17.7*  --  21.1*   ANION GAP 11.2  --  18.3*  --  16.9*   BUN 9  --  7  --  6   CREATININE 1.02  --  1.09  --  1.00   EGFR 100.8  --  93.1  --  103.2   GLUCOSE 150*  --  124*  --  110*   CALCIUM 7.7*  --  9.1  --  9.0   TSH  --  1.060  --   --   --          Lab 11/04/24  0625 11/01/24  1819   TOTAL PROTEIN 6.9 9.4*   ALBUMIN 3.9 5.1   GLOBULIN 3.0 4.3   ALT (SGPT) 57* 65*   AST (SGOT) 147* 263*   BILIRUBIN 0.5 1.4*   ALK PHOS 162* 223*   LIPASE  --  13         Lab 11/01/24  1819   HSTROP T 7             Lab 11/06/24  2106   VITAMIN B 12 520         Brief Urine Lab Results  (Last result in the past 365 days)        Color   Clarity   Blood   Leuk Est   Nitrite   Protein   CREAT   Urine HCG        11/01/24 2344 Dark Yellow   Turbid   Moderate (2+)   Large (3+)   Negative   >=300 mg/dL (3+)                 [x]  Microbiology   Microbiology Results (last 10 days)       Procedure Component Value - Date/Time    Legionella Antigen, Urine - Urine, Urine, Clean Catch [[id number]]  (Normal) Collected: 11/06/24 2127    Lab Status: Final result Specimen: Urine, Clean Catch Updated: 11/06/24 2152     LEGIONELLA ANTIGEN, URINE Negative    COVID PRE-OP / PRE-PROCEDURE SCREENING ORDER (NO ISOLATION) - Swab, Nasopharynx [[id number]]  (Normal) Collected: 11/01/24 2207    Lab Status: Final result Specimen: Swab from Nasopharynx Updated: 11/01/24 2253    Narrative:      The following orders were created for panel order COVID PRE-OP / PRE-PROCEDURE SCREENING ORDER (NO ISOLATION) - Swab, Nasopharynx.  Procedure                               Abnormality         Status                     ---------                               -----------         ------                     COVID-19, FLU A/B, RSV P...[[id number]]  Normal              Final result                 Please view results for these tests on the individual orders.    COVID-19, FLU A/B, RSV PCR 1 HR TAT -  Swab, Nasopharynx [[id number]]  (Normal) Collected: 11/01/24 2207    Lab Status: Final result Specimen: Swab from Nasopharynx Updated: 11/01/24 2253     COVID19 Not Detected     Influenza A PCR Not Detected     Influenza B PCR Not Detected     RSV, PCR Not Detected    Narrative:      Fact sheet for providers: https://www.fda.gov/media/550840/download    Fact sheet for patients: https://www.fda.gov/media/586203/download    Test performed by PCR.          [x]  Radiology  CT Head Without Contrast    Result Date: 11/6/2024  Impression: No acute intracranial process evident. The left subdural hematoma which was visualized on CT performed on February 25, 2024 has resolved. Electronically Signed: Ronny Olivas MD  11/6/2024 3:28 PM EST  Workstation ID: [id number]   []  EKG/Telemetry   []  Cardiology/Vascular   []  Pathology  []  Old records  []  Other:    Assessment & Plan   Assessment / Plan     Assessment:  Acute psychosis/metabolic encephalopathy: CT head with no acute intracranial process.  MRI brain pending, EEG pending, pending results hopefully we can plan for psychiatric transfer and outpatient follow-up of complete psychosis infectious and metabolic workup which is pending and will take some time. Continue inpatient involuntary psychiatric hold, and safety precautions.  Psychiatry and neurology notes reviewed.    Normocytic anemia: Present since admission.  In setting of chronic alcohol use this is most likely.  Follow-up TSH, heavy metal screen, also add peripheral smear.  Transfuse for less than 7 or active bleed    Acute kidney injury: Resolved  Alcoholism: Recommend alcohol cessation  Alcohol withdrawal: Resolved  Abnormal liver function testing: Likely from alcohol.  Recheck after discharge by PCP.         Discussed with RN.    VTE Prophylaxis:  Pharmacologic VTE prophylaxis orders are present.        CODE STATUS:   Code Status (Patient has no pulse and is not breathing): CPR (Attempt to Resuscitate)  Medical  Interventions (Patient has pulse or is breathing): Full Support      Electronically signed by Navi Sainz MD, 11/7/2024, 17:08 EST.

## 2024-11-07 NOTE — PLAN OF CARE
Goal Outcome Evaluation:         Patient rested through the night, safety watch  in place, will continue with plan of caer

## 2024-11-07 NOTE — PROGRESS NOTES
[facility]     Progress Note             Patient Name: Nida Peck  : 1995  MRN: [id number]  Primary Care Physician:  Provider, No Known  Date of admission: 2024        Present illness:  He is essentially unchanged.  He is still negativistic.  He still does not want to give information.  He thinks there is nothing wrong with him.  Moves all 4 extremities.    Review of Systems  No report of any headache, dizziness, nausea or vomiting.      Past Medical History:   Diagnosis Date    Alcoholism        History reviewed. No pertinent surgical history.    Family History: family history is not on file. Otherwise pertinent FHx was reviewed and not pertinent to current issue.    Social History:  reports that he has been smoking cigarettes. He started smoking about 14 years ago. He has never used smokeless tobacco. He reports that he does not currently use alcohol. He reports that he does not use drugs.      Home Medications:  LORazepam, acetaminophen, folic acid, levETIRAcetam, metoprolol tartrate, multivitamin, nicotine, and thiamine      Allergies:  Allergies   Allergen Reactions    Penicillins Unknown - Low Severity    Sulfa Antibiotics Unknown - Low Severity       Vitals:   Temp:  [97.3 °F (36.3 °C)-98.3 °F (36.8 °C)] 97.3 °F (36.3 °C)  Heart Rate:  [60-76] 63  Resp:  [16-18] 16  BP: ()/(46-74) 111/58    RESULT REVIEW:  I have personally reviewed the results from the time of this admission to 2024 10:16 EST and agree with these findings:  []  Laboratory  []  Microbiology  []  Radiology  []  EKG/Telemetry   []  Cardiology/Vascular   []  Pathology  []  Old records  []  Other:  Most notable findings include:   2024  The thyroid profile, serum B12 folate levels, C-reactive protein, D-dimer were all within normal limits.    2024.  I reviewed the digital images of the CAT scan of his brain done on 2024.  This study showed no acute changes.  The subdural hematoma  that was noted on the left side on February 23, 2024 is no longer seen.  This time, she has mild to moderate cortical atrophy which is more marked in the frontal and temporal regions.     November 6, 2024            WBC 5.69 10*3/mm3 MCHC 31.6 g/dL   RBC 2.90 Low  10*6/mm3 RDW 19.2 High  %   Hemoglobin 8.3 Low  g/dL RDW-SD 61.9 High  fl   Hematocrit 26.3 Low  % MPV 10.3 fL   MCV 90.7 fL Platelets 287 10*3/mm3   MCH 28.6 pg                November 6, 2024         Glucose 124 High  mg/dL CO2 17.7 Low  mmol/L   BUN 7 mg/dL Calcium 9.1 mg/dL   Creatinine 1.09 mg/dL BUN/Creatinine Ratio 6.4 Low    Sodium 133 Low  mmol/L Anion Gap 18.3 High  mmol/L   Potassium 3.8 mmol/L eGFR 93.1 mL/min/1.73   Chloride 97 Low  mmol/L            Impression:    Altered mental state  Partial complex seizures  Hyponatremia  Anemia, etiology undetermined  Chronic alcoholism        Plan:   Will we will see what the MRI of the brain, EEG and blood work show.      Electronically signed by Nakia Almaraz Jr., MD, 11/07/24, 10:16 AM EST.        Please note that portions of this note were completed with a voice recognition program.  Part of this note is an electric or electronic transcription/translation of spoken language to printed text using the dragon dictating system.

## 2024-11-07 NOTE — PROGRESS NOTES
[facility]   Hospitalist Progress Note  Date: 2024  Patient Name: Nida Peck  : 1995  MRN: [id number]  Date of admission: 2024      Subjective   Subjective     Summary: 29-year-old male with a history of alcohol dependence who recently stopped drinking two days ago to enter a rehab facility for detox. He presents to the ED for evaluation of palpitations and body aches, which began earlier today, accompanied by nausea and vomiting. He was tachycardic upon arrival, with other vital signs within normal limits. Lab results revealed anemia, mildly elevated CK, decreased renal function, abnormal liver function tests, and trichomoniasis on urinalysis; viral panels for COVID, flu, and RSV were negative. He was admitted for further evaluation and treatment.    His renal function improved and his pancytopenia improved as well.  He was treated with CIWA and Ativan weaned off.  He was stable for discharge back to the [facility] but started having hallucinations    Interval Followup:   Patient still having hallucinations      Objective   Objective     Vitals:   Temp:  [98 °F (36.7 °C)-99 °F (37.2 °C)] 98 °F (36.7 °C)  Heart Rate:  [] 65  Resp:  [18] 18  BP: ()/(46-86) 97/46  Physical Exam    Constitutional: Awake, alert, no acute distress   Respiratory: Clear to auscultation bilaterally, nonlabored respirations    Cardiovascular: RRR, no murmurs, rubs, or gallops, palpable pedal pulses bilaterally   Gastrointestinal: Positive bowel sounds, soft, nontender, nondistended   Psychiatric: Appropriate affect, cooperative   Neurologic: Oriented x 3, speech clear          Assessment & Plan   Assessment / Plan     Assessment/Plan:  Acute kidney injury  Alcoholism  Alcohol withdrawal  Alcoholic liver disease  Pancytopenia  Anemia  Hypertension  Tobacco use     Plan  Remain admitted to inpatient  Consult psychiatry and neuro  Check CT head  PRN Ativan   Safety sitter  Continue cardiac  monitor  Continue CIWA protocol  Replace potassium  CBC in am for surveillance of anemia and thrombocytopenia  Metabolic panel in the morning to evaluate electrolytes and renal function  Discussed plan with RN and SW.    VTE Prophylaxis:  Pharmacologic VTE prophylaxis orders are present.    CODE STATUS:   Code Status (Patient has no pulse and is not breathing): CPR (Attempt to Resuscitate)  Medical Interventions (Patient has pulse or is breathing): Full Support

## 2024-11-07 NOTE — PLAN OF CARE
Goal Outcome Evaluation:  Plan of Care Reviewed With: patient        Progress: no change  Outcome Evaluation: Patient has had a good day so far. MD was able to convince him to complete MRI of the brain. However, when MRI tech arrived, patient asked for a delay. He has since told me he is now ready for MRI. Hopefully, we can complete it this afternoon or tonight, I believe he needs it before he can transfer to [facility].

## 2024-11-07 NOTE — CONSULTS
[facility]   Consult Note      Patient Name: Nida Peck  : 1995  MRN: [id number]  Primary Care Physician:  Provider, No Known  Date of admission: 2024    Subjective   Subjective     This 69 years old gentleman was seen upon request of Ty Harry DO for evaluation    Chief Complaint:   Altered mental state    HPI:  The information was obtained from her friend who he had worked for for 6 months and the another friend who he had lived with for 5 months.    Apparently, has been having episodes where in he would be screaming, he was seeing a monster, demons during which, he is confused and disoriented.  The rest for about an hour or so.  When he comes to himself, he is lethargic, he did not know where he was, he does not know where things are.  This would last for nonpreventive time.  The friend who has been living with abdomen witnessed one of the spells sometime in 2024.  There were no report of any shaking or jerking movements.  Apparently, he had been doing this while he was living with his mother.  His mother became scared and wanted him out of the house.  He she did not want him to stay with her because she is afraid that he might harm her.    Review of Systems  Report of any headache, dizziness, nausea or vomiting.  No chest pains or abdominal pains.      Past Medical History:   Diagnosis Date   • Alcoholism        History reviewed. No pertinent surgical history.    Family History: family history is not on file. Otherwise pertinent FHx was reviewed and not pertinent to current issue.    Social History:  reports that he has been smoking cigarettes. He started smoking about 14 years ago. He has never used smokeless tobacco. He reports that he does not currently use alcohol. He reports that he does not use drugs.    According to his friend who he has been living with for the past 5 months, he drinks a lot.  Drinks a gallon of vodka every other day.  Currently, he spent all his money to buy  alcoholic drinks.    Psychosocial History: No reported psychiatric difficulties.    Home Medications:  LORazepam, acetaminophen, folic acid, levETIRAcetam, metoprolol tartrate, multivitamin, nicotine, and thiamine      Allergies:  Allergies   Allergen Reactions   • Penicillins Unknown - Low Severity   • Sulfa Antibiotics Unknown - Low Severity       Vitals:   Temp:  [98 °F (36.7 °C)-99 °F (37.2 °C)] 98 °F (36.7 °C)  Heart Rate:  [] 60  Resp:  [18] 18  BP: (101-117)/(57-86) 105/67  Physical Exam   He was awake and alert was not in any form of distress.  Was fairly developed and fairly nourished.    He refused to give information and he refused to be examined.  I was not able to do the medical and neurological examination    Result Review:  I have personally reviewed the results from the time of this admission to 11/6/2024 19:39 EST and agree with these findings:  []  Laboratory  []  Microbiology  []  Radiology  []  EKG/Telemetry   []  Cardiology/Vascular   []  Pathology  []  Old records  []  Other:  Most notable findings include:   November 6, 2024.  I reviewed the digital images of the CAT scan of his brain done on November 6, 2024.  This study showed no acute changes.  The subdural hematoma that was noted on the left side on February 23, 2024 is no longer seen.  This time, she has mild to moderate cortical atrophy which is more marked in the frontal and temporal regions.    November 6, 2024     WBC 5.69 10*3/mm3 MCHC 31.6 g/dL   RBC 2.90 Low  10*6/mm3 RDW 19.2 High  %   Hemoglobin 8.3 Low  g/dL RDW-SD 61.9 High  fl   Hematocrit 26.3 Low  % MPV 10.3 fL   MCV 90.7 fL Platelets 287 10*3/mm3   MCH 28.6 pg            November 6, 2024      Glucose 124 High  mg/dL CO2 17.7 Low  mmol/L   BUN 7 mg/dL Calcium 9.1 mg/dL   Creatinine 1.09 mg/dL BUN/Creatinine Ratio 6.4 Low    Sodium 133 Low  mmol/L Anion Gap 18.3 High  mmol/L   Potassium 3.8 mmol/L eGFR 93.1 mL/min/1.73   Chloride 97 Low  mmol/L         Impression:     Altered mental state  Partial complex seizures  Hyponatremia  Anemia, etiology undetermined  Chronic alcoholism      Plan:   Will obtain an MRI of the brain, EEG and blood work.  Thank you very much for them see this patient.  Follow the patient with you.      Please note that portions of this note were completed with a voice recognition program.  Part of this note is an electric or electronic transcription/translation of spoken language to printed text using the dragon dictating system.    Electronically signed by Nakia Almaraz Jr., MD, 11/06/24, 7:39 PM EST.

## 2024-11-08 ENCOUNTER — APPOINTMENT (OUTPATIENT)
Dept: NEUROLOGY | Facility: HOSPITAL | Age: 29
DRG: 683 | End: 2024-11-08
Payer: COMMERCIAL

## 2024-11-08 LAB
ANA SER QL: NEGATIVE
ANION GAP SERPL CALCULATED.3IONS-SCNC: 10.7 MMOL/L (ref 5–15)
BUN SERPL-MCNC: 8 MG/DL (ref 6–20)
BUN/CREAT SERPL: 9.5 (ref 7–25)
CALCIUM SPEC-SCNC: 8.1 MG/DL (ref 8.6–10.5)
CHLORIDE SERPL-SCNC: 103 MMOL/L (ref 98–107)
CHROMATIN AB SERPL-ACNC: <0.2 AI (ref 0–0.9)
CO2 SERPL-SCNC: 21.3 MMOL/L (ref 22–29)
CREAT SERPL-MCNC: 0.84 MG/DL (ref 0.76–1.27)
DEPRECATED RDW RBC AUTO: 64.4 FL (ref 37–54)
DSDNA AB SER-ACNC: <1 IU/ML (ref 0–9)
EGFRCR SERPLBLD CKD-EPI 2021: 119.6 ML/MIN/1.73
ENA RNP AB SER-ACNC: <0.2 AI (ref 0–0.9)
ENA SM AB SER-ACNC: <0.2 AI (ref 0–0.9)
ENA SS-A AB SER-ACNC: <0.2 AI (ref 0–0.9)
ENA SS-B AB SER-ACNC: <0.2 AI (ref 0–0.9)
ERYTHROCYTE [DISTWIDTH] IN BLOOD BY AUTOMATED COUNT: 19.4 % (ref 12.3–15.4)
GLUCOSE SERPL-MCNC: 107 MG/DL (ref 65–99)
HCT VFR BLD AUTO: 25.5 % (ref 37.5–51)
HGB BLD-MCNC: 7.9 G/DL (ref 13–17.7)
MCH RBC QN AUTO: 28.2 PG (ref 26.6–33)
MCHC RBC AUTO-ENTMCNC: 31 G/DL (ref 31.5–35.7)
MCV RBC AUTO: 91.1 FL (ref 79–97)
PLATELET # BLD AUTO: 403 10*3/MM3 (ref 140–450)
PMV BLD AUTO: 10 FL (ref 6–12)
POTASSIUM SERPL-SCNC: 3.9 MMOL/L (ref 3.5–5.2)
RBC # BLD AUTO: 2.8 10*6/MM3 (ref 4.14–5.8)
RHEUMATOID FACT SERPL-ACNC: <10 IU/ML
RPR SER-TITR: NON REACTIVE TITER
SODIUM SERPL-SCNC: 135 MMOL/L (ref 136–145)
WBC NRBC COR # BLD AUTO: 6.34 10*3/MM3 (ref 3.4–10.8)

## 2024-11-08 PROCEDURE — 85027 COMPLETE CBC AUTOMATED: CPT | Performed by: FAMILY MEDICINE

## 2024-11-08 PROCEDURE — 36415 COLL VENOUS BLD VENIPUNCTURE: CPT | Performed by: FAMILY MEDICINE

## 2024-11-08 PROCEDURE — 25010000002 HEPARIN (PORCINE) PER 1000 UNITS: Performed by: FAMILY MEDICINE

## 2024-11-08 PROCEDURE — 80048 BASIC METABOLIC PNL TOTAL CA: CPT | Performed by: FAMILY MEDICINE

## 2024-11-08 PROCEDURE — 95816 EEG AWAKE AND DROWSY: CPT

## 2024-11-08 PROCEDURE — 99232 SBSQ HOSP IP/OBS MODERATE 35: CPT | Performed by: STUDENT IN AN ORGANIZED HEALTH CARE EDUCATION/TRAINING PROGRAM

## 2024-11-08 RX ORDER — LAMOTRIGINE 25 MG/1
50 TABLET ORAL DAILY
Status: DISCONTINUED | OUTPATIENT
Start: 2024-11-08 | End: 2024-11-09 | Stop reason: HOSPADM

## 2024-11-08 RX ADMIN — HEPARIN SODIUM 5000 UNITS: 5000 INJECTION INTRAVENOUS; SUBCUTANEOUS at 09:49

## 2024-11-08 RX ADMIN — Medication 100 MG: at 09:49

## 2024-11-08 RX ADMIN — FOLIC ACID 1 MG: 1 TABLET ORAL at 09:49

## 2024-11-08 RX ADMIN — HEPARIN SODIUM 5000 UNITS: 5000 INJECTION INTRAVENOUS; SUBCUTANEOUS at 22:28

## 2024-11-08 RX ADMIN — ACETAMINOPHEN 650 MG: 325 TABLET ORAL at 01:40

## 2024-11-08 RX ADMIN — LAMOTRIGINE 50 MG: 25 TABLET ORAL at 23:15

## 2024-11-08 RX ADMIN — NICOTINE 1 PATCH: 14 PATCH, EXTENDED RELEASE TRANSDERMAL at 00:07

## 2024-11-08 RX ADMIN — Medication 1 TABLET: at 09:49

## 2024-11-08 NOTE — PROGRESS NOTES
" [facility]     Psychiatric Progress Note    Patient Name: Nida Peck  : 1995  MRN: [id number]  Primary Care Physician:  Provider, No Known  Date of admission: 2024    Subjective   Subjective     Patient seen and chart reviewed, discussed with staff.    Chief Complaint: Bodyaches, tachycardia, patient states \"I was sick\"      HPI:     Patient came to speech in interview but somewhat reluctantly.  Does not understand why psychiatry was SCM.  States he came in for feeling sick and that he is better now.  States he just wants to go.  States we are trying to make something out of nothing that he has no symptoms.  Denying suicidal or homicidal ideation.  Patient denies any hallucinations.  Patient is perturbed that psychiatry was asked to see him.    Patient reports he is feeling better.  Wants to go home.  He has not had any acute suicidal or homicidal ideations during his stay.  Has not required medicines for acute agitation.  Is denying all psychiatric symptoms.  States he does not need any psychiatric medications.        Objective   Objective     Vitals:   Temp:  [98.1 °F (36.7 °C)-98.8 °F (37.1 °C)] 98.1 °F (36.7 °C)  Heart Rate:  [59-89] 59  Resp:  [16-18] 18  BP: (101-121)/(61-83) 101/62          Mental Status Exam:      Appearance:   Well-developed, nourished, calm, cooperative  Reliability:   Fair  Eye Contact:   Good  Concentration/Focus:    Attentive  Behaviors:    No agitation  Memory :    Intact  Speech:    Normal rate and volume  Language:   Fluent  Mood :    \"Fine\"  Affect:    Irritable  Thought process:    Goal directed, linear, may have some underlying delusions paranoia but difficult to fully ascertain, but no acute agitation and does not appear to be hallucinating  Thought Content:    Denies suicidal or homicidal ideation, no evidence of hallucinations  Insight:   Fair  Judgement:    No acute agitation      Result Review    Result Review:  I have personally reviewed the results from " the time of this admission to 11/8/2024 14:29 EST and agree with these findings:  []  Laboratory  []  Microbiology  []  Radiology  []  EKG/Telemetry   []  Cardiology/Vascular   []  Pathology  []  Old records  []  Other:  Most notable findings include:     Lab Results (last 24 hours)       Procedure Component Value Units Date/Time    Systemic Lupus Profile A [[id number]] Collected: 11/06/24 2105    Specimen: Blood from Arm, Right Updated: 11/08/24 1409     RNP Antibodies <0.2 AI      Damon Antibodies <0.2 AI      RA Latex Turbid <10.0 IU/mL      Antichromatin Antibodies <0.2 AI      Sjogren's Anti-SS-A <0.2 AI      Sjogren's Anti-SS-B <0.2 AI      Anti-DNA (DS) Ab Qn <1 IU/mL      Comment:                                    Negative      <5                                     Equivocal  5 - 9                                     Positive      >9       Narrative:      Performed at:  [address] [place] [address]  [id number]  : Bruce Gonzalez PhD, Phone:  [phone]    Pathology Consultation [[id number]] Collected: 11/07/24 0543    Specimen: Blood from Hand, Right Updated: 11/08/24 1242    JENSEN [[id number]] Collected: 11/06/24 2105    Specimen: Blood from Arm, Right Updated: 11/08/24 1108     JENSEN Direct Negative    Narrative:      Performed at:  [address] [place] [address]  [id number]  : Bruce Gonzalez PhD, Phone:  [phone]    RPR Quant [[id number]] Collected: 11/06/24 2105    Specimen: Blood from Arm, Right Updated: 11/08/24 1108     Rapid Plasma Reagin, Quant Non Reactive titer      Comment: Please Note: This test does not meet current guidelines for  screening and diagnosis of syphilis. This test is intended for  following treatment response in patients being treated for  syphilis infection. To screen for syphilis infection, a reflex  cascade that includes both RPR and a treponema-specific assay  should be utilized, such as Treponema pallidum  (Syphilis)  Screening [place] (577322) or Rapid Plasma Reagin (RPR) Test  With Reflex to Quantitative RPR and Confirmatory Treponema  pallidum Antibodies ([id number]).       Narrative:      Performed at:  [address] [place] [address]  [id number]  : Bruce Gonzalez PhD, Phone:  [phone]    Basic Metabolic Panel [[id number]]  (Abnormal) Collected: 11/08/24 0547    Specimen: Blood from Hand, Right Updated: 11/08/24 0636     Glucose 107 mg/dL      BUN 8 mg/dL      Creatinine 0.84 mg/dL      Sodium 135 mmol/L      Potassium 3.9 mmol/L      Chloride 103 mmol/L      CO2 21.3 mmol/L      Calcium 8.1 mg/dL      BUN/Creatinine Ratio 9.5     Anion Gap 10.7 mmol/L      eGFR 119.6 mL/min/1.73     Narrative:      GFR Normal >60  Chronic Kidney Disease <60  Kidney Failure <15      CBC (No Diff) [[id number]]  (Abnormal) Collected: 11/08/24 0547    Specimen: Blood from Hand, Right Updated: 11/08/24 0601     WBC 6.34 10*3/mm3      RBC 2.80 10*6/mm3      Hemoglobin 7.9 g/dL      Hematocrit 25.5 %      MCV 91.1 fL      MCH 28.2 pg      MCHC 31.0 g/dL      RDW 19.4 %      RDW-SD 64.4 fl      MPV 10.0 fL      Platelets 403 10*3/mm3     Manual Differential [[id number]]  (Abnormal) Collected: 11/07/24 0543    Specimen: Blood from Hand, Right Updated: 11/07/24 1823     Neutrophil % 36.0 %      Lymphocyte % 49.0 %      Monocyte % 10.0 %      Eosinophil % 4.0 %      Bands %  1.0 %      Neutrophils Absolute 2.09 10*3/mm3      Lymphocytes Absolute 2.77 10*3/mm3      Monocytes Absolute 0.57 10*3/mm3      Eosinophils Absolute 0.23 10*3/mm3      Anisocytosis Slight/1+     WBC Morphology Normal     Platelet Estimate Adequate     Large Platelets Slight/1+    Peripheral Blood Smear [[id number]] Collected: 11/07/24 0543    Specimen: Blood from Hand, Right Updated: 11/07/24 1822     Pathology Review Yes    CEA [[id number]] Collected: 11/06/24 2106    Specimen: Blood from Arm, Right Updated: 11/07/24 1430     CEA 5.75 ng/mL      Narrative:      CEA Reference Range:    Non Smokers:   Less than 3 ng/mL  Smokers:       Less than 5 ng/mL  Results may be falsely decreased if patient taking Biotin.    Testing Method: Roche [employer] Electrochemiluminescence Immunoassay(ECLIA)  Values obtained with different assay methods or kits cannot be used interchangeably.                Medications:   folic acid, 1 mg, Oral, Daily  haloperidol lactate, 2 mg, Intramuscular, Once  heparin (porcine), 5,000 Units, Subcutaneous, Q12H  multivitamin with minerals, 1 tablet, Oral, Daily  nicotine, 1 patch, Transdermal, Q24H  thiamine, 100 mg, Oral, Daily          Assessment / Plan       Active Hospital Problems:  Active Hospital Problems    Diagnosis    • **FELI (acute kidney injury)    • Alcoholism    • Alcoholic liver disease    • Anemia    • Hypertension    • Tobacco abuse        Plan:     Discontinue sitter  May discharge per psych  Declining psychiatric follow-up  No criteria for involuntary hospitalization      Disposition: Part of this note may be an electronic transcription/translation of spoken language to printed text using the Dragon dictation system.         Electronically signed by Quinton Faith MD, 11/08/24, 2:29 PM EST.

## 2024-11-08 NOTE — PLAN OF CARE
Goal Outcome Evaluation:  Plan of Care Reviewed With: patient        Progress: improving  Outcome Evaluation: Patient should discharge this evening. MRI and EEG complete, cleared by psych to go back to [facility], who have agreed to receive him. Awaiting discharge orders. No c/o pain, vitals WNL, patient in ok spirits.

## 2024-11-08 NOTE — CONSULTS
"Nutrition Services    Patient Name: Nida Peck  YOB: 1995  MRN: [id number]  Admission date: 11/1/2024      CLINICAL NUTRITION ASSESSMENT      Reason for Assessment  Follow Up   H&P:  Past Medical History:   Diagnosis Date    Alcoholism         Current Problems:   Active Hospital Problems    Diagnosis     **FELI (acute kidney injury)     Alcoholism     Alcoholic liver disease     Anemia     Hypertension     Tobacco abuse         Nutrition/Diet History         Narrative   Upon my visit, patient cleaning the glue from his hair left over from MRI this AM. Safety sitter in room. When asked about appetite, patient responds with \"it's fine\" and does not seem interested in nutrition interview. Denies any weight loss or change in eating. No further nutrition interventions at this time.      Anthropometrics        Current Height, Weight Height: 185.4 cm (73\")  Weight: 63.9 kg (140 lb 14 oz)   Current BMI Body mass index is 18.59 kg/m².   BMI Classification Normal range   % IBW 80%   Adjusted Body Weight (ABW) N/A   Weight Hx  Wt Readings from Last 30 Encounters:   11/01/24 1718 63.9 kg (140 lb 14 oz)   02/25/24 0400 67.1 kg (147 lb 14.9 oz)   02/23/24 0311 65.7 kg (144 lb 13.5 oz)   02/22/24 1954 74.8 kg (165 lb)          Wt Change Observation -4.8% x 9 months     Estimated/Assessed Needs  Estimated Needs based on: Ideal Body Weight       Energy Requirements 25-30 kcal/kg   EST Needs (kcal/day) 6041-7425 kcal       Protein Requirements 1.0 g/kg   EST Daily Needs (g/day) 80 g       Fluid Requirements 1 ml/kcal    Estimated Needs (mL/day) 3612-7846 mL     Labs/Medications         Pertinent Labs Reviewed.   Results from last 7 days   Lab Units 11/08/24  0547 11/07/24  0543 11/06/24  0644 11/04/24  1954 11/04/24  0625 11/02/24  0523 11/01/24  1819   SODIUM mmol/L 135* 135* 133*   < > 135*   < > 125*   POTASSIUM mmol/L 3.9 4.1 3.8   < > 3.2*   < > 3.5   CHLORIDE mmol/L 103 105 97*   < > 99   < > 76*   CO2 mmol/L " 21.3* 18.8* 17.7*   < > 22.2   < > 19.9*   BUN mg/dL 8 9 7   < > 8   < > 23*   CREATININE mg/dL 0.84 1.02 1.09   < > 0.98   < > 2.19*   CALCIUM mg/dL 8.1* 7.7* 9.1   < > 8.7   < > 10.1   BILIRUBIN mg/dL  --   --   --   --  0.5  --  1.4*   ALK PHOS U/L  --   --   --   --  162*  --  223*   ALT (SGPT) U/L  --   --   --   --  57*  --  65*   AST (SGOT) U/L  --   --   --   --  147*  --  263*   GLUCOSE mg/dL 107* 150* 124*   < > 104*   < > 91    < > = values in this interval not displayed.     Results from last 7 days   Lab Units 11/08/24  0547   HEMOGLOBIN g/dL 7.9*   HEMATOCRIT % 25.5*     COVID19   Date Value Ref Range Status   11/01/2024 Not Detected Not Detected - Ref. Range Final     Lab Results   Component Value Date    HGBA1C 5.50 02/23/2024         Pertinent Medications Reviewed.     Malnutrition Severity Assessment              Nutrition Diagnosis         Nutrition Dx Problem 1 Inadequate energy Intake r/t inadequate oral intake AEB 80% IBW.      Nutrition Intervention           Current Nutrition Orders & Evaluation of Intake       Current PO Diet Diet: Regular/House; Fluid Consistency: Thin (IDDSI 0)   Supplement Orders Placed This Encounter      Dietary Nutrition Supplements Magic Cup           Nutrition Intervention/Prescription        Magic Cup BID (+290 kcal, 9 g pro each)        Medical Nutrition Therapy/Nutrition Education          Learner     Readiness Patient  Education not appropriate at this time     Method     Response N/A  N/A     Monitor/Evaluation        Monitor Per protocol, PO intake, Supplement intake, Pertinent labs, Weight, POC/GOC     Nutrition Discharge Plan         To be determined     Electronically signed by:  Elaina Guadarrama RD  11/08/24 10:37 EST

## 2024-11-09 ENCOUNTER — READMISSION MANAGEMENT (OUTPATIENT)
Dept: CALL CENTER | Facility: HOSPITAL | Age: 29
End: 2024-11-09
Payer: COMMERCIAL

## 2024-11-09 VITALS
HEART RATE: 53 BPM | BODY MASS INDEX: 18.67 KG/M2 | HEIGHT: 73 IN | SYSTOLIC BLOOD PRESSURE: 111 MMHG | OXYGEN SATURATION: 100 % | DIASTOLIC BLOOD PRESSURE: 71 MMHG | RESPIRATION RATE: 18 BRPM | WEIGHT: 140.87 LBS | TEMPERATURE: 97.5 F

## 2024-11-09 LAB
ANION GAP SERPL CALCULATED.3IONS-SCNC: 10.8 MMOL/L (ref 5–15)
BUN SERPL-MCNC: 7 MG/DL (ref 6–20)
BUN/CREAT SERPL: 7.8 (ref 7–25)
CALCIUM SPEC-SCNC: 8.1 MG/DL (ref 8.6–10.5)
CHLORIDE SERPL-SCNC: 103 MMOL/L (ref 98–107)
CO2 SERPL-SCNC: 20.2 MMOL/L (ref 22–29)
CREAT SERPL-MCNC: 0.9 MG/DL (ref 0.76–1.27)
DEPRECATED RDW RBC AUTO: 65 FL (ref 37–54)
EGFRCR SERPLBLD CKD-EPI 2021: 117.1 ML/MIN/1.73
ERYTHROCYTE [DISTWIDTH] IN BLOOD BY AUTOMATED COUNT: 19.4 % (ref 12.3–15.4)
GLUCOSE SERPL-MCNC: 125 MG/DL (ref 65–99)
HCT VFR BLD AUTO: 24.7 % (ref 37.5–51)
HGB BLD-MCNC: 7.6 G/DL (ref 13–17.7)
MCH RBC QN AUTO: 28.6 PG (ref 26.6–33)
MCHC RBC AUTO-ENTMCNC: 30.8 G/DL (ref 31.5–35.7)
MCV RBC AUTO: 92.9 FL (ref 79–97)
PLATELET # BLD AUTO: 442 10*3/MM3 (ref 140–450)
PMV BLD AUTO: 10 FL (ref 6–12)
POTASSIUM SERPL-SCNC: 4 MMOL/L (ref 3.5–5.2)
RBC # BLD AUTO: 2.66 10*6/MM3 (ref 4.14–5.8)
SODIUM SERPL-SCNC: 134 MMOL/L (ref 136–145)
WBC NRBC COR # BLD AUTO: 6.68 10*3/MM3 (ref 3.4–10.8)

## 2024-11-09 PROCEDURE — 36415 COLL VENOUS BLD VENIPUNCTURE: CPT | Performed by: FAMILY MEDICINE

## 2024-11-09 PROCEDURE — 80048 BASIC METABOLIC PNL TOTAL CA: CPT | Performed by: FAMILY MEDICINE

## 2024-11-09 PROCEDURE — 99238 HOSP IP/OBS DSCHRG MGMT 30/<: CPT | Performed by: STUDENT IN AN ORGANIZED HEALTH CARE EDUCATION/TRAINING PROGRAM

## 2024-11-09 PROCEDURE — 85027 COMPLETE CBC AUTOMATED: CPT | Performed by: FAMILY MEDICINE

## 2024-11-09 RX ORDER — LANOLIN ALCOHOL/MO/W.PET/CERES
100 CREAM (GRAM) TOPICAL DAILY
Qty: 30 TABLET | Refills: 0 | Status: SHIPPED | OUTPATIENT
Start: 2024-11-09 | End: 2024-11-09 | Stop reason: HOSPADM

## 2024-11-09 RX ORDER — FOLIC ACID 1 MG/1
1 TABLET ORAL DAILY
Qty: 30 TABLET | Refills: 0 | Status: SHIPPED | OUTPATIENT
Start: 2024-11-09

## 2024-11-09 RX ORDER — FOLIC ACID 1 MG/1
1 TABLET ORAL DAILY
Qty: 30 TABLET | Refills: 0 | Status: SHIPPED | OUTPATIENT
Start: 2024-11-09 | End: 2024-11-09 | Stop reason: HOSPADM

## 2024-11-09 RX ORDER — LEVETIRACETAM 500 MG/1
500 TABLET ORAL EVERY 12 HOURS SCHEDULED
Qty: 60 TABLET | Refills: 0 | Status: SHIPPED | OUTPATIENT
Start: 2024-11-09 | End: 2024-11-09 | Stop reason: HOSPADM

## 2024-11-09 RX ORDER — LEVETIRACETAM 500 MG/1
500 TABLET ORAL 2 TIMES DAILY
Qty: 30 TABLET | Refills: 0 | Status: SHIPPED | OUTPATIENT
Start: 2024-11-09

## 2024-11-09 RX ORDER — LANOLIN ALCOHOL/MO/W.PET/CERES
100 CREAM (GRAM) TOPICAL DAILY
Qty: 30 TABLET | Refills: 0 | Status: SHIPPED | OUTPATIENT
Start: 2024-11-09

## 2024-11-09 RX ADMIN — LAMOTRIGINE 50 MG: 25 TABLET ORAL at 09:06

## 2024-11-09 RX ADMIN — FOLIC ACID 1 MG: 1 TABLET ORAL at 09:07

## 2024-11-09 RX ADMIN — Medication 1 TABLET: at 09:07

## 2024-11-09 RX ADMIN — NICOTINE 1 PATCH: 14 PATCH, EXTENDED RELEASE TRANSDERMAL at 00:20

## 2024-11-09 RX ADMIN — Medication 100 MG: at 09:07

## 2024-11-09 NOTE — DISCHARGE SUMMARY
[facility]         HOSPITALIST  DISCHARGE SUMMARY    Patient Name: Nida Peck  : 1995  MRN: [id number]    Date of Admission: 2024  Date of Discharge: 2024  Primary Care Physician: Provider, No Known    Consults       Date and Time Order Name Status Description    2024  4:48 PM Inpatient Neurology Consult General      2024  2:42 PM Inpatient Psychiatrist Consult Completed     2024  9:21 PM Inpatient Hospitalist Consult              Active and Resolved Hospital Problems:  Active Hospital Problems    Diagnosis POA   • **FELI (acute kidney injury) [N17.9] Yes   • Alcoholism [F10.20] Unknown   • Alcoholic liver disease [K70.9] Yes   • Anemia [D64.9] Yes   • Hypertension [I10] Yes   • Tobacco abuse [Z72.0] Yes      Resolved Hospital Problems   No resolved problems to display.       Hospital Course     Hospital Course:  Nida Peck is a 29 y.o. male with a past medical history of alcohol dependence admitted for palpitations and bodyaches in the setting of alcohol withdrawal.  Last known alcoholic beverage was approximately 2 days prior to arrival in the ER.  When he arrived, he was found to be tachycardic with laboratory evaluation notable for acute kidney injury, abnormal liver function testing, positive trichomonas on urinalysis.  After supportive care, intravenous fluids and trichomonas treatment, as well as monitoring with CIWA protocol and Ativan, the patient improved, however was noted to have hallucinations and agitation, therefore, psychiatry and neurology consultants requested for further evaluation and management.     Given his impaired insight and judgment in the setting of his confusion agitation and psychosis, patient was placed on involuntary psychiatric hold by psychiatrist pending medical clearance for transfer to inpatient psychiatry.  He underwent evaluation, and per neurologist discussion with patient's family and associates he was noted to have had some  hallucinations prior to his admission as far back as June 2024 earlier and had previously been seen by psychiatry as per psychiatry note.  Given the chronicity of his symptoms and outside follow-up, once his alcohol withdrawal resolved and patient regained capacity for decision making he was cleared for discharge by psychiatrist and will be discussed with discharge at this time for close outpatient follow-up.  During his inpatient stay he underwent evaluation for his psychosis including MRI brain which was without acute findings, CT head, EEG, and encephalopathy panel which is still pending and patient will need to follow-up results of.  This has been explained to him.        DISCHARGE Follow Up Recommendations for labs and diagnostics: Follow-up the results of your inpatient initiated neurology workup including Aspergillus antibodies, Babesia micro biome and early PCR, Bartonella, heavy metals, Lyme disease PCR, spotted fever antibodies and West Nile antibodies.      Day of Discharge     Vital Signs:  Temp:  [97.4 °F (36.3 °C)-98 °F (36.7 °C)] 98 °F (36.7 °C)  Heart Rate:  [58-96] 58  Resp:  [18] 18  BP: (100-133)/(60-79) 130/79  Physical Exam: General: Awake, alert, in no acute distress  HENT: Atraumatic, normocephalic.  Eyes: pupils equal, round, without scleral icterus  Cardiovascular: Regular rate and rhythm, no murmurs   Pulmonary: CTA bilaterally; no wheezes; no conversational dyspnea  Gastrointestinal: Soft nontender nondistended  Musculoskeletal: No gross deformities  Skin: No jaundice, no rash on exposed skin appreciated  Neuro: speech clear; no tremor  Psych: Mood and affect appropriate  : No suprapubic tenderness      Discharge Details        Discharge Medications        New Medications        Instructions Start Date   LORazepam 1 MG tablet  Commonly known as: ATIVAN   1 mg, Oral, Every 1 Hour PRN      LORazepam 2 MG tablet  Commonly known as: ATIVAN   2 mg, Oral, Every 1 Hour PRN             Continue  These Medications        Instructions Start Date   acetaminophen 325 MG tablet  Commonly known as: TYLENOL   Take 2 tablets by mouth Every 4 (Four) Hours As Needed for Mild Pain or Fever - Temp greater than or equal to 37 C      folic acid 1 MG tablet  Commonly known as: FOLVITE   1 mg, Oral, Daily      levETIRAcetam 500 MG tablet  Commonly known as: KEPPRA   500 mg, Oral, Every 12 Hours Scheduled      nicotine 21 MG/24HR patch  Commonly known as: NICODERM CQ   1 patch, Transdermal, Every 24 Hours Scheduled      One-Daily Multi-Vitamin tablet tablet  Generic drug: multivitamin   1 tablet, Oral, Daily      thiamine 100 MG tablet  Commonly known as: VITAMIN B1   100 mg, Oral, Daily             Stop These Medications      metoprolol tartrate 25 MG tablet  Commonly known as: LOPRESSOR              Allergies   Allergen Reactions   • Penicillins Unknown - Low Severity   • Sulfa Antibiotics Unknown - Low Severity       Discharge Disposition:  Home or Self Care    Diet:  Hospital:  Diet Order   Procedures   • Diet: Regular/House; Fluid Consistency: Thin (IDDSI 0)       Discharge Activity:       CODE STATUS:  Code Status and Medical Interventions: CPR (Attempt to Resuscitate); Full Support   Ordered at: 11/06/24 1647     Code Status (Patient has no pulse and is not breathing):    CPR (Attempt to Resuscitate)     Medical Interventions (Patient has pulse or is breathing):    Full Support         No future appointments.        Pertinent  and/or Most Recent Results     PROCEDURES:   None    LAB RESULTS:      Lab 11/09/24  0609 11/08/24  0547 11/07/24  0543 11/06/24  2106 11/06/24  0644 11/05/24  0616 11/04/24  0625   WBC 6.68 6.34 5.66  --  5.69 6.77 5.05   HEMOGLOBIN 7.6* 7.9* 7.2*  --  8.3* 9.1* 8.0*   HEMATOCRIT 24.7* 25.5* 23.3*  --  26.3* 29.0* 24.4*   PLATELETS 442 403 300  --  287 191 117*   NEUTROS ABS  --   --  2.09  --   --   --  2.41   IMMATURE GRANS (ABS)  --   --   --   --   --   --  0.02   LYMPHS ABS  --   --   --    --   --   --  1.81   MONOS ABS  --   --   --   --   --   --  0.53   EOS ABS  --   --  0.23  --   --   --  0.24   MCV 92.9 91.1 91.0  --  90.7 91.2 88.4   CRP  --   --   --  0.47  --   --   --    PROCALCITONIN  --   --   --   --  0.38*  --   --    D DIMER QUANT  --   --   --  <0.27  --   --   --          Lab 11/09/24  0609 11/08/24  0547 11/07/24  0543 11/06/24  2106 11/06/24  0644 11/05/24  1546 11/05/24  0616   SODIUM 134* 135* 135*  --  133*  --  134*   POTASSIUM 4.0 3.9 4.1  --  3.8 4.1 3.5   CHLORIDE 103 103 105  --  97*  --  96*   CO2 20.2* 21.3* 18.8*  --  17.7*  --  21.1*   ANION GAP 10.8 10.7 11.2  --  18.3*  --  16.9*   BUN 7 8 9  --  7  --  6   CREATININE 0.90 0.84 1.02  --  1.09  --  1.00   EGFR 117.1 119.6 100.8  --  93.1  --  103.2   GLUCOSE 125* 107* 150*  --  124*  --  110*   CALCIUM 8.1* 8.1* 7.7*  --  9.1  --  9.0   TSH  --   --   --  1.060  --   --   --          Lab 11/04/24  0625   TOTAL PROTEIN 6.9   ALBUMIN 3.9   GLOBULIN 3.0   ALT (SGPT) 57*   AST (SGOT) 147*   BILIRUBIN 0.5   ALK PHOS 162*                 Lab 11/06/24  2106   VITAMIN B 12 520         Brief Urine Lab Results  (Last result in the past 365 days)        Color   Clarity   Blood   Leuk Est   Nitrite   Protein   CREAT   Urine HCG        11/01/24 2344 Dark Yellow   Turbid   Moderate (2+)   Large (3+)   Negative   >=300 mg/dL (3+)                 Microbiology Results (last 10 days)       Procedure Component Value - Date/Time    Legionella Antigen, Urine - Urine, Urine, Clean Catch [[id number]]  (Normal) Collected: 11/06/24 2127    Lab Status: Final result Specimen: Urine, Clean Catch Updated: 11/06/24 2152     LEGIONELLA ANTIGEN, URINE Negative    COVID PRE-OP / PRE-PROCEDURE SCREENING ORDER (NO ISOLATION) - Swab, Nasopharynx [[id number]]  (Normal) Collected: 11/01/24 2207    Lab Status: Final result Specimen: Swab from Nasopharynx Updated: 11/01/24 2253    Narrative:      The following orders were created for panel order COVID PRE-OP /  PRE-PROCEDURE SCREENING ORDER (NO ISOLATION) - Swab, Nasopharynx.  Procedure                               Abnormality         Status                     ---------                               -----------         ------                     COVID-19, FLU A/B, RSV P...[[id number]]  Normal              Final result                 Please view results for these tests on the individual orders.    COVID-19, FLU A/B, RSV PCR 1 HR TAT - Swab, Nasopharynx [[id number]]  (Normal) Collected: 11/01/24 2207    Lab Status: Final result Specimen: Swab from Nasopharynx Updated: 11/01/24 2253     COVID19 Not Detected     Influenza A PCR Not Detected     Influenza B PCR Not Detected     RSV, PCR Not Detected    Narrative:      Fact sheet for providers: https://www.fda.gov/media/839128/download    Fact sheet for patients: https://www.fda.gov/media/196074/download    Test performed by PCR.            MRI Brain Without Contrast    Result Date: 11/7/2024  Impression: Unremarkable MRI brain Electronically Signed: Wali Dowling MD  11/7/2024 9:31 PM EST  Workstation ID: [id number]    CT Head Without Contrast    Result Date: 11/6/2024  Impression: No acute intracranial process evident. The left subdural hematoma which was visualized on CT performed on February 25, 2024 has resolved. Electronically Signed: Ronny Olivas MD  11/6/2024 3:28 PM EST  Workstation ID: [id number]      Results for orders placed during the hospital encounter of 02/22/24    Duplex Portal Hepatic Complete CAR    Interpretation Summary  •  All vessels appear normal with normal flow direction and no evidence of thrombus.      Results for orders placed during the hospital encounter of 02/22/24    Duplex Portal Hepatic Complete CAR    Interpretation Summary  •  All vessels appear normal with normal flow direction and no evidence of thrombus.          Labs Pending at Discharge:  Pending Labs       Order Current Status    Aspergillus Antibodies In process    Babeisa  microtic+Ehrlichia/PCR In process    Bartonella Antibody Panel In process    Heavy Metals, Blood In process    Lyme Disease, PCR - Blood, Arm, Left In process    Pathology Consultation In process    SARS-CoV-2 Antibodies, Nucleocapsid (Natural Immunity) In process    SARS-CoV-2 Semi-Quant Total Ab In process    Spotted Fever Group AB, IgG/IgM In process    West Nile Antibodies, IgG & IgM In process              Time spent on Discharge including face to face service: Less than 30 minutes    Electronically signed by Navi Sainz MD, 11/09/24, 9:02 AM EST.

## 2024-11-09 NOTE — PLAN OF CARE
Goal Outcome Evaluation:  Plan of Care Reviewed With: patient        Progress: improving  Outcome Evaluation: Patient is being discharged back to [facility]. No c/o pain, vitals WNL. Home meds will be sent with him.

## 2024-11-09 NOTE — PROGRESS NOTES
" [facility]   Hospitalist Progress Note  Date: 2024  Patient Name: Nida Peck  : 1995  MRN: [id number]  Date of admission: 2024  Room/Bed: 3022/      Subjective   Subjective     Chief Complaint:   Chief Complaint   Patient presents with   • Palpitations     Pt arrived ambulatory to triage with c/o of palpitations and SOA \"for a while\". Pt is at the [facility] [place] alcohol addiction. Pt reports his last drink was 2 days ago. Pt reports he used to drink a 1/5 of vodka a day. Pt also reports n/v, blurry vision, abdominal pain, and anxiety. Pt reports feeling this way when he detoxed from alcohol previously.    • Abdominal Pain       Summary: 29-year-old male with past medical history of alcohol dependence admitted for palpitations and bodyaches in the setting of alcohol detox.  Last known alcoholic beverage was approximately 2 days prior to arrival in the ER.  When he arrived, he was found to be tachycardic with laboratory evaluation notable for acute kidney injury, abnormal liver function testing, positive trichomonas on urinalysis.  After supportive care, intravenous fluids and monitoring with CIWA protocol and Ativan patient improved, however was noted to have hallucinations and agitation, therefore, psychiatry and neurology consultants requested for further evaluation and management.    Given impaired insight and judgment in the setting of his confusion agitation and psychosis, patient was placed on involuntary psychiatric hold by psychiatrist pending medical clearance for transfer to inpatient psychiatry.  He is currently undergoing evaluation.    Interval Followup: Per neurologist note he has been having hallucinatory events prior to admission including as far back as 2024 or earlier.  Patient continues to deny any psychosis.  His MRI has been negative for acute abnormality.  He EEG has been performed but is pending read.  He has been cleared for discharge and released from " involuntary psychiatric hold.    Patient feels well, is happy to be able to go but requests to stay the night that he can arrange transportation tomorrow.    Objective   Objective     Vitals:   Temp:  [97.4 °F (36.3 °C)-98.8 °F (37.1 °C)] 97.4 °F (36.3 °C)  Heart Rate:  [59-89] 62  Resp:  [16-18] 18  BP: (100-121)/(60-83) 100/60    Physical Exam   General: Awake, alert man in no acute distress.  Resting calmly.    HENT: Atraumatic, normocephalic.   Eyes: pupils equal, round, without scleral icterus  Cardiovascular: Regular rate and rhythm, no murmurs   Pulmonary: CTA bilaterally; no wheezes; no conversational dyspnea  Gastrointestinal: Soft nontender nondistended  Musculoskeletal: No gross deformities, or ankle edema  Skin: No jaundice, no rash on exposed skin appreciated  Neuro: speech clear; no tremor  Psych: Mood and affect not appropriate for exam  : No suprapubic tenderness    Result Review    Result Review:  I have personally reviewed these results:  [x]  Laboratory      Lab 11/08/24  0547 11/07/24  0543 11/06/24  2106 11/06/24  0644 11/05/24  0616 11/04/24  0625   WBC 6.34 5.66  --  5.69   < > 5.05   HEMOGLOBIN 7.9* 7.2*  --  8.3*   < > 8.0*   HEMATOCRIT 25.5* 23.3*  --  26.3*   < > 24.4*   PLATELETS 403 300  --  287   < > 117*   NEUTROS ABS  --  2.09  --   --   --  2.41   IMMATURE GRANS (ABS)  --   --   --   --   --  0.02   LYMPHS ABS  --   --   --   --   --  1.81   MONOS ABS  --   --   --   --   --  0.53   EOS ABS  --  0.23  --   --   --  0.24   MCV 91.1 91.0  --  90.7   < > 88.4   CRP  --   --  0.47  --   --   --    PROCALCITONIN  --   --   --  0.38*  --   --    D DIMER QUANT  --   --  <0.27  --   --   --     < > = values in this interval not displayed.         Lab 11/08/24  0547 11/07/24  0543 11/06/24  2106 11/06/24  0644   SODIUM 135* 135*  --  133*   POTASSIUM 3.9 4.1  --  3.8   CHLORIDE 103 105  --  97*   CO2 21.3* 18.8*  --  17.7*   ANION GAP 10.7 11.2  --  18.3*   BUN 8 9  --  7   CREATININE 0.84  1.02  --  1.09   EGFR 119.6 100.8  --  93.1   GLUCOSE 107* 150*  --  124*   CALCIUM 8.1* 7.7*  --  9.1   TSH  --   --  1.060  --          Lab 11/04/24  0625   TOTAL PROTEIN 6.9   ALBUMIN 3.9   GLOBULIN 3.0   ALT (SGPT) 57*   AST (SGOT) 147*   BILIRUBIN 0.5   ALK PHOS 162*                   Lab 11/06/24  2106   VITAMIN B 12 520         Brief Urine Lab Results  (Last result in the past 365 days)        Color   Clarity   Blood   Leuk Est   Nitrite   Protein   CREAT   Urine HCG        11/01/24 2344 Dark Yellow   Turbid   Moderate (2+)   Large (3+)   Negative   >=300 mg/dL (3+)                 [x]  Microbiology   Microbiology Results (last 10 days)       Procedure Component Value - Date/Time    Legionella Antigen, Urine - Urine, Urine, Clean Catch [[id number]]  (Normal) Collected: 11/06/24 2127    Lab Status: Final result Specimen: Urine, Clean Catch Updated: 11/06/24 2152     LEGIONELLA ANTIGEN, URINE Negative    COVID PRE-OP / PRE-PROCEDURE SCREENING ORDER (NO ISOLATION) - Swab, Nasopharynx [[id number]]  (Normal) Collected: 11/01/24 2207    Lab Status: Final result Specimen: Swab from Nasopharynx Updated: 11/01/24 2253    Narrative:      The following orders were created for panel order COVID PRE-OP / PRE-PROCEDURE SCREENING ORDER (NO ISOLATION) - Swab, Nasopharynx.  Procedure                               Abnormality         Status                     ---------                               -----------         ------                     COVID-19, FLU A/B, RSV P...[[id number]]  Normal              Final result                 Please view results for these tests on the individual orders.    COVID-19, FLU A/B, RSV PCR 1 HR TAT - Swab, Nasopharynx [[id number]]  (Normal) Collected: 11/01/24 2207    Lab Status: Final result Specimen: Swab from Nasopharynx Updated: 11/01/24 2253     COVID19 Not Detected     Influenza A PCR Not Detected     Influenza B PCR Not Detected     RSV, PCR Not Detected    Narrative:      Fact sheet for  providers: https://www.fda.gov/media/578699/download    Fact sheet for patients: https://www.fda.gov/media/126907/download    Test performed by PCR.          [x]  Radiology  MRI Brain Without Contrast    Result Date: 11/7/2024  Impression: Unremarkable MRI brain Electronically Signed: Wali Dowling MD  11/7/2024 9:31 PM EST  Workstation ID: [id number]    CT Head Without Contrast    Result Date: 11/6/2024  Impression: No acute intracranial process evident. The left subdural hematoma which was visualized on CT performed on February 25, 2024 has resolved. Electronically Signed: Ronny Olivas MD  11/6/2024 3:28 PM EST  Workstation ID: [id number]   []  EKG/Telemetry   []  Cardiology/Vascular   []  Pathology  []  Old records  []  Other:    Assessment & Plan   Assessment / Plan     Assessment:  Chronic psychosis/metabolic encephalopathy: CT head with no acute intracranial process.  MRI brain negative for acute abnormalities, EEG pending results.  Per chart review, neurology and psychiatry patient has had similar symptoms for some time and does follow with psychiatrist outpatient.  He can follow-up results of lab testing still pending here outpatient and will likely be discharged tomorrow.  Involuntary psychiatric hold has been discontinued by psychiatry.    Normocytic anemia: Present since admission.  In setting of chronic alcohol use this is most likely.  Follow-up TSH, heavy metal screen, also add peripheral smear.  Transfuse for less than 7 or active bleed    Acute kidney injury: Resolved    Alcoholism: Recommend alcohol cessation    Alcohol withdrawal: Resolved    Abnormal liver function testing: Likely from alcohol.  Recheck after discharge by PCP.         Discussed with RN.    VTE Prophylaxis:  Pharmacologic VTE prophylaxis orders are present.        CODE STATUS:   Code Status (Patient has no pulse and is not breathing): CPR (Attempt to Resuscitate)  Medical Interventions (Patient has pulse or is breathing): Full  Support      Electronically signed by Navi Sainz MD, 11/8/2024, 19:16 EST.

## 2024-11-10 NOTE — OUTREACH NOTE
Prep Survey      Flowsheet Row Responses   [facility] facility patient discharged from? [facility]   Is LACE score < 7 ? No   Eligibility Readm Mgmt   Discharge diagnosis FELI (acute kidney injury)   Does the patient have one of the following disease processes/diagnoses(primary or secondary)? Other   Does the patient have Home health ordered? No   Is there a DME ordered? No   General alerts for this patient return to [facility] house for addiction tx   Prep survey completed? Yes            Karen BAY - Registered Nurse

## 2024-11-11 LAB
CYTO UR: NORMAL
LAB AP CASE REPORT: NORMAL
LAB AP CLINICAL INFORMATION: NORMAL
PATH REPORT.FINAL DX SPEC: NORMAL
PATH REPORT.GROSS SPEC: NORMAL
SARS-COV-2 AB SERPL IA-ACNC: 161 U/ML
SARS-COV-2 AB SERPL QL IA: POSITIVE
SARS-COV-2 AB SERPL-IMP: POSITIVE
WNV IGG SER QL IA: NEGATIVE
WNV IGM SER QL IA: NEGATIVE

## 2024-11-12 LAB
A FLAVUS AB SER QL ID: NEGATIVE
A FUMIGATUS AB SER QL ID: NEGATIVE
A NIGER AB SER QL ID: NEGATIVE
A PHAGOCYTOPH DNA BLD QL NAA+PROBE: NEGATIVE
B BURGDOR DNA SPEC QL NAA+PROBE: NEGATIVE
B HENSELAE IGG TITR SER IF: NEGATIVE TITER
B HENSELAE IGM TITR SER IF: NEGATIVE TITER
B QUINTANA IGG TITR SER IF: NEGATIVE TITER
B QUINTANA IGM TITR SER IF: NEGATIVE TITER
BABESIA SPEC: NEGATIVE
EHRLICHIA DNA SPEC QL NAA+PROBE: NEGATIVE

## 2024-11-13 LAB
ARSENIC BLD-MCNC: 2 UG/L (ref 0–9)
LEAD BLDV-MCNC: <1 UG/DL (ref 0–3.4)
MERCURY BLD-MCNC: <1 UG/L (ref 0–14.9)
RESULT COMMENT:: NORMAL
RICK SF IGG TITR SER: NORMAL {TITER}
RICK SF IGM TITR SER: NORMAL {TITER}

## 2024-11-20 ENCOUNTER — READMISSION MANAGEMENT (OUTPATIENT)
Dept: CALL CENTER | Facility: HOSPITAL | Age: 29
End: 2024-11-20
Payer: COMMERCIAL

## 2024-11-20 NOTE — OUTREACH NOTE
Medical Week 2 Survey      Flowsheet Row Responses   [facility] facility patient discharged from? [facility]   Does the patient have one of the following disease processes/diagnoses(primary or secondary)? Other   Week 2 attempt successful? No   Unsuccessful attempts Attempt 1            Safia Hsieh Registered Nurse

## 2024-11-27 ENCOUNTER — READMISSION MANAGEMENT (OUTPATIENT)
Dept: CALL CENTER | Facility: HOSPITAL | Age: 29
End: 2024-11-27
Payer: COMMERCIAL

## 2024-11-28 NOTE — OUTREACH NOTE
Medical Week 3 Survey      Flowsheet Row Responses   [facility] patient discharged from? [facility]   Does the patient have one of the following disease processes/diagnoses(primary or secondary)? Other   Week 3 attempt successful? No   Unsuccessful attempts Attempt 1            Ronna HULL - Licensed Nurse

## 2024-12-03 ENCOUNTER — READMISSION MANAGEMENT (OUTPATIENT)
Dept: CALL CENTER | Facility: HOSPITAL | Age: 29
End: 2024-12-03
Payer: COMMERCIAL

## 2024-12-03 NOTE — OUTREACH NOTE
Medical Week 3 Survey      Flowsheet Row Responses   [facility] facility patient discharged from? [facility]   Does the patient have one of the following disease processes/diagnoses(primary or secondary)? Other   Week 3 attempt successful? No   Unsuccessful attempts Attempt 2            Safia BAY - Registered Nurse

## 2025-08-21 ENCOUNTER — APPOINTMENT (OUTPATIENT)
Dept: GENERAL RADIOLOGY | Facility: HOSPITAL | Age: 30
End: 2025-08-21
Payer: COMMERCIAL

## 2025-08-21 ENCOUNTER — HOSPITAL ENCOUNTER (EMERGENCY)
Facility: HOSPITAL | Age: 30
Discharge: LEFT BEFORE DISCHARGE | End: 2025-08-21
Attending: EMERGENCY MEDICINE
Payer: COMMERCIAL

## 2025-08-22 ENCOUNTER — APPOINTMENT (OUTPATIENT)
Dept: GENERAL RADIOLOGY | Facility: HOSPITAL | Age: 30
End: 2025-08-22
Payer: COMMERCIAL

## 2025-08-22 ENCOUNTER — HOSPITAL ENCOUNTER (EMERGENCY)
Facility: HOSPITAL | Age: 30
Discharge: HOME | End: 2025-08-22
Attending: PHARMACIST
Payer: COMMERCIAL